# Patient Record
Sex: MALE | Race: WHITE | NOT HISPANIC OR LATINO
[De-identification: names, ages, dates, MRNs, and addresses within clinical notes are randomized per-mention and may not be internally consistent; named-entity substitution may affect disease eponyms.]

---

## 2017-06-25 PROBLEM — Z00.00 ENCOUNTER FOR PREVENTIVE HEALTH EXAMINATION: Status: ACTIVE | Noted: 2017-06-25

## 2017-07-27 ENCOUNTER — APPOINTMENT (OUTPATIENT)
Dept: UROLOGY | Facility: CLINIC | Age: 82
End: 2017-07-27
Payer: MEDICARE

## 2017-07-27 DIAGNOSIS — Z80.0 FAMILY HISTORY OF MALIGNANT NEOPLASM OF DIGESTIVE ORGANS: ICD-10-CM

## 2017-07-27 DIAGNOSIS — H40.9 UNSPECIFIED GLAUCOMA: ICD-10-CM

## 2017-07-27 DIAGNOSIS — Z80.42 FAMILY HISTORY OF MALIGNANT NEOPLASM OF PROSTATE: ICD-10-CM

## 2017-07-27 LAB
BILIRUB UR QL STRIP: NORMAL
CLARITY UR: CLEAR
COLLECTION METHOD: NORMAL
GLUCOSE UR-MCNC: NORMAL
HCG UR QL: NORMAL EU/DL
HGB UR QL STRIP.AUTO: NORMAL
KETONES UR-MCNC: NORMAL
LEUKOCYTE ESTERASE UR QL STRIP: 25
NITRITE UR QL STRIP: NORMAL
PH UR STRIP: 5
PROT UR STRIP-MCNC: NORMAL
SP GR UR STRIP: 1.01

## 2017-07-27 PROCEDURE — 81003 URINALYSIS AUTO W/O SCOPE: CPT | Mod: QW

## 2017-07-27 PROCEDURE — 99213 OFFICE O/P EST LOW 20 MIN: CPT

## 2017-07-27 RX ORDER — TIMOLOL MALEATE 5 MG/ML
0.5 SOLUTION OPHTHALMIC
Refills: 0 | Status: ACTIVE | COMMUNITY

## 2018-01-24 ENCOUNTER — APPOINTMENT (OUTPATIENT)
Dept: UROLOGY | Facility: CLINIC | Age: 83
End: 2018-01-24
Payer: MEDICARE

## 2018-01-24 VITALS
SYSTOLIC BLOOD PRESSURE: 159 MMHG | HEIGHT: 67 IN | DIASTOLIC BLOOD PRESSURE: 81 MMHG | HEART RATE: 64 BPM | WEIGHT: 180 LBS | BODY MASS INDEX: 28.25 KG/M2

## 2018-01-24 DIAGNOSIS — Z78.9 OTHER SPECIFIED HEALTH STATUS: ICD-10-CM

## 2018-01-24 LAB
BILIRUB UR QL STRIP: NORMAL
CLARITY UR: CLEAR
COLLECTION METHOD: NORMAL
GLUCOSE UR-MCNC: NORMAL
HCG UR QL: 2 EU/DL
HGB UR QL STRIP.AUTO: NORMAL
KETONES UR-MCNC: NORMAL
LEUKOCYTE ESTERASE UR QL STRIP: 25
NITRITE UR QL STRIP: NORMAL
PH UR STRIP: 6
PROT UR STRIP-MCNC: NORMAL
SP GR UR STRIP: 1.02

## 2018-01-24 PROCEDURE — 81003 URINALYSIS AUTO W/O SCOPE: CPT | Mod: QW

## 2018-01-24 PROCEDURE — 99213 OFFICE O/P EST LOW 20 MIN: CPT

## 2018-08-08 ENCOUNTER — APPOINTMENT (OUTPATIENT)
Dept: UROLOGY | Facility: CLINIC | Age: 83
End: 2018-08-08
Payer: MEDICARE

## 2018-08-08 VITALS
HEART RATE: 68 BPM | BODY MASS INDEX: 28.04 KG/M2 | SYSTOLIC BLOOD PRESSURE: 124 MMHG | HEIGHT: 68 IN | WEIGHT: 185 LBS | DIASTOLIC BLOOD PRESSURE: 84 MMHG

## 2018-08-08 DIAGNOSIS — N40.0 BENIGN PROSTATIC HYPERPLASIA WITHOUT LOWER URINARY TRACT SYMPMS: ICD-10-CM

## 2018-08-08 DIAGNOSIS — R97.20 ELEVATED PROSTATE, SPECIFIC ANTIGEN [PSA]: ICD-10-CM

## 2018-08-08 DIAGNOSIS — R97.20 BENIGN PROSTATIC HYPERPLASIA WITHOUT LOWER URINARY TRACT SYMPMS: ICD-10-CM

## 2018-08-08 LAB
BILIRUB UR QL STRIP: NORMAL
CLARITY UR: CLEAR
COLLECTION METHOD: NORMAL
GLUCOSE UR-MCNC: NORMAL
HCG UR QL: NORMAL EU/DL
HGB UR QL STRIP.AUTO: NORMAL
KETONES UR-MCNC: NORMAL
LEUKOCYTE ESTERASE UR QL STRIP: NORMAL
NITRITE UR QL STRIP: NORMAL
PH UR STRIP: 6
PROT UR STRIP-MCNC: NORMAL
SP GR UR STRIP: 1.01

## 2018-08-08 PROCEDURE — 99213 OFFICE O/P EST LOW 20 MIN: CPT

## 2018-08-08 PROCEDURE — 81003 URINALYSIS AUTO W/O SCOPE: CPT | Mod: QW

## 2018-12-19 ENCOUNTER — INPATIENT (INPATIENT)
Facility: HOSPITAL | Age: 83
LOS: 8 days | Discharge: HOME HEALTH | End: 2018-12-28
Attending: HOSPITALIST | Admitting: HOSPITALIST
Payer: MEDICARE

## 2018-12-19 VITALS
WEIGHT: 177.91 LBS | DIASTOLIC BLOOD PRESSURE: 87 MMHG | HEART RATE: 106 BPM | SYSTOLIC BLOOD PRESSURE: 129 MMHG | TEMPERATURE: 98 F | HEIGHT: 68 IN | OXYGEN SATURATION: 92 % | RESPIRATION RATE: 16 BRPM

## 2018-12-19 DIAGNOSIS — I42.8 OTHER CARDIOMYOPATHIES: ICD-10-CM

## 2018-12-19 DIAGNOSIS — I21.4 NON-ST ELEVATION (NSTEMI) MYOCARDIAL INFARCTION: ICD-10-CM

## 2018-12-19 DIAGNOSIS — Z87.891 PERSONAL HISTORY OF NICOTINE DEPENDENCE: ICD-10-CM

## 2018-12-19 DIAGNOSIS — E87.2 ACIDOSIS: ICD-10-CM

## 2018-12-19 DIAGNOSIS — I82.4Z9 ACUTE EMBOLISM AND THROMBOSIS OF UNSPECIFIED DEEP VEINS OF UNSPECIFIED DISTAL LOWER EXTREMITY: ICD-10-CM

## 2018-12-19 DIAGNOSIS — J81.0 ACUTE PULMONARY EDEMA: ICD-10-CM

## 2018-12-19 DIAGNOSIS — H40.9 UNSPECIFIED GLAUCOMA: ICD-10-CM

## 2018-12-19 DIAGNOSIS — N17.0 ACUTE KIDNEY FAILURE WITH TUBULAR NECROSIS: ICD-10-CM

## 2018-12-19 DIAGNOSIS — N18.3 CHRONIC KIDNEY DISEASE, STAGE 3 (MODERATE): ICD-10-CM

## 2018-12-19 DIAGNOSIS — Z90.49 ACQUIRED ABSENCE OF OTHER SPECIFIED PARTS OF DIGESTIVE TRACT: Chronic | ICD-10-CM

## 2018-12-19 DIAGNOSIS — I35.0 NONRHEUMATIC AORTIC (VALVE) STENOSIS: ICD-10-CM

## 2018-12-19 DIAGNOSIS — I27.20 PULMONARY HYPERTENSION, UNSPECIFIED: ICD-10-CM

## 2018-12-19 DIAGNOSIS — I50.33 ACUTE ON CHRONIC DIASTOLIC (CONGESTIVE) HEART FAILURE: ICD-10-CM

## 2018-12-19 DIAGNOSIS — I44.7 LEFT BUNDLE-BRANCH BLOCK, UNSPECIFIED: ICD-10-CM

## 2018-12-19 DIAGNOSIS — J96.01 ACUTE RESPIRATORY FAILURE WITH HYPOXIA: ICD-10-CM

## 2018-12-19 DIAGNOSIS — I48.0 PAROXYSMAL ATRIAL FIBRILLATION: ICD-10-CM

## 2018-12-19 LAB
ALBUMIN SERPL ELPH-MCNC: 4.2 G/DL — SIGNIFICANT CHANGE UP (ref 3.5–5.2)
ALP SERPL-CCNC: 110 U/L — SIGNIFICANT CHANGE UP (ref 30–115)
ALT FLD-CCNC: 57 U/L — HIGH (ref 0–41)
ANION GAP SERPL CALC-SCNC: 13 MMOL/L — SIGNIFICANT CHANGE UP (ref 7–14)
AST SERPL-CCNC: 59 U/L — HIGH (ref 0–41)
BILIRUB SERPL-MCNC: 1.7 MG/DL — HIGH (ref 0.2–1.2)
BUN SERPL-MCNC: 42 MG/DL — HIGH (ref 10–20)
CALCIUM SERPL-MCNC: 10.3 MG/DL — HIGH (ref 8.5–10.1)
CHLORIDE SERPL-SCNC: 99 MMOL/L — SIGNIFICANT CHANGE UP (ref 98–110)
CHOLEST SERPL-MCNC: 182 MG/DL — SIGNIFICANT CHANGE UP (ref 100–200)
CO2 SERPL-SCNC: 26 MMOL/L — SIGNIFICANT CHANGE UP (ref 17–32)
CREAT SERPL-MCNC: 1.2 MG/DL — SIGNIFICANT CHANGE UP (ref 0.7–1.5)
GLUCOSE BLDC GLUCOMTR-MCNC: 94 MG/DL — SIGNIFICANT CHANGE UP (ref 70–99)
GLUCOSE SERPL-MCNC: 120 MG/DL — HIGH (ref 70–99)
HCT VFR BLD CALC: 43.1 % — SIGNIFICANT CHANGE UP (ref 42–52)
HDLC SERPL-MCNC: 52 MG/DL — SIGNIFICANT CHANGE UP
HGB BLD-MCNC: 13.9 G/DL — LOW (ref 14–18)
LIPID PNL WITH DIRECT LDL SERPL: 119 MG/DL — SIGNIFICANT CHANGE UP (ref 4–129)
MCHC RBC-ENTMCNC: 28.1 PG — SIGNIFICANT CHANGE UP (ref 27–31)
MCHC RBC-ENTMCNC: 32.3 G/DL — SIGNIFICANT CHANGE UP (ref 32–37)
MCV RBC AUTO: 87.2 FL — SIGNIFICANT CHANGE UP (ref 80–94)
NRBC # BLD: 0 /100 WBCS — SIGNIFICANT CHANGE UP (ref 0–0)
NT-PROBNP SERPL-SCNC: 6468 PG/ML — HIGH (ref 0–300)
PLATELET # BLD AUTO: 239 K/UL — SIGNIFICANT CHANGE UP (ref 130–400)
POTASSIUM SERPL-MCNC: 5.1 MMOL/L — HIGH (ref 3.5–5)
POTASSIUM SERPL-SCNC: 5.1 MMOL/L — HIGH (ref 3.5–5)
PROT SERPL-MCNC: 7.8 G/DL — SIGNIFICANT CHANGE UP (ref 6–8)
RBC # BLD: 4.94 M/UL — SIGNIFICANT CHANGE UP (ref 4.7–6.1)
RBC # FLD: 13.3 % — SIGNIFICANT CHANGE UP (ref 11.5–14.5)
SODIUM SERPL-SCNC: 138 MMOL/L — SIGNIFICANT CHANGE UP (ref 135–146)
TOTAL CHOLESTEROL/HDL RATIO MEASUREMENT: 3.5 RATIO — LOW (ref 4–5.5)
TRIGL SERPL-MCNC: 96 MG/DL — SIGNIFICANT CHANGE UP (ref 10–149)
TROPONIN T SERPL-MCNC: 0.75 NG/ML — CRITICAL HIGH
WBC # BLD: 13.35 K/UL — HIGH (ref 4.8–10.8)
WBC # FLD AUTO: 13.35 K/UL — HIGH (ref 4.8–10.8)

## 2018-12-19 RX ORDER — METOPROLOL TARTRATE 50 MG
12.5 TABLET ORAL
Qty: 0 | Refills: 0 | Status: DISCONTINUED | OUTPATIENT
Start: 2018-12-19 | End: 2018-12-23

## 2018-12-19 RX ORDER — HEPARIN SODIUM 5000 [USP'U]/ML
4000 INJECTION INTRAVENOUS; SUBCUTANEOUS ONCE
Qty: 0 | Refills: 0 | Status: COMPLETED | OUTPATIENT
Start: 2018-12-19 | End: 2018-12-19

## 2018-12-19 RX ORDER — ASPIRIN/CALCIUM CARB/MAGNESIUM 324 MG
81 TABLET ORAL DAILY
Qty: 0 | Refills: 0 | Status: DISCONTINUED | OUTPATIENT
Start: 2018-12-20 | End: 2018-12-28

## 2018-12-19 RX ORDER — CLOPIDOGREL BISULFATE 75 MG/1
75 TABLET, FILM COATED ORAL DAILY
Qty: 0 | Refills: 0 | Status: DISCONTINUED | OUTPATIENT
Start: 2018-12-20 | End: 2018-12-28

## 2018-12-19 RX ORDER — FUROSEMIDE 40 MG
40 TABLET ORAL ONCE
Qty: 0 | Refills: 0 | Status: COMPLETED | OUTPATIENT
Start: 2018-12-19 | End: 2018-12-19

## 2018-12-19 RX ORDER — TIMOLOL 0.5 %
1 DROPS OPHTHALMIC (EYE)
Qty: 0 | Refills: 0 | Status: DISCONTINUED | OUTPATIENT
Start: 2018-12-19 | End: 2018-12-20

## 2018-12-19 RX ORDER — ATORVASTATIN CALCIUM 80 MG/1
40 TABLET, FILM COATED ORAL AT BEDTIME
Qty: 0 | Refills: 0 | Status: DISCONTINUED | OUTPATIENT
Start: 2018-12-19 | End: 2018-12-28

## 2018-12-19 RX ORDER — ASPIRIN/CALCIUM CARB/MAGNESIUM 324 MG
325 TABLET ORAL ONCE
Qty: 0 | Refills: 0 | Status: COMPLETED | OUTPATIENT
Start: 2018-12-19 | End: 2018-12-19

## 2018-12-19 RX ORDER — CLOPIDOGREL BISULFATE 75 MG/1
300 TABLET, FILM COATED ORAL ONCE
Qty: 0 | Refills: 0 | Status: COMPLETED | OUTPATIENT
Start: 2018-12-19 | End: 2018-12-19

## 2018-12-19 RX ORDER — TIMOLOL 0.5 %
1 DROPS OPHTHALMIC (EYE)
Qty: 0 | Refills: 0 | COMMUNITY

## 2018-12-19 RX ORDER — HEPARIN SODIUM 5000 [USP'U]/ML
INJECTION INTRAVENOUS; SUBCUTANEOUS
Qty: 25000 | Refills: 0 | Status: DISCONTINUED | OUTPATIENT
Start: 2018-12-19 | End: 2018-12-21

## 2018-12-19 RX ADMIN — HEPARIN SODIUM 4000 UNIT(S): 5000 INJECTION INTRAVENOUS; SUBCUTANEOUS at 20:57

## 2018-12-19 RX ADMIN — HEPARIN SODIUM 1000 UNIT(S)/HR: 5000 INJECTION INTRAVENOUS; SUBCUTANEOUS at 20:57

## 2018-12-19 RX ADMIN — Medication 40 MILLIGRAM(S): at 22:38

## 2018-12-19 RX ADMIN — Medication 325 MILLIGRAM(S): at 19:34

## 2018-12-19 RX ADMIN — CLOPIDOGREL BISULFATE 300 MILLIGRAM(S): 75 TABLET, FILM COATED ORAL at 22:38

## 2018-12-19 NOTE — ED PROVIDER NOTE - PHYSICAL EXAMINATION
VITAL SIGNS: I have reviewed nursing notes and confirm.  CONSTITUTIONAL: well-appearing, non-toxic, NAD  SKIN: Warm dry, normal skin turgor  HEAD: NCAT  EYES: EOMI, PERRLA, no scleral icterus  ENT:  normal pharynx with no erythema or exudates  NECK: Supple; non tender. Full ROM. No cervical LAD  CARD: RRR, no murmurs, rubs or gallops 2+ radiail   RESP: decreased b/l  ABD: soft, + BS, non-tender, non-distended, no rebound or guarding.  no pulsatile mass to abdomen   EXT: Full ROM, no bony tenderness, no pedal edema, no calf tenderness  NEURO: normal motor. normal sensory. CN II-XII intact. Cerebellar testing normal. Normal gait.  PSYCH: Cooperative, appropriate. VITAL SIGNS: I have reviewed nursing notes and confirm.  CONSTITUTIONAL: well-appearing, non-toxic, NAD  SKIN: Warm dry, normal skin turgor  HEAD: NCAT  EYES: EOMI, PERRLA, no scleral icterus  ENT:  normal pharynx with no erythema or exudates  NECK: Supple; non tender. Full ROM. No cervical LAD  CARD: RRR, +  Murmur, no rubs or gallops 2+ radiail   RESP: decreased b/l  ABD: soft, + BS, non-tender, non-distended, no rebound or guarding.  no pulsatile mass to abdomen   EXT: Full ROM, no bony tenderness, no pedal edema, no calf tenderness  NEURO: normal motor. normal sensory. CN II-XII intact. Cerebellar testing normal. Normal gait.  PSYCH: Cooperative, appropriate.

## 2018-12-19 NOTE — ED PROVIDER NOTE - NS ED ROS FT
Constitutional:  No fever, chills, lethargy, or abnormal weight loss  Eyes:  No eye pain or visual changes  ENMT: No nasal discharge, no toothache, no sore throat. No neck pain or stiffness  Cardiac:  hpi  Respiratory: hpi   GI:  No nausea, vomiting, diarrhea or abdominal pain.  :  No dysuria, frequency or burning.  MS:  No back or joint pain.  Neuro:  No headache. No numbness, weakness, or tingling.   Skin:  No skin rash  Except as documented in the HPI,  all other systems are negative

## 2018-12-19 NOTE — ED ADULT TRIAGE NOTE - CHIEF COMPLAINT QUOTE
As per patient's daughter: "Dr. Joy sent us here." Pt complain of shortness of breath, dry mouth and weakness."

## 2018-12-19 NOTE — ED PROVIDER NOTE - PROGRESS NOTE DETAILS
Dw cardiology fellow  given  new LBB - pt is asymptomatic -  no chest pain now  no chest pain at rest -  only with exertion -   pt is well appearing -   given  no rest pain  no active pain -  per cardiology fellow - nonemergent  cath -  no need for transfer  at this time- can keep in [facility] stemi code called after  repeat  EKG  showed  changes -  pt  however continues to be pain free.  well appearing d/w cards fellow  who  consulted with attending -  Dr conway -   No  cath at this  [facility]- medical management  -   will send  [facility]  tele - awaiting  troponin level. d/w Dr Ramey -   awar eof trop ,  chf, and murmur -   sent to [facility]  for cardiology  fellow  tammy  at [facility] Transfer uneventful. Patient awake, alert, answering questions, lungs CTABL, normal s1s2 Spoke to Dr. Tee, cardio fellow, give 300 mg of plavix, admit to the CCU. Dr. Tee, Dr. Craig at bedside. Would like patient NPO, possible cath tomorrow

## 2018-12-19 NOTE — CONSULT NOTE ADULT - ASSESSMENT
84 y/o M with PMHx of glaucoma, no cardiac hx, c/o sob on minimal exertion x2 days.     NSTEMI  new onset CHF    plan:  -CCU monitoring  -pt is currently asymptomatic   -ASA, plavix, statin, BB, heparin infusion  -IV lasix 40 mg x 1 (given in ED)  -monitor PTT  -check TTE/ lipids/ HBA1C  -NPO for possible cardiac cath in AM.

## 2018-12-19 NOTE — CONSULT NOTE ADULT - SUBJECTIVE AND OBJECTIVE BOX
Patient is a 83y old  Male who presents with a chief complaint of CODE STEMI (19 Dec 2018 22:17)    HPI:  84 y/o M with PMHx of glaucoma, no cardiac hx, c/o sob on minimal exertion x2 days. Yesterday he felt as if there was a sense of congestion in his upper airways, which he would only feel if he was exerting himself. Today he was going up and down some stairs and had the same feeling, but denies any chest pain/pressure/neck or jaw pain/nausea/diaphoresis. At baseline he has no exercise limitation, denies any family hx of heart dx, has had no cardiac interventions in the past. He went to Dr Joy's office, who did EKG showing new LBBB. He was sent to [facility] ER, EKG showed same finding, CODE STEMI was called to which cardiology team responded immediatley. Pt was asymptomatic in ED and code STEMI was canceled and pt was transferred to [facility] for further work up.     ROS:  All other systems reviewed and are negative    PAST MEDICAL & SURGICAL HISTORY  Glaucoma  History of appendectomy      FAMILY HISTORY:  FAMILY HISTORY:  No significant family history      SOCIAL HISTORY:  []smoker-former smoker   [-]Alcohol  [-]Drug    ALLERGIES:  No Known Allergies      MEDICATIONS:  MEDICATIONS  (STANDING):  heparin  Infusion.  Unit(s)/Hr (10 mL/Hr) IV Continuous <Continuous>  timolol 0.5% Solution 1 Drop(s) Both EYES two times a day    MEDICATIONS  (PRN):      HOME MEDICATIONS:  Home Medications:  timolol hemihydrate 0.5% ophthalmic solution: 1 drop(s) to each affected eye 2 times a day (19 Dec 2018 19:04)      VITALS:   T(F): 98 (12-19 @ 22:30), Max: 99.3 (12-19 @ 21:45)  HR: 102 (12-19 @ 22:30) (102 - 110)  BP: 138/72 (12-19 @ 22:30) (118/71 - 158/80)  BP(mean): --  RR: 20 (12-19 @ 22:30) (16 - 20)  SpO2: 99% (12-19 @ 22:30) (92% - 100%)    I&O's Summary      PHYSICAL EXAM:  GEN: Alert and oriented X 3, No acute distress  HEENT: no pallor  NECK: Supple, no JVD  LUNGS: bibasilar crackles   CARDIOVASCULAR: S1/S2 regular,   ABD: Soft, BS+  EXT: No Lower extremity edema/cyanosis  NEURO: Non focal  SKIN: Intact    LABS:                        13.9   13.35 )-----------( 239      ( 19 Dec 2018 19:42 )             43.1     12-19    138  |  99  |  42<H>  ----------------------------<  120<H>  5.1<H>   |  26  |  1.2    Ca    10.3<H>      19 Dec 2018 19:42    TPro  7.8  /  Alb  4.2  /  TBili  1.7<H>  /  DBili  x   /  AST  59<H>  /  ALT  57<H>  /  AlkPhos  110  12-19      Troponin T, Serum: 0.75 ng/mL <HH> (12-19-18 @ 19:42)    CARDIAC MARKERS ( 19 Dec 2018 19:42 )  x     / 0.75 ng/mL / x     / x     / x            Troponin trend:    Serum Pro-Brain Natriuretic Peptide: 6468 pg/mL (12-19-18 @ 19:42)    12-19 Chol 182  HDL 52 Trig 96  Hemoglobin A1C   Thyroid      RADIOLOGY:    ECG:  Sinus tachycardia, LBBB

## 2018-12-19 NOTE — H&P ADULT - HISTORY OF PRESENT ILLNESS
84 y/o M with PMHx glaucoma, no cardiac hx, c/o sob on minimal exertion x1 day. He went to Dr Joy office, who did EKG showing new LBBB. He was sent to [facility] ER, EKG showed same finding, CODE STEMI was called and he was transferred to [place]. Pt will likely go for cardiac cath josie. 82 y/o M with PMHx glaucoma, no cardiac hx, c/o sob on minimal exertion x2 days. Yesterday he fel as if there was a sense of congestion in his upper airways, which he would only feel if he was exerting himself. Today he was going up and down some stairs and had the same feeling, but denies any chest pain/pressure/neck or jaw pain/nausea/diaphoresis. At baseline he has no exercise limitation, denies any family hx of heart dx, has had no cardiac interventions in the past. He went to Dr Joy's office, who did EKG showing new LBBB. [facility] [place] [facility] [place] [facility], EKG showed same finding, CODE STEMI was called and he was transferred to [place]. Pt will likely go for cardiac cath josie.

## 2018-12-19 NOTE — H&P ADULT - ASSESSMENT
1. CODE STEMI-new LBBB, signs of fluid overload (on 3L nc sating 94%, +congestion on cxr, +crackles)  -loaded with asa, plavix, heparin gtt  -will go for cath tomorrow with Dr Craig  -npo after midnight   -monitor ptt -acs protocol  -trend cardiac enzymes, pt asymptomatic at this time  -will give one time dose of lasix  -check echo, +systolic murmur heard on physical exam    2. Glaucoma: c/w eye drops  3. DVT PPx: on therapeutic heparin gtt

## 2018-12-19 NOTE — ED ADULT NURSE NOTE - NSIMPLEMENTINTERV_GEN_ALL_ED
Implemented All Universal Safety Interventions:  [place] to call system. Call bell, personal items and telephone within reach. Instruct patient to call for assistance. Room bathroom lighting operational. Non-slip footwear when patient is off stretcher. Physically safe environment: no spills, clutter or unnecessary equipment. Stretcher in lowest position, wheels locked, appropriate side rails in place.

## 2018-12-19 NOTE — ED ADULT NURSE REASSESSMENT NOTE - NS ED NURSE REASSESS COMMENT FT1
Rec'd pt as transfer from [facility] with diagnosis of STEMI; pt has no physical complaints at this time; on heparin drip at 1000 units/hr

## 2018-12-19 NOTE — H&P ADULT - NSHPPHYSICALEXAM_GEN_ALL_CORE
General: well appearing gentleman reclining on his back in NAD  Cardiac: +systolic murmur appreciated in the R sternal border  Lungs: +crackles in bilateral lung bases, on 3L NC sating 94%  Abd: NTND, +BS  LE: no swelling  Neuro: AAOx3, nonfocal

## 2018-12-19 NOTE — ED PROVIDER NOTE - OBJECTIVE STATEMENT
83yM pmhx  glaucoma only  no pmhx  follows regularly with PMD Dr Joy  - p/w  CHARLES and CO on exertion  started today while walking up flight of stairs -   went to Dr Joy  who performed EKG - had New LBBB - did not have LBBB 1 month ago -  Pt  previous smoker -  stopped  30 years ago .  never seen by cardiology  never had stress test   .   pain is  described  as "dryness" to  chest with exertion   + sob. no diaphoresis no leg pain  no paresthesias numbness of extremities  no abdominal pain or back pain  .

## 2018-12-19 NOTE — H&P ADULT - ATTENDING COMMENTS
Patient was seen independently. Latest vital signs and labs were reviewed. Case was discussed with resident in morning rounds.  Agree with resident's assessment & plan which was reviewed by me and modified where necessary.   ASSESSMENT:  Non ST elevation Myocardial Infarction  New LBB, possibility of ischemia needs to be ruled out  New onset CHF, type yet unspecified    -Tele monitoring   - plan for cardiac cath  - keep NPO  - trend trops  - get TTE  - continue with lasix IV Patient was seen independently. Latest vital signs and labs were reviewed. Case was discussed with resident in morning rounds.  Agree with resident's assessment & plan which was reviewed by me and modified where necessary.   ASSESSMENT:  Non ST elevation Myocardial Infarction  New LBB, possibility of ischemia needs to be ruled out  New onset CHF, type yet unspecified    -Tele monitoring   - plan for cardiac cath  - keep NPO  - trend trops  - get TTE  - continue with lasix IV  - C/W aspirin , plavic BB, statin  - heparin infusion

## 2018-12-19 NOTE — ED PROVIDER NOTE - CARE PLAN
Principal Discharge DX:	STEMI (ST elevation myocardial infarction)  Secondary Diagnosis:	Chest pain  Secondary Diagnosis:	CHARLES (dyspnea on exertion)

## 2018-12-19 NOTE — ED PROVIDER NOTE - MEDICAL DECISION MAKING DETAILS
I personally evaluated the patient. I reviewed the Resident’s or Physician Assistant’s note (as assigned above), and agree with the findings and plan except as documented in my note. Patient transferred [place] by Dr. Hickman, [facility]. Patient transferr[facility] [place], jorge a nd evaluated by Dr. colvin of cardiology and dR. Bermeo cardiac fellow bedside, patient placed in CCU. NO cp/sob, no n/v/d, no loc in the ED at [place]

## 2018-12-20 LAB
ANION GAP SERPL CALC-SCNC: 18 MMOL/L — HIGH (ref 7–14)
ANION GAP SERPL CALC-SCNC: 19 MMOL/L — HIGH (ref 7–14)
APTT BLD: 50.5 SEC — HIGH (ref 27–39.2)
APTT BLD: 56.8 SEC — HIGH (ref 27–39.2)
APTT BLD: 61.7 SEC — HIGH (ref 27–39.2)
APTT BLD: 64 SEC — HIGH (ref 27–39.2)
BUN SERPL-MCNC: 40 MG/DL — HIGH (ref 10–20)
BUN SERPL-MCNC: 41 MG/DL — HIGH (ref 10–20)
CALCIUM SERPL-MCNC: 8.9 MG/DL — SIGNIFICANT CHANGE UP (ref 8.5–10.1)
CALCIUM SERPL-MCNC: 9.5 MG/DL — SIGNIFICANT CHANGE UP (ref 8.5–10.1)
CHLORIDE SERPL-SCNC: 98 MMOL/L — SIGNIFICANT CHANGE UP (ref 98–110)
CHLORIDE SERPL-SCNC: 99 MMOL/L — SIGNIFICANT CHANGE UP (ref 98–110)
CK MB CFR SERPL CALC: 2.2 NG/ML — SIGNIFICANT CHANGE UP (ref 0.6–6.3)
CK MB CFR SERPL CALC: 3.4 NG/ML — SIGNIFICANT CHANGE UP (ref 0.6–6.3)
CK MB CFR SERPL CALC: 4.4 NG/ML — SIGNIFICANT CHANGE UP (ref 0.6–6.3)
CK SERPL-CCNC: 61 U/L — SIGNIFICANT CHANGE UP (ref 0–225)
CK SERPL-CCNC: 78 U/L — SIGNIFICANT CHANGE UP (ref 0–225)
CK SERPL-CCNC: 89 U/L — SIGNIFICANT CHANGE UP (ref 0–225)
CO2 SERPL-SCNC: 21 MMOL/L — SIGNIFICANT CHANGE UP (ref 17–32)
CO2 SERPL-SCNC: 23 MMOL/L — SIGNIFICANT CHANGE UP (ref 17–32)
CREAT SERPL-MCNC: 1.1 MG/DL — SIGNIFICANT CHANGE UP (ref 0.7–1.5)
CREAT SERPL-MCNC: 1.1 MG/DL — SIGNIFICANT CHANGE UP (ref 0.7–1.5)
GLUCOSE SERPL-MCNC: 109 MG/DL — HIGH (ref 70–99)
GLUCOSE SERPL-MCNC: 114 MG/DL — HIGH (ref 70–99)
HCT VFR BLD CALC: 38.1 % — LOW (ref 42–52)
HGB BLD-MCNC: 12.8 G/DL — LOW (ref 14–18)
INR BLD: 1.69 RATIO — HIGH (ref 0.65–1.3)
MCHC RBC-ENTMCNC: 28.8 PG — SIGNIFICANT CHANGE UP (ref 27–31)
MCHC RBC-ENTMCNC: 33.6 G/DL — SIGNIFICANT CHANGE UP (ref 32–37)
MCV RBC AUTO: 85.6 FL — SIGNIFICANT CHANGE UP (ref 80–94)
NRBC # BLD: 0 /100 WBCS — SIGNIFICANT CHANGE UP (ref 0–0)
PLATELET # BLD AUTO: 204 K/UL — SIGNIFICANT CHANGE UP (ref 130–400)
POTASSIUM SERPL-MCNC: 4.4 MMOL/L — SIGNIFICANT CHANGE UP (ref 3.5–5)
POTASSIUM SERPL-MCNC: 4.6 MMOL/L — SIGNIFICANT CHANGE UP (ref 3.5–5)
POTASSIUM SERPL-SCNC: 4.4 MMOL/L — SIGNIFICANT CHANGE UP (ref 3.5–5)
POTASSIUM SERPL-SCNC: 4.6 MMOL/L — SIGNIFICANT CHANGE UP (ref 3.5–5)
PROTHROM AB SERPL-ACNC: 19.3 SEC — HIGH (ref 9.95–12.87)
RBC # BLD: 4.45 M/UL — LOW (ref 4.7–6.1)
RBC # FLD: 13.4 % — SIGNIFICANT CHANGE UP (ref 11.5–14.5)
SODIUM SERPL-SCNC: 139 MMOL/L — SIGNIFICANT CHANGE UP (ref 135–146)
SODIUM SERPL-SCNC: 139 MMOL/L — SIGNIFICANT CHANGE UP (ref 135–146)
TROPONIN T SERPL-MCNC: 0.65 NG/ML — CRITICAL HIGH
TROPONIN T SERPL-MCNC: 0.75 NG/ML — CRITICAL HIGH
TROPONIN T SERPL-MCNC: 0.76 NG/ML — CRITICAL HIGH
WBC # BLD: 13.87 K/UL — HIGH (ref 4.8–10.8)
WBC # FLD AUTO: 13.87 K/UL — HIGH (ref 4.8–10.8)

## 2018-12-20 RX ORDER — SODIUM CHLORIDE 9 MG/ML
1000 INJECTION INTRAMUSCULAR; INTRAVENOUS; SUBCUTANEOUS ONCE
Qty: 0 | Refills: 0 | Status: DISCONTINUED | OUTPATIENT
Start: 2018-12-20 | End: 2018-12-20

## 2018-12-20 RX ORDER — AMIODARONE HYDROCHLORIDE 400 MG/1
0.5 TABLET ORAL
Qty: 900 | Refills: 0 | Status: DISCONTINUED | OUTPATIENT
Start: 2018-12-21 | End: 2018-12-21

## 2018-12-20 RX ORDER — TIMOLOL 0.5 %
1 DROPS OPHTHALMIC (EYE) ONCE
Qty: 0 | Refills: 0 | Status: COMPLETED | OUTPATIENT
Start: 2018-12-20 | End: 2018-12-21

## 2018-12-20 RX ORDER — CHLORHEXIDINE GLUCONATE 213 G/1000ML
1 SOLUTION TOPICAL
Qty: 0 | Refills: 0 | Status: DISCONTINUED | OUTPATIENT
Start: 2018-12-20 | End: 2018-12-28

## 2018-12-20 RX ORDER — AMIODARONE HYDROCHLORIDE 400 MG/1
150 TABLET ORAL ONCE
Qty: 0 | Refills: 0 | Status: COMPLETED | OUTPATIENT
Start: 2018-12-20 | End: 2018-12-20

## 2018-12-20 RX ORDER — AMIODARONE HYDROCHLORIDE 400 MG/1
1 TABLET ORAL
Qty: 900 | Refills: 0 | Status: DISCONTINUED | OUTPATIENT
Start: 2018-12-20 | End: 2018-12-21

## 2018-12-20 RX ADMIN — Medication 12.5 MILLIGRAM(S): at 00:45

## 2018-12-20 RX ADMIN — Medication 1 DROP(S): at 06:09

## 2018-12-20 RX ADMIN — HEPARIN SODIUM 1150 UNIT(S)/HR: 5000 INJECTION INTRAVENOUS; SUBCUTANEOUS at 04:53

## 2018-12-20 RX ADMIN — AMIODARONE HYDROCHLORIDE 618 MILLIGRAM(S): 400 TABLET ORAL at 21:20

## 2018-12-20 RX ADMIN — Medication 12.5 MILLIGRAM(S): at 17:29

## 2018-12-20 RX ADMIN — ATORVASTATIN CALCIUM 40 MILLIGRAM(S): 80 TABLET, FILM COATED ORAL at 21:48

## 2018-12-20 RX ADMIN — CLOPIDOGREL BISULFATE 75 MILLIGRAM(S): 75 TABLET, FILM COATED ORAL at 11:18

## 2018-12-20 RX ADMIN — Medication 81 MILLIGRAM(S): at 11:48

## 2018-12-20 RX ADMIN — AMIODARONE HYDROCHLORIDE 33.33 MG/MIN: 400 TABLET ORAL at 21:30

## 2018-12-20 RX ADMIN — Medication 12.5 MILLIGRAM(S): at 07:09

## 2018-12-20 NOTE — PROGRESS NOTE ADULT - SUBJECTIVE AND OBJECTIVE BOX
SUBJECTIVE:    Patient is a 83y old Male who presents with a chief complaint of CODE STEMI (19 Dec 2018 23:26)    Currently admitted to medicine with the primary diagnosis of STEMI (ST elevation myocardial infarction)     Today is hospital day 1d. This morning he is resting comfortably in bed and reports no new issues or overnight events.   CCU/ICU day  Intubated:  Central Line:  Linares:  NG:  Drips:  Iv Lines:    PAST MEDICAL & SURGICAL HISTORY  Glaucoma  History of appendectomy    SOCIAL HISTORY:  Negative for smoking/alcohol/drug use.     ALLERGIES:  No Known Allergies    MEDICATIONS:  STANDING MEDICATIONS  aspirin  chewable 81 milliGRAM(s) Oral daily  atorvastatin 40 milliGRAM(s) Oral at bedtime  chlorhexidine 4% Liquid 1 Application(s) Topical <User Schedule>  clopidogrel Tablet 75 milliGRAM(s) Oral daily  heparin  Infusion.  Unit(s)/Hr IV Continuous <Continuous>  metoprolol tartrate 12.5 milliGRAM(s) Oral two times a day  timolol 0.5% Solution 1 Drop(s) Both EYES once    PRN MEDICATIONS    Home Medications:  timolol hemihydrate 0.5% ophthalmic solution: 1 drop(s) to each affected eye 2 times a day (19 Dec 2018 19:04)    VITALS:   T(F): 98.9  HR: 92  BP: 110/62  RR: 22  SpO2: 96%    LABS:                        12.8   13.87 )-----------( 204      ( 20 Dec 2018 02:30 )             38.1     12-20    139  |  99  |  40<H>  ----------------------------<  109<H>  4.6   |  21  |  1.1    Ca    8.9      20 Dec 2018 04:25    TPro  7.8  /  Alb  4.2  /  TBili  1.7<H>  /  DBili  x   /  AST  59<H>  /  ALT  57<H>  /  AlkPhos  110  12-19    PTT - ( 20 Dec 2018 04:25 )  PTT:56.8 sec      Creatine Kinase, Serum: 78 U/L (12-20-18 @ 04:25)  Troponin T, Serum: 0.65 ng/mL <HH> (12-20-18 @ 04:25)  Creatine Kinase, Serum: 89 U/L (12-20-18 @ 01:23)  Troponin T, Serum: 0.75 ng/mL <HH> (12-20-18 @ 01:23)  Troponin T, Serum: 0.75 ng/mL <HH> (12-19-18 @ 19:42)      CARDIAC MARKERS ( 20 Dec 2018 04:25 )  x     / 0.65 ng/mL / 78 U/L / x     / 3.4 ng/mL  CARDIAC MARKERS ( 20 Dec 2018 01:23 )  x     / 0.75 ng/mL / 89 U/L / x     / 4.4 ng/mL  CARDIAC MARKERS ( 19 Dec 2018 19:42 )  x     / 0.75 ng/mL / x     / x     / x          RADIOLOGY:    PHYSICAL EXAM:  GEN: No acute distress  LUNGS: Clear to auscultation bilaterally   HEART: S1/S2 present. RRR.   ABD: Soft, non-tender, non-distended. Bowel sounds present  EXT: NC/NC/NE/2+PP/THOMAS/Skin Intact.   NEURO: AAOX3 Patient is a 83y old Male who presented with a chief complaint of CODE STEMI (19 Dec 2018 23:26)  Currently admitted to medicine with the primary diagnosis of STEMI (ST elevation myocardial infarction)     Today is hospital day 1d. This morning he is resting comfortably in bed and reports no new issues or overnight events.   CCU/ICU day 01  Intubated: No  Central Line: No  Linares: No  NG: No  Drips: Heparin infusion  Iv Lines: 1 Peripheral    PAST MEDICAL & SURGICAL HISTORY  Glaucoma  History of appendectomy    SOCIAL HISTORY:  Negative for smoking/alcohol/drug use.     ALLERGIES:  No Known Allergies    MEDICATIONS:  STANDING MEDICATIONS  aspirin  chewable 81 milliGRAM(s) Oral daily  atorvastatin 40 milliGRAM(s) Oral at bedtime  chlorhexidine 4% Liquid 1 Application(s) Topical <User Schedule>  clopidogrel Tablet 75 milliGRAM(s) Oral daily  heparin  Infusion.  Unit(s)/Hr IV Continuous <Continuous>  metoprolol tartrate 12.5 milliGRAM(s) Oral two times a day  timolol 0.5% Solution 1 Drop(s) Both EYES once    PRN MEDICATIONS    Home Medications:  timolol hemihydrate 0.5% ophthalmic solution: 1 drop(s) to each affected eye 2 times a day (19 Dec 2018 19:04)    VITALS:   T(F): 98.9  HR: 92  BP: 110/62  RR: 22  SpO2: 96%    LABS:                        12.8   13.87 )-----------( 204      ( 20 Dec 2018 02:30 )             38.1     12-20    139  |  99  |  40<H>  ----------------------------<  109<H>  4.6   |  21  |  1.1    Ca    8.9      20 Dec 2018 04:25    TPro  7.8  /  Alb  4.2  /  TBili  1.7<H>  /  DBili  x   /  AST  59<H>  /  ALT  57<H>  /  AlkPhos  110  12-19    PTT - ( 20 Dec 2018 04:25 )  PTT:56.8 sec      Creatine Kinase, Serum: 78 U/L (12-20-18 @ 04:25)  Troponin T, Serum: 0.65 ng/mL <HH> (12-20-18 @ 04:25)  Creatine Kinase, Serum: 89 U/L (12-20-18 @ 01:23)  Troponin T, Serum: 0.75 ng/mL <HH> (12-20-18 @ 01:23)  Troponin T, Serum: 0.75 ng/mL <HH> (12-19-18 @ 19:42)      CARDIAC MARKERS ( 20 Dec 2018 04:25 )  x     / 0.65 ng/mL / 78 U/L / x     / 3.4 ng/mL  CARDIAC MARKERS ( 20 Dec 2018 01:23 )  x     / 0.75 ng/mL / 89 U/L / x     / 4.4 ng/mL  CARDIAC MARKERS ( 19 Dec 2018 19:42 )  x     / 0.75 ng/mL / x     / x     / x          RADIOLOGY:  < from: Xray Chest 1 View-PORTABLE IMMEDIATE (12.19.18 @ 20:01) >  Impression:    Mild vascular congestion.No parenchymal opacity pleural effusion or air   leak      < from: Xray Chest 1 View- PORTABLE-Routine (12.20.18 @ 05:16) >  Impression:    Improving opacifications.    PHYSICAL EXAM:  GEN: No acute distress  LUNGS: Clear to auscultation bilaterally   HEART: S1/S2 present. RRR.   ABD: Soft, non-tender, non-distended. Bowel sounds present  NEURO: AAOX3

## 2018-12-20 NOTE — PROGRESS NOTE ADULT - ASSESSMENT
84 y/o M with PMHx glaucoma, no cardiac hx, c/o sob on minimal exertion x2 days.    #CODE STEMI-new LBBB, signs of fluid overload (on 3L nc sating 94%, +congestion on cxr, +crackles)  -loaded with asa, plavix, heparin gtt  -will go for cath tomorrow with Dr Craig  -npo after midnight   -monitor ptt -acs protocol  -trend cardiac enzymes, pt asymptomatic at this time  -will give one time dose of lasix  -check echo, +systolic murmur heard on physical exam    #Glaucoma: c/w eye drops      #DVT PPx: on therapeutic heparin gtt  GI PPx: 84 y/o M with PMHx glaucoma, no cardiac hx, c/o sob on minimal exertion x2 days. EKG showed new onset LBBB.    #CODE STEMI-new LBBB, signs of fluid overload (on 3L nc sating 94%, +congestion on cxr, +crackles)  -loaded with asa, plavix, heparin gtt  -will go for cath tomorrow with Dr Craig 12/21  -npo after midnight   -monitor ptt -acs protocol  -CE x 3 0.75 > 0.75 > 0.65  -got one time dose of lasix  -Echo ordered    #Glaucoma:   - c/w eye drops      DVT PPx: on therapeutic heparin gtt  GI PPx: Not indicated  Activity as tolerated  Diet: regular  Dispo: Home

## 2018-12-20 NOTE — CHART NOTE - NSCHARTNOTEFT_GEN_A_CORE
Pt with rate dependent LBBB, had several runs of sustained afib RVR in 150s-160s. Pt asymptomatic, normotensive during episodes. Stat cardiac enzymes and EKG ordered. 12 lead EKG showed afib RVR with LBBB. Ordered amio bolus and drip. Pt with rate dependent LBBB, had several runs of sustained afib RVR in 150s-160s. Pt asymptomatic, normotensive during episodes. Stat cardiac enzymes and EKG ordered. 12 lead EKG showed afib RVR with LBBB. Cardiac fellow at bedside. Ordered amio bolus and drip.

## 2018-12-21 LAB
APTT BLD: 35 SEC — SIGNIFICANT CHANGE UP (ref 27–39.2)
APTT BLD: 54 SEC — HIGH (ref 27–39.2)
GAS PNL BLDA: SIGNIFICANT CHANGE UP
HCT VFR BLD CALC: 37.9 % — LOW (ref 42–52)
HGB BLD-MCNC: 12.4 G/DL — LOW (ref 14–18)
INR BLD: 1.7 RATIO — HIGH (ref 0.65–1.3)
INR BLD: 1.76 RATIO — HIGH (ref 0.65–1.3)
MCHC RBC-ENTMCNC: 28.1 PG — SIGNIFICANT CHANGE UP (ref 27–31)
MCHC RBC-ENTMCNC: 32.7 G/DL — SIGNIFICANT CHANGE UP (ref 32–37)
MCV RBC AUTO: 85.9 FL — SIGNIFICANT CHANGE UP (ref 80–94)
NRBC # BLD: 0 /100 WBCS — SIGNIFICANT CHANGE UP (ref 0–0)
PLATELET # BLD AUTO: 223 K/UL — SIGNIFICANT CHANGE UP (ref 130–400)
PROTHROM AB SERPL-ACNC: 19.5 SEC — HIGH (ref 9.95–12.87)
PROTHROM AB SERPL-ACNC: 20.1 SEC — HIGH (ref 9.95–12.87)
RBC # BLD: 4.41 M/UL — LOW (ref 4.7–6.1)
RBC # FLD: 13.7 % — SIGNIFICANT CHANGE UP (ref 11.5–14.5)
TROPONIN T SERPL-MCNC: 0.66 NG/ML — CRITICAL HIGH
WBC # BLD: 12.63 K/UL — HIGH (ref 4.8–10.8)
WBC # FLD AUTO: 12.63 K/UL — HIGH (ref 4.8–10.8)

## 2018-12-21 PROCEDURE — 93970 EXTREMITY STUDY: CPT | Mod: 26

## 2018-12-21 RX ORDER — FUROSEMIDE 40 MG
40 TABLET ORAL ONCE
Qty: 0 | Refills: 0 | Status: COMPLETED | OUTPATIENT
Start: 2018-12-21 | End: 2018-12-21

## 2018-12-21 RX ORDER — HEPARIN SODIUM 5000 [USP'U]/ML
3000 INJECTION INTRAVENOUS; SUBCUTANEOUS EVERY 6 HOURS
Qty: 0 | Refills: 0 | Status: DISCONTINUED | OUTPATIENT
Start: 2018-12-21 | End: 2018-12-22

## 2018-12-21 RX ORDER — HEPARIN SODIUM 5000 [USP'U]/ML
1500 INJECTION INTRAVENOUS; SUBCUTANEOUS
Qty: 25000 | Refills: 0 | Status: DISCONTINUED | OUTPATIENT
Start: 2018-12-21 | End: 2018-12-22

## 2018-12-21 RX ORDER — AMIODARONE HYDROCHLORIDE 400 MG/1
200 TABLET ORAL
Qty: 0 | Refills: 0 | Status: DISCONTINUED | OUTPATIENT
Start: 2018-12-22 | End: 2018-12-22

## 2018-12-21 RX ORDER — ALPRAZOLAM 0.25 MG
0.25 TABLET ORAL ONCE
Qty: 0 | Refills: 0 | Status: DISCONTINUED | OUTPATIENT
Start: 2018-12-21 | End: 2018-12-21

## 2018-12-21 RX ORDER — SODIUM CHLORIDE 9 MG/ML
1000 INJECTION INTRAMUSCULAR; INTRAVENOUS; SUBCUTANEOUS
Qty: 0 | Refills: 0 | Status: DISCONTINUED | OUTPATIENT
Start: 2018-12-21 | End: 2018-12-21

## 2018-12-21 RX ORDER — HEPARIN SODIUM 5000 [USP'U]/ML
6500 INJECTION INTRAVENOUS; SUBCUTANEOUS EVERY 6 HOURS
Qty: 0 | Refills: 0 | Status: DISCONTINUED | OUTPATIENT
Start: 2018-12-21 | End: 2018-12-22

## 2018-12-21 RX ORDER — HEPARIN SODIUM 5000 [USP'U]/ML
INJECTION INTRAVENOUS; SUBCUTANEOUS
Qty: 25000 | Refills: 0 | Status: DISCONTINUED | OUTPATIENT
Start: 2018-12-21 | End: 2018-12-21

## 2018-12-21 RX ORDER — AMIODARONE HYDROCHLORIDE 400 MG/1
200 TABLET ORAL ONCE
Qty: 0 | Refills: 0 | Status: COMPLETED | OUTPATIENT
Start: 2018-12-21 | End: 2018-12-21

## 2018-12-21 RX ADMIN — Medication 40 MILLIGRAM(S): at 15:38

## 2018-12-21 RX ADMIN — Medication 1 DROP(S): at 08:13

## 2018-12-21 RX ADMIN — HEPARIN SODIUM 15 UNIT(S)/HR: 5000 INJECTION INTRAVENOUS; SUBCUTANEOUS at 21:00

## 2018-12-21 RX ADMIN — Medication 12.5 MILLIGRAM(S): at 17:12

## 2018-12-21 RX ADMIN — Medication 81 MILLIGRAM(S): at 10:38

## 2018-12-21 RX ADMIN — CHLORHEXIDINE GLUCONATE 1 APPLICATION(S): 213 SOLUTION TOPICAL at 06:27

## 2018-12-21 RX ADMIN — Medication 0.25 MILLIGRAM(S): at 14:13

## 2018-12-21 RX ADMIN — AMIODARONE HYDROCHLORIDE 200 MILLIGRAM(S): 400 TABLET ORAL at 19:54

## 2018-12-21 RX ADMIN — CLOPIDOGREL BISULFATE 75 MILLIGRAM(S): 75 TABLET, FILM COATED ORAL at 10:39

## 2018-12-21 RX ADMIN — HEPARIN SODIUM 1500 UNIT(S)/HR: 5000 INJECTION INTRAVENOUS; SUBCUTANEOUS at 17:24

## 2018-12-21 RX ADMIN — Medication 12.5 MILLIGRAM(S): at 06:26

## 2018-12-21 RX ADMIN — ATORVASTATIN CALCIUM 40 MILLIGRAM(S): 80 TABLET, FILM COATED ORAL at 21:13

## 2018-12-21 NOTE — CHART NOTE - NSCHARTNOTEFT_GEN_A_CORE
Pt's Duplex came back positive for Left post tibial DVT. Pt is still on high flow 50% and saturating in 90s. Ekg showed sinus tachycardia. I started the pt on Heparin. As pt got his Cardiac catheterization today, would ask Pulmo fellow about getting CTA. His RFTs as of now are wnl. Called by vascular prelim Duplex positive for Left post tibial DVT. Pt is still on high flow 80% and saturating in 94s. Ekg showed sinus tachycardia. I started the pt on Heparin.     Blood Gas Profile - Arterial (12.21.18 @ 15:10)    pH, Arterial: 7.40    pCO2, Arterial: 35 mmHg    pO2, Arterial: 47 mmHg    HCO3, Arterial: 21 mmoL/L    Base Excess, Arterial: -3.0 mmoL/L    Oxygen Saturation, Arterial: 81 %    FIO2, Arterial: 100    #)Acute hypoxic resp failure Likely PE (Sinus tach, hypoxic, high A-a gradient new DVT)  -Hemodynamically stable, no need for CTA, spoke with pulm fellow on call c/w   -not able to tolerate face mask started on high flow  -c/w high flow, heparin drip  -Follow up PTT  -Informed cardiac fellow about the status. Called by vascular prelim Duplex positive for Left post tibial DVT. Pt is still on high flow 80% and saturating in 94s. Ekg showed sinus tachycardia. I started the pt on Heparin.     ICU Vital Signs Last 24 Hrs  T(C): 36.4 (21 Dec 2018 16:00), Max: 36.8 (21 Dec 2018 04:00)  T(F): 97.6 (21 Dec 2018 16:00), Max: 98.3 (21 Dec 2018 04:00)  HR: 90 (21 Dec 2018 17:00) (80 - 152)  BP: 132/74 (21 Dec 2018 17:00) (90/54 - 168/90)  BP(mean): 94 (21 Dec 2018 17:00) (59 - 124)  ABP: --  ABP(mean): --  RR: 28 (21 Dec 2018 17:00) (22 - 35)  SpO2: 94% (21 Dec 2018 17:00) (75% - 97%)    Blood Gas Profile - Arterial (12.21.18 @ 15:10)    pH, Arterial: 7.40    pCO2, Arterial: 35 mmHg    pO2, Arterial: 47 mmHg    HCO3, Arterial: 21 mmoL/L    Base Excess, Arterial: -3.0 mmoL/L    Oxygen Saturation, Arterial: 81 %    FIO2, Arterial: 100      #)Acute hypoxic resp failure Likely PE (Sinus tach, hypoxic, high A-a gradient new DVT)  -Hemodynamically stable, no need for CTA, spoke with pulm fellow on call c/w high flow.  -not able to tolerate face mask started on high flow  -c/w high flow, heparin drip  -Follow up PTT  -Informed cardiac fellow about the status. Called by vascular prelim Duplex positive for Left post tibial DVT. Pt is still on high flow 80% and saturating in 94s. Ekg showed sinus tachycardia. I started the pt on Heparin.     ICU Vital Signs Last 24 Hrs  T(C): 36.4 (21 Dec 2018 16:00), Max: 36.8 (21 Dec 2018 04:00)  T(F): 97.6 (21 Dec 2018 16:00), Max: 98.3 (21 Dec 2018 04:00)  HR: 90 (21 Dec 2018 17:00) (80 - 152)  BP: 132/74 (21 Dec 2018 17:00) (90/54 - 168/90)  BP(mean): 94 (21 Dec 2018 17:00) (59 - 124)  ABP: --  ABP(mean): --  RR: 28 (21 Dec 2018 17:00) (22 - 35)  SpO2: 94% (21 Dec 2018 17:00) (75% - 97%)    Blood Gas Profile - Arterial (12.21.18 @ 15:10)    pH, Arterial: 7.40    pCO2, Arterial: 35 mmHg    pO2, Arterial: 47 mmHg    HCO3, Arterial: 21 mmoL/L    Base Excess, Arterial: -3.0 mmoL/L    Oxygen Saturation, Arterial: 81 %    FIO2, Arterial: 100      #)Acute hypoxic resp failure Likely PE (Sinus tach, hypoxic, high A-a gradient new DVT)  -Hemodynamically stable, no need for CTA, spoke with pulm fellow on call c/w high flow.  -not able to tolerate face mask started on high flow  -c/w high flow, heparin drip  -Follow up PTT  -Informed cardiac fellow about the status.  -Follow up with official echo results

## 2018-12-21 NOTE — PROGRESS NOTE ADULT - SUBJECTIVE AND OBJECTIVE BOX
PREOPERATIVE DAY OF PROCEDURE EVALUATION:  I have personally seen and examined the patient.  I agree with the history and physical which I have reviewed and noted any changes below.  (Signed electronically by __Dr Gonzalez________)  12-21-18 @ 10:42

## 2018-12-21 NOTE — CHART NOTE - NSCHARTNOTEFT_GEN_A_CORE
Patient was desaturating in 80s. He could not tolerate high flow non rebreather. We tried different masks but he could not tolerate so he was put on High flow 50%. He is now saturating in 90s%. stat CXR was ordered which showed pulmonary congestion. Iv Lasix 40mg stat was given. ABGs showed Normal Ph, Normal pco2. Po2 in 40s. EKG unchanged from prior. Bedside USG showed diffuse B lines. Echo result is pending. Stopped the IVF. Pt also exhibits some component of anxiety.

## 2018-12-21 NOTE — CHART NOTE - NSCHARTNOTEFT_GEN_A_CORE
I have personally seen and examined the patient.  I agree with the history and physical which I have reviewed and noted any changes below.  12-21-18 @ 12:44    PRE-OP DIAGNOSIS: NSTEMI     PROCEDURE: [facility]     Physician: DR Gonzalez  Assistant: Dr Teague    ANESTHESIA TYPE:  [  ]General Anesthesia  [  ] Sedation  [x ] Local/Regional    CONDITION  [  ] Critical  [  ] Serious  [  ]Fair  [ x ]Good      IV CONTRAST:     350        mL      FINDINGS    Left Heart Catheterization:    LEFT HEART CATHERIZATION                                    Left main patent     LAD:   Prox LAD 80-90%                         Left Circumflex: MId LCx 100% , VANDANA 0    Right Cornary Artery: Patent     INTERVENTION  PCI COBRA stent to mid LCX             PLAN OF CARE  [ x] Return to In-patient bed CCU  [ x]  Continue DAPT, B-blocker & Statin therapy

## 2018-12-21 NOTE — PROGRESS NOTE ADULT - ASSESSMENT
84 y/o M with PMHx glaucoma, no cardiac hx, c/o sob on minimal exertion x2 days. EKG showed new onset LBBB.    #CODE STEMI-new LBBB, signs of fluid overload (on 3L nc sating 94%, +congestion on cxr, +crackles)  -loaded with asa, plavix, heparin gtt  -went for cath with Dr Craig 12/21  -npo after midnight   -monitor ptt -acs protocol  -CE x 3 0.75 > 0.75 > 0.65  -got one time dose of lasix  -Echo ordered  -12/20 Pt with rate dependent LBBB, had several runs of sustained afib RVR in 150s-160s. Pt asymptomatic, normotensive during episodes. 12 lead EKG showed afib RVR with LBBB. received amio bolus and drip.    #Glaucoma:   - c/w eye drops      DVT PPx: on therapeutic heparin gtt  GI PPx: Not indicated  Activity as tolerated  Diet: regular  Dispo: Home

## 2018-12-21 NOTE — PROGRESS NOTE ADULT - SUBJECTIVE AND OBJECTIVE BOX
Patient is a 83y old Male who presented with a chief complaint of CODE STEMI (20 Dec 2018 14:31)  Currently admitted to medicine with the primary diagnosis of STEMI (ST elevation myocardial infarction)     Today is hospital day 3d. This morning he is resting comfortably in bed and reports no new issues or overnight events.   CCU/ICU day: 03  Intubated: No  Central Line: No  [place]: No  NG: No  Drips: Heparin  Iv Lines: 1    PAST MEDICAL & SURGICAL HISTORY  Glaucoma  History of appendectomy    SOCIAL HISTORY:  Negative for smoking/alcohol/drug use.     ALLERGIES:  No Known Allergies    MEDICATIONS:  STANDING MEDICATIONS  amiodarone Infusion 1 mG/Min IV Continuous <Continuous>  amiodarone Infusion 0.5 mG/Min IV Continuous <Continuous>  aspirin  chewable 81 milliGRAM(s) Oral daily  atorvastatin 40 milliGRAM(s) Oral at bedtime  chlorhexidine 4% Liquid 1 Application(s) Topical <User Schedule>  clopidogrel Tablet 75 milliGRAM(s) Oral daily  heparin  Infusion.  Unit(s)/Hr IV Continuous <Continuous>  metoprolol tartrate 12.5 milliGRAM(s) Oral two times a day    PRN MEDICATIONS    Home Medications:  timolol hemihydrate 0.5% ophthalmic solution: 1 drop(s) to each affected eye 2 times a day (19 Dec 2018 19:04)    VITALS:   T(F): 97.3  HR: 82  BP: 121/74  RR: 24  SpO2: 95%    LABS:                        12.4   12.63 )-----------( 223      ( 21 Dec 2018 04:22 )             37.9     12-20    139  |  99  |  40<H>  ----------------------------<  109<H>  4.6   |  21  |  1.1    Ca    8.9      20 Dec 2018 04:25    TPro  7.8  /  Alb  4.2  /  TBili  1.7<H>  /  DBili  x   /  AST  59<H>  /  ALT  57<H>  /  AlkPhos  110  12-19    PT/INR - ( 21 Dec 2018 04:22 )   PT: 20.10 sec;   INR: 1.76 ratio         PTT - ( 21 Dec 2018 04:22 )  PTT:54.0 sec      Troponin T, Serum: 0.66 ng/mL <HH> (12-21-18 @ 04:22)  Creatine Kinase, Serum: 61 U/L (12-20-18 @ 19:30)  Troponin T, Serum: 0.76 ng/mL <HH> (12-20-18 @ 19:30)      CARDIAC MARKERS ( 21 Dec 2018 04:22 )  x     / 0.66 ng/mL / x     / x     / x      CARDIAC MARKERS ( 20 Dec 2018 19:30 )  x     / 0.76 ng/mL / 61 U/L / x     / 2.2 ng/mL  CARDIAC MARKERS ( 20 Dec 2018 04:25 )  x     / 0.65 ng/mL / 78 U/L / x     / 3.4 ng/mL  CARDIAC MARKERS ( 20 Dec 2018 01:23 )  x     / 0.75 ng/mL / 89 U/L / x     / 4.4 ng/mL  CARDIAC MARKERS ( 19 Dec 2018 19:42 )  x     / 0.75 ng/mL / x     / x     / x          RADIOLOGY:  < from: Xray Chest 1 View-PORTABLE IMMEDIATE (12.19.18 @ 20:01) >  Impression:    Mild vascular congestion. No parenchymal opacity pleural effusion or air   leak    < from: Xray Chest 1 View- PORTABLE-Routine (12.20.18 @ 05:16) >  Impression:    Improving opacifications.    PHYSICAL EXAM:  GEN: No acute distress  LUNGS: Clear to auscultation bilaterally   HEART: S1/S2 present. RRR.   ABD: Soft, non-tender, non-distended. Bowel sounds present  NEURO: AAOX3

## 2018-12-22 LAB
ALBUMIN SERPL ELPH-MCNC: 3 G/DL — LOW (ref 3.5–5.2)
ALBUMIN SERPL ELPH-MCNC: 3.2 G/DL — LOW (ref 3.5–5.2)
ALBUMIN SERPL ELPH-MCNC: 3.2 G/DL — LOW (ref 3.5–5.2)
ALP SERPL-CCNC: 86 U/L — SIGNIFICANT CHANGE UP (ref 30–115)
ALP SERPL-CCNC: 91 U/L — SIGNIFICANT CHANGE UP (ref 30–115)
ALP SERPL-CCNC: 91 U/L — SIGNIFICANT CHANGE UP (ref 30–115)
ALT FLD-CCNC: 379 U/L — HIGH (ref 0–41)
ALT FLD-CCNC: 437 U/L — HIGH (ref 0–41)
ALT FLD-CCNC: 510 U/L — HIGH (ref 0–41)
ANION GAP SERPL CALC-SCNC: 19 MMOL/L — HIGH (ref 7–14)
ANION GAP SERPL CALC-SCNC: 22 MMOL/L — HIGH (ref 7–14)
ANION GAP SERPL CALC-SCNC: 23 MMOL/L — HIGH (ref 7–14)
ANION GAP SERPL CALC-SCNC: 25 MMOL/L — HIGH (ref 7–14)
APTT BLD: 127.1 SEC — CRITICAL HIGH (ref 27–39.2)
APTT BLD: 133.7 SEC — CRITICAL HIGH (ref 27–39.2)
APTT BLD: 36.8 SEC — SIGNIFICANT CHANGE UP (ref 27–39.2)
APTT BLD: 54.6 SEC — HIGH (ref 27–39.2)
AST SERPL-CCNC: 187 U/L — HIGH (ref 0–41)
AST SERPL-CCNC: 265 U/L — HIGH (ref 0–41)
AST SERPL-CCNC: 611 U/L — HIGH (ref 0–41)
BASE EXCESS BLDA CALC-SCNC: -2 MMOL/L — SIGNIFICANT CHANGE UP (ref -2–2)
BASOPHILS # BLD AUTO: 0.03 K/UL — SIGNIFICANT CHANGE UP (ref 0–0.2)
BASOPHILS NFR BLD AUTO: 0.2 % — SIGNIFICANT CHANGE UP (ref 0–1)
BILIRUB DIRECT SERPL-MCNC: 0.4 MG/DL — HIGH (ref 0–0.2)
BILIRUB INDIRECT FLD-MCNC: 1 MG/DL — SIGNIFICANT CHANGE UP (ref 0.2–1.2)
BILIRUB SERPL-MCNC: 1.1 MG/DL — SIGNIFICANT CHANGE UP (ref 0.2–1.2)
BILIRUB SERPL-MCNC: 1.2 MG/DL — SIGNIFICANT CHANGE UP (ref 0.2–1.2)
BILIRUB SERPL-MCNC: 1.4 MG/DL — HIGH (ref 0.2–1.2)
BUN SERPL-MCNC: 59 MG/DL — HIGH (ref 10–20)
BUN SERPL-MCNC: 60 MG/DL — HIGH (ref 10–20)
BUN SERPL-MCNC: 73 MG/DL — CRITICAL HIGH (ref 10–20)
BUN SERPL-MCNC: 76 MG/DL — CRITICAL HIGH (ref 10–20)
CALCIUM SERPL-MCNC: 8.8 MG/DL — SIGNIFICANT CHANGE UP (ref 8.5–10.1)
CALCIUM SERPL-MCNC: 9 MG/DL — SIGNIFICANT CHANGE UP (ref 8.5–10.1)
CALCIUM SERPL-MCNC: 9.1 MG/DL — SIGNIFICANT CHANGE UP (ref 8.5–10.1)
CALCIUM SERPL-MCNC: 9.2 MG/DL — SIGNIFICANT CHANGE UP (ref 8.5–10.1)
CHLORIDE SERPL-SCNC: 92 MMOL/L — LOW (ref 98–110)
CHLORIDE SERPL-SCNC: 93 MMOL/L — LOW (ref 98–110)
CHLORIDE SERPL-SCNC: 96 MMOL/L — LOW (ref 98–110)
CHLORIDE SERPL-SCNC: 98 MMOL/L — SIGNIFICANT CHANGE UP (ref 98–110)
CK MB CFR SERPL CALC: 7.3 NG/ML — HIGH (ref 0.6–6.3)
CK SERPL-CCNC: 88 U/L — SIGNIFICANT CHANGE UP (ref 0–225)
CO2 SERPL-SCNC: 17 MMOL/L — SIGNIFICANT CHANGE UP (ref 17–32)
CO2 SERPL-SCNC: 19 MMOL/L — SIGNIFICANT CHANGE UP (ref 17–32)
CO2 SERPL-SCNC: 19 MMOL/L — SIGNIFICANT CHANGE UP (ref 17–32)
CO2 SERPL-SCNC: 21 MMOL/L — SIGNIFICANT CHANGE UP (ref 17–32)
CREAT SERPL-MCNC: 1.7 MG/DL — HIGH (ref 0.7–1.5)
CREAT SERPL-MCNC: 1.8 MG/DL — HIGH (ref 0.7–1.5)
CREAT SERPL-MCNC: 2.4 MG/DL — HIGH (ref 0.7–1.5)
CREAT SERPL-MCNC: 2.7 MG/DL — HIGH (ref 0.7–1.5)
EOSINOPHIL # BLD AUTO: 0 K/UL — SIGNIFICANT CHANGE UP (ref 0–0.7)
EOSINOPHIL NFR BLD AUTO: 0 % — SIGNIFICANT CHANGE UP (ref 0–8)
GLUCOSE SERPL-MCNC: 112 MG/DL — HIGH (ref 70–99)
GLUCOSE SERPL-MCNC: 118 MG/DL — HIGH (ref 70–99)
GLUCOSE SERPL-MCNC: 96 MG/DL — SIGNIFICANT CHANGE UP (ref 70–99)
GLUCOSE SERPL-MCNC: 99 MG/DL — SIGNIFICANT CHANGE UP (ref 70–99)
HCO3 BLDA-SCNC: 21 MMOL/L — LOW (ref 23–27)
HCT VFR BLD CALC: 39.6 % — LOW (ref 42–52)
HGB BLD-MCNC: 13.1 G/DL — LOW (ref 14–18)
HOROWITZ INDEX BLDA+IHG-RTO: 60 — SIGNIFICANT CHANGE UP
IMM GRANULOCYTES NFR BLD AUTO: 0.9 % — HIGH (ref 0.1–0.3)
LYMPHOCYTES # BLD AUTO: 13.5 % — LOW (ref 20.5–51.1)
LYMPHOCYTES # BLD AUTO: 2.08 K/UL — SIGNIFICANT CHANGE UP (ref 1.2–3.4)
MAGNESIUM SERPL-MCNC: 2.3 MG/DL — SIGNIFICANT CHANGE UP (ref 1.8–2.4)
MCHC RBC-ENTMCNC: 28.5 PG — SIGNIFICANT CHANGE UP (ref 27–31)
MCHC RBC-ENTMCNC: 33.1 G/DL — SIGNIFICANT CHANGE UP (ref 32–37)
MCV RBC AUTO: 86.3 FL — SIGNIFICANT CHANGE UP (ref 80–94)
MONOCYTES # BLD AUTO: 1.24 K/UL — HIGH (ref 0.1–0.6)
MONOCYTES NFR BLD AUTO: 8.1 % — SIGNIFICANT CHANGE UP (ref 1.7–9.3)
NEUTROPHILS # BLD AUTO: 11.87 K/UL — HIGH (ref 1.4–6.5)
NEUTROPHILS NFR BLD AUTO: 77.3 % — HIGH (ref 42.2–75.2)
NRBC # BLD: 0 /100 WBCS — SIGNIFICANT CHANGE UP (ref 0–0)
OSMOLALITY UR: 453 MOS/KG — SIGNIFICANT CHANGE UP (ref 50–1400)
PCO2 BLDA: 31 MMHG — LOW (ref 38–42)
PH BLDA: 7.44 — HIGH (ref 7.38–7.42)
PLATELET # BLD AUTO: 246 K/UL — SIGNIFICANT CHANGE UP (ref 130–400)
PO2 BLDA: 106 MMHG — HIGH (ref 78–95)
POTASSIUM SERPL-MCNC: 5.1 MMOL/L — HIGH (ref 3.5–5)
POTASSIUM SERPL-MCNC: 5.2 MMOL/L — HIGH (ref 3.5–5)
POTASSIUM SERPL-MCNC: 5.2 MMOL/L — HIGH (ref 3.5–5)
POTASSIUM SERPL-MCNC: 5.3 MMOL/L — HIGH (ref 3.5–5)
POTASSIUM SERPL-SCNC: 5.1 MMOL/L — HIGH (ref 3.5–5)
POTASSIUM SERPL-SCNC: 5.2 MMOL/L — HIGH (ref 3.5–5)
POTASSIUM SERPL-SCNC: 5.2 MMOL/L — HIGH (ref 3.5–5)
POTASSIUM SERPL-SCNC: 5.3 MMOL/L — HIGH (ref 3.5–5)
PROT SERPL-MCNC: 5.8 G/DL — LOW (ref 6–8)
PROT SERPL-MCNC: 6.1 G/DL — SIGNIFICANT CHANGE UP (ref 6–8)
PROT SERPL-MCNC: 6.1 G/DL — SIGNIFICANT CHANGE UP (ref 6–8)
RBC # BLD: 4.59 M/UL — LOW (ref 4.7–6.1)
RBC # FLD: 13.8 % — SIGNIFICANT CHANGE UP (ref 11.5–14.5)
SAO2 % BLDA: 98 % — SIGNIFICANT CHANGE UP (ref 94–98)
SODIUM SERPL-SCNC: 134 MMOL/L — LOW (ref 135–146)
SODIUM SERPL-SCNC: 134 MMOL/L — LOW (ref 135–146)
SODIUM SERPL-SCNC: 138 MMOL/L — SIGNIFICANT CHANGE UP (ref 135–146)
SODIUM SERPL-SCNC: 138 MMOL/L — SIGNIFICANT CHANGE UP (ref 135–146)
SODIUM UR-SCNC: 23 MMOL/L — SIGNIFICANT CHANGE UP
TROPONIN T SERPL-MCNC: 1.86 NG/ML — CRITICAL HIGH
UUN UR-MCNC: 435 MG/DL — SIGNIFICANT CHANGE UP
WBC # BLD: 15.36 K/UL — HIGH (ref 4.8–10.8)
WBC # FLD AUTO: 15.36 K/UL — HIGH (ref 4.8–10.8)

## 2018-12-22 RX ORDER — FUROSEMIDE 40 MG
40 TABLET ORAL DAILY
Qty: 0 | Refills: 0 | Status: DISCONTINUED | OUTPATIENT
Start: 2018-12-22 | End: 2018-12-24

## 2018-12-22 RX ORDER — HEPARIN SODIUM 5000 [USP'U]/ML
1200 INJECTION INTRAVENOUS; SUBCUTANEOUS
Qty: 25000 | Refills: 0 | Status: DISCONTINUED | OUTPATIENT
Start: 2018-12-22 | End: 2018-12-22

## 2018-12-22 RX ORDER — HEPARIN SODIUM 5000 [USP'U]/ML
1000 INJECTION INTRAVENOUS; SUBCUTANEOUS
Qty: 25000 | Refills: 0 | Status: DISCONTINUED | OUTPATIENT
Start: 2018-12-22 | End: 2018-12-26

## 2018-12-22 RX ORDER — HEPARIN SODIUM 5000 [USP'U]/ML
900 INJECTION INTRAVENOUS; SUBCUTANEOUS
Qty: 25000 | Refills: 0 | Status: DISCONTINUED | OUTPATIENT
Start: 2018-12-22 | End: 2018-12-22

## 2018-12-22 RX ORDER — FUROSEMIDE 40 MG
40 TABLET ORAL ONCE
Qty: 0 | Refills: 0 | Status: COMPLETED | OUTPATIENT
Start: 2018-12-22 | End: 2018-12-22

## 2018-12-22 RX ORDER — TIMOLOL 0.5 %
1 DROPS OPHTHALMIC (EYE) DAILY
Qty: 0 | Refills: 0 | Status: DISCONTINUED | OUTPATIENT
Start: 2018-12-22 | End: 2018-12-28

## 2018-12-22 RX ADMIN — ATORVASTATIN CALCIUM 40 MILLIGRAM(S): 80 TABLET, FILM COATED ORAL at 21:23

## 2018-12-22 RX ADMIN — Medication 12.5 MILLIGRAM(S): at 06:19

## 2018-12-22 RX ADMIN — HEPARIN SODIUM 9 UNIT(S)/HR: 5000 INJECTION INTRAVENOUS; SUBCUTANEOUS at 10:26

## 2018-12-22 RX ADMIN — Medication 81 MILLIGRAM(S): at 11:42

## 2018-12-22 RX ADMIN — Medication 12.5 MILLIGRAM(S): at 17:28

## 2018-12-22 RX ADMIN — HEPARIN SODIUM 10 UNIT(S)/HR: 5000 INJECTION INTRAVENOUS; SUBCUTANEOUS at 20:00

## 2018-12-22 RX ADMIN — Medication 40 MILLIGRAM(S): at 10:00

## 2018-12-22 RX ADMIN — CHLORHEXIDINE GLUCONATE 1 APPLICATION(S): 213 SOLUTION TOPICAL at 06:19

## 2018-12-22 RX ADMIN — HEPARIN SODIUM 12 UNIT(S)/HR: 5000 INJECTION INTRAVENOUS; SUBCUTANEOUS at 01:23

## 2018-12-22 RX ADMIN — Medication 40 MILLIGRAM(S): at 22:12

## 2018-12-22 RX ADMIN — AMIODARONE HYDROCHLORIDE 200 MILLIGRAM(S): 400 TABLET ORAL at 06:19

## 2018-12-22 RX ADMIN — Medication 1 DROP(S): at 11:42

## 2018-12-22 RX ADMIN — CLOPIDOGREL BISULFATE 75 MILLIGRAM(S): 75 TABLET, FILM COATED ORAL at 11:42

## 2018-12-22 RX ADMIN — HEPARIN SODIUM 12 UNIT(S)/HR: 5000 INJECTION INTRAVENOUS; SUBCUTANEOUS at 07:00

## 2018-12-22 RX ADMIN — HEPARIN SODIUM 12 UNIT(S)/HR: 5000 INJECTION INTRAVENOUS; SUBCUTANEOUS at 01:30

## 2018-12-22 RX ADMIN — HEPARIN SODIUM 9 UNIT(S)/HR: 5000 INJECTION INTRAVENOUS; SUBCUTANEOUS at 07:10

## 2018-12-22 NOTE — PROVIDER CONTACT NOTE (OTHER) - SITUATION
patient with episode of bleeding from meatus upon trying to have a bowel movement, patient with urine output of 10 cc for 9pm and 20 cc for 10 pm

## 2018-12-22 NOTE — PROGRESS NOTE ADULT - SUBJECTIVE AND OBJECTIVE BOX
SUBJ:  NSTEMI, s/p cath PCI to LCx (100% lesion) , overnight, patient get desaturated, CXR showed pulmonary congestion , lab test revealed KIAN, patient received IV lasix. Hemodynamically stable.   Seen at bedside, sitting in chair, complains of mild dyspnea, denies chest pain or palpitation, vital signs stable, on Venturi mask sating ~ 95%,   EKG reviewed no new acute ischemic changes, echo showed normal EF , mild to moderate MR, mild AS, and severe pulmonary HTN.   venous doppler showed calf DVT, started on heparin drip.     MEDICATIONS  (STANDING):  amiodarone    Tablet 200 milliGRAM(s) Oral two times a day  aspirin  chewable 81 milliGRAM(s) Oral daily  atorvastatin 40 milliGRAM(s) Oral at bedtime  chlorhexidine 4% Liquid 1 Application(s) Topical <User Schedule>  clopidogrel Tablet 75 milliGRAM(s) Oral daily  heparin  Infusion 900 Unit(s)/Hr (9 mL/Hr) IV Continuous <Continuous>  metoprolol tartrate 12.5 milliGRAM(s) Oral two times a day  timolol 0.5% Solution 1 Drop(s) Both EYES daily        Vital Signs Last 24 Hrs  T(C): 35.1 (22 Dec 2018 07:58), Max: 36.6 (22 Dec 2018 00:32)  T(F): 95.1 (22 Dec 2018 07:58), Max: 97.9 (22 Dec 2018 00:32)  HR: 78 (22 Dec 2018 09:32) (72 - 108)  BP: 111/59 (22 Dec 2018 09:32) (94/59 - 168/90)  BP(mean): 79 (22 Dec 2018 09:32) (68 - 124)  RR: 21 (22 Dec 2018 09:32) (21 - 31)  SpO2: 97% (22 Dec 2018 09:32) (75% - 99%)        PHYSICAL EXAM:  · CONSTITUTIONAL: normal   ·RESPIRATORY:  decrease basal airways entries, basal rales  · CARDIOVASCULAR	regular rate and rhythm  no rub  no murmur  normal PMI  · EXTREMITIES: No cyanosis, clubbing or edema  · VASCULAR: 	Equal and normal pulses (carotid, femoral, dorsalis pedis)  	  TELEMETRY: sinus rhythm intermittent LBBB    ECG: < from: 12 Lead ECG (12.22.18 @ 08:11) >  Normal sinus rhythm  Cannot rule out Inferior infarct , age undetermined  Abnormal ECG    < end of copied text >      TTE:  < from: Transthoracic Echocardiogram (12.21.18 @ 09:53) >  Summary:   1. Normal global left ventricular systolic function.   2. Mild mitral annular calcification.   3. Moderate tricuspid regurgitation.   4. PSAP at least 93 which is markedly elevaated.   5. Peak gradient of 17 and a mean of 7 c/w mild AS.    < end of copied text >      LABS:                        13.1   15.36 )-----------( 246      ( 22 Dec 2018 04:30 )             39.6     12-22    138  |  98  |  60<H>  ----------------------------<  118<H>  5.2<H>   |  21  |  1.8<H>    Ca    8.8      22 Dec 2018 04:30  Mg     2.3     12-22      CARDIAC MARKERS ( 21 Dec 2018 04:22 )  x     / 0.66 ng/mL / x     / x     / x      CARDIAC MARKERS ( 20 Dec 2018 19:30 )  x     / 0.76 ng/mL / 61 U/L / x     / 2.2 ng/mL      PT/INR - ( 21 Dec 2018 04:22 )   PT: 20.10 sec;   INR: 1.76 ratio         PTT - ( 22 Dec 2018 04:30 )  PTT:127.1 sec    I&O's Summary    21 Dec 2018 07:01  -  22 Dec 2018 07:00  --------------------------------------------------------  IN: 1032 mL / OUT: 1226 mL / NET: -194 mL    22 Dec 2018 07:01  -  22 Dec 2018 10:50  --------------------------------------------------------  IN: 267 mL / OUT: 0 mL / NET: 267 mL      pro BNP 6452  RADIOLOGY & ADDITIONAL STUDIES:  < from: Xray Chest 1 View- PORTABLE-Urgent (12.21.18 @ 15:33) >  Impression:      Interval development bilateral airspace opacities, consider CHF in the   appropriate clinical context.     No pneumothorax.    < end of copied text > SUBJ:  NSTEMI, s/p cath PCI to LCx (100% lesion) , overnight, patient get desaturated, CXR showed pulmonary congestion , lab test revealed KIAN, patient received IV lasix. Hemodynamically stable.   Seen at bedside, sitting in chair, complains of mild dyspnea, denies chest pain or palpitation, vital signs stable, on Venturi mask sating ~ 95%,   EKG reviewed no new acute ischemic changes, echo showed normal EF , mild to moderate MR, mild AS, and severe pulmonary HTN.   venous doppler showed calf DVT, started on heparin drip.     MEDICATIONS  (STANDING):  amiodarone    Tablet 200 milliGRAM(s) Oral two times a day  aspirin  chewable 81 milliGRAM(s) Oral daily  atorvastatin 40 milliGRAM(s) Oral at bedtime  chlorhexidine 4% Liquid 1 Application(s) Topical <User Schedule>  clopidogrel Tablet 75 milliGRAM(s) Oral daily  heparin  Infusion 900 Unit(s)/Hr (9 mL/Hr) IV Continuous <Continuous>  metoprolol tartrate 12.5 milliGRAM(s) Oral two times a day  timolol 0.5% Solution 1 Drop(s) Both EYES daily        Vital Signs Last 24 Hrs  T(C): 35.1 (22 Dec 2018 07:58), Max: 36.6 (22 Dec 2018 00:32)  T(F): 95.1 (22 Dec 2018 07:58), Max: 97.9 (22 Dec 2018 00:32)  HR: 78 (22 Dec 2018 09:32) (72 - 108)  BP: 111/59 (22 Dec 2018 09:32) (94/59 - 168/90)  BP(mean): 79 (22 Dec 2018 09:32) (68 - 124)  RR: 21 (22 Dec 2018 09:32) (21 - 31)  SpO2: 97% (22 Dec 2018 09:32) (75% - 99%)        PHYSICAL EXAM:  · CONSTITUTIONAL: normal   ·RESPIRATORY:  Crackles bilaterally  · CARDIOVASCULAR	regular rate, LAITH   · EXTREMITIES: No cyanosis, clubbing or edema  · VASCULAR: 	Equal and normal pulses (carotid, femoral, dorsalis pedis)  	  TELEMETRY: sinus rhythm intermittent LBBB    ECG: < from: 12 Lead ECG (12.22.18 @ 08:11) >  Normal sinus rhythm  Cannot rule out Inferior infarct , age undetermined  Abnormal ECG    < end of copied text >      TTE:  < from: Transthoracic Echocardiogram (12.21.18 @ 09:53) >  Summary:   1. Normal global left ventricular systolic function.   2. Mild mitral annular calcification.   3. Moderate tricuspid regurgitation.   4. PSAP at least 93 which is markedly elevaated.   5. Peak gradient of 17 and a mean of 7 c/w mild AS.    < end of copied text >      LABS:                        13.1   15.36 )-----------( 246      ( 22 Dec 2018 04:30 )             39.6     12-22    138  |  98  |  60<H>  ----------------------------<  118<H>  5.2<H>   |  21  |  1.8<H>    Ca    8.8      22 Dec 2018 04:30  Mg     2.3     12-22      CARDIAC MARKERS ( 21 Dec 2018 04:22 )  x     / 0.66 ng/mL / x     / x     / x      CARDIAC MARKERS ( 20 Dec 2018 19:30 )  x     / 0.76 ng/mL / 61 U/L / x     / 2.2 ng/mL      PT/INR - ( 21 Dec 2018 04:22 )   PT: 20.10 sec;   INR: 1.76 ratio         PTT - ( 22 Dec 2018 04:30 )  PTT:127.1 sec    I&O's Summary    21 Dec 2018 07:01  -  22 Dec 2018 07:00  --------------------------------------------------------  IN: 1032 mL / OUT: 1226 mL / NET: -194 mL    22 Dec 2018 07:01  -  22 Dec 2018 10:50  --------------------------------------------------------  IN: 267 mL / OUT: 0 mL / NET: 267 mL      pro BNP 6452  RADIOLOGY & ADDITIONAL STUDIES:  < from: Xray Chest 1 View- PORTABLE-Urgent (12.21.18 @ 15:33) >  Impression:      Interval development bilateral airspace opacities, consider CHF in the   appropriate clinical context.     No pneumothorax.    < end of copied text >

## 2018-12-22 NOTE — PROGRESS NOTE ADULT - ASSESSMENT
-NSTEMI, s/p PCI to Lcx  -Desaturation in setting of pulmonary congestio  -KIAN, decrease urine output  -Calf DVT    Plan:  CCu monitoring  continue ASA Plavix  heparin drip monitor cbc PTT  Lasix 40 mg IV and monitor urine output   monitor kidney function and maintain electrolytes within normal ranges  Rpeat BMP afternoon  nephrology evaluation  Bladder ultrasound if retain urine, insert monaco catheter  continue statin  continue BB  Will continue to follow -NSTEMI, s/p PCI to Lcx  -Desaturation in setting of pulmonary congestio  -KIAN, decrease urine output  -Calf DVT    Plan:    CCU monitoring and continue BIPAP  Repeat 2Decho to R/O possible severe MR  Renal evaluation  continue ASA Plavix  heparin drip monitor cbc PTT  Lasix 40 mg IV and monitor urine output   monitor kidney function and maintain electrolytes within normal ranges  Repeat BMP afternoon  nephrology evaluation  Bladder ultrasound if retain urine, insert monaco catheter  continue statin  continue BB  D/C Amiodarone  Trend creatinine  Give IV lasix 40 mg at nite  Will continue to follow

## 2018-12-22 NOTE — CONSULT NOTE ADULT - SUBJECTIVE AND OBJECTIVE BOX
Patient is a 83y old  Male who presents with a chief complaint of CODE STEMI (22 Dec 2018 08:24)      HPI:  84 y/o M with PMHx glaucoma, no cardiac hx, c/o sob on minimal exertion x2 days. Yesterday he fel as if there was a sense of congestion in his upper airways, which he would only feel if he was exerting himself. Today he was going up and down some stairs and had the same feeling, but denies any chest pain/pressure/neck or jaw pain/nausea/diaphoresis. At baseline he has no exercise limitation, denies any family hx of heart dx, has had no cardiac interventions in the past. He went to Dr Joy's office, who did EKG showing new LBBB. He was sent to [facility] ER, EKG showed same finding, CODE STEMI was called and he was transferred to [place]. s/p cardiac cath stent left circum LAd 80-90%no stent   after procedure hypoxia significant gradiant  doppler left popliteal DVT s/p lasix today feel better   cxr yesterday pulmonary edema         PAST MEDICAL & SURGICAL HISTORY:  Glaucoma  History of appendectomy    Allergies    No Known Allergies    Intolerances      Family history : no cardiovscular family history   Home Medications:  timolol hemihydrate 0.5% ophthalmic solution: 1 drop(s) to each affected eye 2 times a day (19 Dec 2018 19:04)    Occupation:  Alochol: Denied  Smoking: ex  Drug Use: Denied  Marital Status:         ROS: as in HPI; All other systems reviewed are negative    ICU Vital Signs Last 24 Hrs  T(C): 36.3 (22 Dec 2018 04:00), Max: 36.6 (22 Dec 2018 00:32)  T(F): 97.3 (22 Dec 2018 04:00), Max: 97.9 (22 Dec 2018 00:32)  HR: 76 (22 Dec 2018 07:58) (72 - 108)  BP: 111/58 (22 Dec 2018 07:58) (94/59 - 168/90)  BP(mean): 68 (22 Dec 2018 07:58) (68 - 124)  ABP: --  ABP(mean): --  RR: 25 (22 Dec 2018 07:58) (22 - 31)  SpO2: 97% (22 Dec 2018 07:58) (75% - 99%)        Physical Examination:    General: No acute distress.  Alert, oriented, interactive, nonfocal    HEENT: Pupils equal, reactive to light.  Symmetric.    PULM: b/l crackles     CVS: Regular rate and rhythm, no murmurs, rubs, or gallops    ABD: Soft, nondistended, nontender, normoactive bowel sounds, no masses    EXT: No edema, nontender, no clubbing     SKIN: Warm and well perfused, no rashes noted.    Neurology : no motor or sensory deficit     Musculoskeletal : move all extremety     Lymphatic system: no Palpable node           ABG - ( 21 Dec 2018 15:10 )  pH, Arterial: 7.40  pH, Blood: x     /  pCO2: 35    /  pO2: 47    / HCO3: 21    / Base Excess: -3.0  /  SaO2: 81                  I&O's Detail    21 Dec 2018 07:01  -  22 Dec 2018 07:00  --------------------------------------------------------  IN:    amiodarone Infusion: 250.5 mL    heparin  Infusion.: 60 mL    heparin  Infusion.: 34.5 mL    heparin Infusion: 72 mL    heparin Infusion: 30 mL    Oral Fluid: 360 mL    sodium chloride 0.9%: 225 mL  Total IN: 1032 mL    OUT:    Stool: 1 mL    Voided: 1225 mL  Total OUT: 1226 mL    Total NET: -194 mL            LABS:                        13.1   15.36 )-----------( 246      ( 22 Dec 2018 04:30 )             39.6     22 Dec 2018 04:30    138    |  98     |  60     ----------------------------<  118    5.2     |  21     |  1.8      Ca    8.8        22 Dec 2018 04:30  Mg     2.3       22 Dec 2018 04:30        CARDIAC MARKERS ( 21 Dec 2018 04:22 )  x     / 0.66 ng/mL / x     / x     / x      CARDIAC MARKERS ( 20 Dec 2018 19:30 )  x     / 0.76 ng/mL / 61 U/L / x     / 2.2 ng/mL      CAPILLARY BLOOD GLUCOSE        PT/INR - ( 21 Dec 2018 04:22 )   PT: 20.10 sec;   INR: 1.76 ratio         PTT - ( 22 Dec 2018 04:30 )  PTT:127.1 sec    Culture        MEDICATIONS  (STANDING):  amiodarone    Tablet 200 milliGRAM(s) Oral two times a day  aspirin  chewable 81 milliGRAM(s) Oral daily  atorvastatin 40 milliGRAM(s) Oral at bedtime  chlorhexidine 4% Liquid 1 Application(s) Topical <User Schedule>  clopidogrel Tablet 75 milliGRAM(s) Oral daily  metoprolol tartrate 12.5 milliGRAM(s) Oral two times a day    MEDICATIONS  (PRN):        RADIOLOGY: ***     CXR:  TLC:  OG:  ET tube:          ECHO:

## 2018-12-22 NOTE — PROGRESS NOTE ADULT - SUBJECTIVE AND OBJECTIVE BOX
DANILO LINDSAY 83y Male  MRN#: [id number]   SUBJECTIVE  Patient is a 83y old Male who presents with a chief complaint of CODE STEMI (21 Dec 2018 10:44)  Currently admitted to medicine with the primary diagnosis of STEMI (ST elevation myocardial infarction)    OBJECTIVE  PAST MEDICAL & SURGICAL HISTORY  Glaucoma  History of appendectomy    ALLERGIES:  No Known Allergies    MEDICATIONS:  STANDING MEDICATIONS  amiodarone    Tablet 200 milliGRAM(s) Oral two times a day  aspirin  chewable 81 milliGRAM(s) Oral daily  atorvastatin 40 milliGRAM(s) Oral at bedtime  chlorhexidine 4% Liquid 1 Application(s) Topical <User Schedule>  clopidogrel Tablet 75 milliGRAM(s) Oral daily  metoprolol tartrate 12.5 milliGRAM(s) Oral two times a day    PRN MEDICATIONS      VITAL SIGNS: Last 24 Hours  T(C): 36.3 (22 Dec 2018 04:00), Max: 36.6 (22 Dec 2018 00:32)  T(F): 97.3 (22 Dec 2018 04:00), Max: 97.9 (22 Dec 2018 00:32)  HR: 76 (22 Dec 2018 07:58) (72 - 108)  BP: 111/58 (22 Dec 2018 07:58) (94/59 - 168/90)  BP(mean): 68 (22 Dec 2018 07:58) (68 - 124)  RR: 25 (22 Dec 2018 07:58) (22 - 31)  SpO2: 97% (22 Dec 2018 07:58) (75% - 99%)    LABS:                        13.1   15.36 )-----------( 246      ( 22 Dec 2018 04:30 )             39.6     12-22    138  |  98  |  60<H>  ----------------------------<  118<H>  5.2<H>   |  21  |  1.8<H>    Ca    8.8      22 Dec 2018 04:30  Mg     2.3     12-22      PT/INR - ( 21 Dec 2018 04:22 )   PT: 20.10 sec;   INR: 1.76 ratio         PTT - ( 22 Dec 2018 04:30 )  PTT:127.1 sec    ABG - ( 21 Dec 2018 15:10 )  pH, Arterial: 7.40  pH, Blood: x     /  pCO2: 35    /  pO2: 47    / HCO3: 21    / Base Excess: -3.0  /  SaO2: 81        CARDIAC MARKERS ( 21 Dec 2018 04:22 )  x     / 0.66 ng/mL / x     / x     / x      CARDIAC MARKERS ( 20 Dec 2018 19:30 )  x     / 0.76 ng/mL / 61 U/L / x     / 2.2 ng/mL      PHYSICAL EXAM:      ASSESSMENT & PLAN DANILO LINDSAY 83y Male  MRN#: [id number]   SUBJECTIVE  Patient is a 83y old Male who presents with a chief complaint of CODE STEMI (21 Dec 2018 10:44)  Currently admitted to medicine with the primary diagnosis of STEMI (ST elevation myocardial infarction)    OBJECTIVE  PAST MEDICAL & SURGICAL HISTORY  Glaucoma  History of appendectomy    ALLERGIES:  No Known Allergies    MEDICATIONS:  STANDING MEDICATIONS  amiodarone    Tablet 200 milliGRAM(s) Oral two times a day  aspirin  chewable 81 milliGRAM(s) Oral daily  atorvastatin 40 milliGRAM(s) Oral at bedtime  chlorhexidine 4% Liquid 1 Application(s) Topical <User Schedule>  clopidogrel Tablet 75 milliGRAM(s) Oral daily  metoprolol tartrate 12.5 milliGRAM(s) Oral two times a day    PRN MEDICATIONS      VITAL SIGNS: Last 24 Hours  T(C): 36.3 (22 Dec 2018 04:00), Max: 36.6 (22 Dec 2018 00:32)  T(F): 97.3 (22 Dec 2018 04:00), Max: 97.9 (22 Dec 2018 00:32)  HR: 76 (22 Dec 2018 07:58) (72 - 108)  BP: 111/58 (22 Dec 2018 07:58) (94/59 - 168/90)  BP(mean): 68 (22 Dec 2018 07:58) (68 - 124)  RR: 25 (22 Dec 2018 07:58) (22 - 31)  SpO2: 97% (22 Dec 2018 07:58) (75% - 99%)    LABS:                        13.1   15.36 )-----------( 246      ( 22 Dec 2018 04:30 )             39.6     12-22    138  |  98  |  60<H>  ----------------------------<  118<H>  5.2<H>   |  21  |  1.8<H>    Ca    8.8      22 Dec 2018 04:30  Mg     2.3     12-22      PT/INR - ( 21 Dec 2018 04:22 )   PT: 20.10 sec;   INR: 1.76 ratio         PTT - ( 22 Dec 2018 04:30 )  PTT:127.1 sec    ABG - ( 21 Dec 2018 15:10 )  pH, Arterial: 7.40  pH, Blood: x     /  pCO2: 35    /  pO2: 47    / HCO3: 21    / Base Excess: -3.0  /  SaO2: 81        CARDIAC MARKERS ( 21 Dec 2018 04:22 )  x     / 0.66 ng/mL / x     / x     / x      CARDIAC MARKERS ( 20 Dec 2018 19:30 )  x     / 0.76 ng/mL / 61 U/L / x     / 2.2 ng/mL      PHYSICAL EXAM:  GEN: No acute distress  LUNGS: Clear to auscultation bilaterally   HEART: S1/S2 present. RRR.   ABD: Soft, non-tender, non-distended. Bowel sounds present  NEURO: AAOX3    ASSESSMENT & PLAN  82 y/o M with PMHx glaucoma, no cardiac hx, c/o sob on minimal exertion x2 days. EKG showed new onset LBBB.    #CODE STEMI-new LBBB, signs of fluid overload (on 3L nc sating 94%, +congestion on cxr, +crackles)  -loaded with asa, plavix, heparin gtt  -went for cath with Dr Craig 12/21  -monitor ptt -acs protocol  -CE x 3 0.75 > 0.75 > 0.65  -got one time dose of lasix  -Echo ordered  -12/20 Pt with rate dependent LBBB, had several runs of sustained afib RVR in 150s-160s. Pt asymptomatic, normotensive during episodes. 12 lead EKG showed afib RVR with LBBB. received amio bolus and drip.    -After the cath yesterday,    Patient was desaturating in 80s. He could not tolerate high flow non rebreather. We tried different masks but he could not tolerate so he was put on High flow 50%. stat CXR was ordered which showed pulmonary congestion. Iv Lasix 40mg stat was given. ABGs showed Normal Ph, Normal pco2. Po2 in 40s. EKG unchanged from prior. Bedside USG showed diffuse B lines. Echo result is pending. Stopped the IVF. Pt also exhibits some component of anxiety.  -Duplex was ordered, shows clot in the left popliteal vein.  -Patient started on Heparin, Please monitor aptt.  -Patient says he is feeling better than yesterday.    #Glaucoma:   - c/w eye drops      DVT PPx: on therapeutic heparin gtt  GI PPx: Not indicated  Activity as tolerated  Diet: regular  Dispo: Home DANILO LINDSAY 83y Male  MRN#: [id number]   SUBJECTIVE  Patient is a 83y old Male who presents with a chief complaint of CODE STEMI (21 Dec 2018 10:44)  Currently admitted to medicine with the primary diagnosis of STEMI (ST elevation myocardial infarction)    OBJECTIVE  PAST MEDICAL & SURGICAL HISTORY  Glaucoma  History of appendectomy    ALLERGIES:  No Known Allergies    MEDICATIONS:  STANDING MEDICATIONS  amiodarone    Tablet 200 milliGRAM(s) Oral two times a day  aspirin  chewable 81 milliGRAM(s) Oral daily  atorvastatin 40 milliGRAM(s) Oral at bedtime  chlorhexidine 4% Liquid 1 Application(s) Topical <User Schedule>  clopidogrel Tablet 75 milliGRAM(s) Oral daily  metoprolol tartrate 12.5 milliGRAM(s) Oral two times a day    PRN MEDICATIONS      VITAL SIGNS: Last 24 Hours  T(C): 36.3 (22 Dec 2018 04:00), Max: 36.6 (22 Dec 2018 00:32)  T(F): 97.3 (22 Dec 2018 04:00), Max: 97.9 (22 Dec 2018 00:32)  HR: 76 (22 Dec 2018 07:58) (72 - 108)  BP: 111/58 (22 Dec 2018 07:58) (94/59 - 168/90)  BP(mean): 68 (22 Dec 2018 07:58) (68 - 124)  RR: 25 (22 Dec 2018 07:58) (22 - 31)  SpO2: 97% (22 Dec 2018 07:58) (75% - 99%)    LABS:                        13.1   15.36 )-----------( 246      ( 22 Dec 2018 04:30 )             39.6     12-22    138  |  98  |  60<H>  ----------------------------<  118<H>  5.2<H>   |  21  |  1.8<H>    Ca    8.8      22 Dec 2018 04:30  Mg     2.3     12-22      PT/INR - ( 21 Dec 2018 04:22 )   PT: 20.10 sec;   INR: 1.76 ratio         PTT - ( 22 Dec 2018 04:30 )  PTT:127.1 sec    ABG - ( 21 Dec 2018 15:10 )  pH, Arterial: 7.40  pH, Blood: x     /  pCO2: 35    /  pO2: 47    / HCO3: 21    / Base Excess: -3.0  /  SaO2: 81        CARDIAC MARKERS ( 21 Dec 2018 04:22 )  x     / 0.66 ng/mL / x     / x     / x      CARDIAC MARKERS ( 20 Dec 2018 19:30 )  x     / 0.76 ng/mL / 61 U/L / x     / 2.2 ng/mL      PHYSICAL EXAM:  GEN: No acute distress  LUNGS: Clear to auscultation bilaterally   HEART: S1/S2 present. RRR.   ABD: Soft, non-tender, non-distended. Bowel sounds present  NEURO: AAOX3    ASSESSMENT & PLAN  82 y/o M with PMHx glaucoma, no cardiac hx, c/o sob on minimal exertion x2 days. EKG showed new onset LBBB.    #CODE STEMI-new LBBB, signs of fluid overload (on 3L nc sating 94%, +congestion on cxr, +crackles)  -loaded with asa, plavix, heparin gtt  -went for cath with Dr Craig 12/21  -monitor ptt -acs protocol  -CE x 3 0.75 > 0.75 > 0.65  -got one time dose of lasix  -Echo ordered  -12/20 Pt with rate dependent LBBB, had several runs of sustained afib RVR in 150s-160s. Pt asymptomatic, normotensive during episodes. 12 lead EKG showed afib RVR with LBBB. received amio bolus and drip.    -After the cath yesterday,    Patient was desaturating in 80s. He could not tolerate high flow non rebreather. We tried different masks but he could not tolerate so he was put on High flow 50%. stat CXR was ordered which showed pulmonary congestion. Iv Lasix 40mg stat was given. ABGs showed Normal Ph, Normal pco2. Po2 in 40s. EKG unchanged from prior. Bedside USG showed diffuse B lines. Echo result is pending. Stopped the IVF. Pt also exhibits some component of anxiety.  -Duplex was ordered, shows clot in the left popliteal vein.  -Patient started on Heparin, Please monitor aptt.  -Patient says he is feeling better than yesterday.  -CXR shows worsening congestion today.    -Question it is due to PE or cardiac cause as patient got a cath yesterday.  -stat echo ordered.  -call cardiology to rule out cardiac cause of lung congestion.    #Glaucoma:   - c/w eye drops      DVT PPx: on therapeutic heparin gtt  GI PPx: Not indicated  Activity as tolerated  Diet: regular  Dispo: Home

## 2018-12-22 NOTE — CONSULT NOTE ADULT - ASSESSMENT
IMPRESSION:  Acute resp failure   most likely secondary to acute pulmonary edema   dvt ?? PE   STEMI  KIAN     PLAN:    CNS: no sedation     HEENT: oral care     PULMONARY: taper fio2 keep pox . 92% cxr stat s/p lasix if still persist b/l opacity will send to     CARDIOVASCULAR: echo now evaluate RV might need lasix will reassess after cxr worsening     GI: GI prophylaxis.  Feeding     RENAL: follow lytes , renal Us renal consult   send urine lytes cr     INFECTIOUS DISEASE: no abx now will follow     HEMATOLOGICAL:  DVT prophylaxis. on heparin drip follow PTT     ENDOCRINE:  Follow up FS.  Insulin protocol if needed.    MUSCULOSKELETAL:        CRITICAL CARE TIME SPENT: *** IMPRESSION:  Acute resp failure   most likely secondary to acute pulmonary edema   dvt ?? PE   STEMI  KIAN     PLAN:    CNS: no sedation     HEENT: oral care     PULMONARY: taper fio2 keep pox . 92% cxr stat s/p lasix if still persist b/l opacity will send to     CARDIOVASCULAR: echo now evaluate RV might need lasix will reassess after cxr worsening     GI: GI prophylaxis.  Feeding     RENAL: follow lytes , renal Us renal consult   send urine lytes cr     INFECTIOUS DISEASE: no abx now will follow     HEMATOLOGICAL:  DVT prophylaxis. on heparin drip follow PTT     ENDOCRINE:  Follow up FS.  Insulin protocol if needed.    addendum note : cxr b/l effusion and opacity CHF so will give lasix   recall cardiology to repeat echo and revaluate   renal consult         CRITICAL CARE TIME SPENT: ***

## 2018-12-23 LAB
ANION GAP SERPL CALC-SCNC: 21 MMOL/L — HIGH (ref 7–14)
ANION GAP SERPL CALC-SCNC: 22 MMOL/L — HIGH (ref 7–14)
ANION GAP SERPL CALC-SCNC: 24 MMOL/L — HIGH (ref 7–14)
APPEARANCE UR: ABNORMAL
APTT BLD: 57.9 SEC — HIGH (ref 27–39.2)
APTT BLD: 59 SEC — HIGH (ref 27–39.2)
APTT BLD: 60 SEC — HIGH (ref 27–39.2)
APTT BLD: 60.7 SEC — HIGH (ref 27–39.2)
APTT BLD: 66.4 SEC — HIGH (ref 27–39.2)
BACTERIA # UR AUTO: ABNORMAL /HPF
BASOPHILS # BLD AUTO: 0.02 K/UL — SIGNIFICANT CHANGE UP (ref 0–0.2)
BASOPHILS # BLD AUTO: 0.03 K/UL — SIGNIFICANT CHANGE UP (ref 0–0.2)
BASOPHILS # BLD AUTO: 0.04 K/UL — SIGNIFICANT CHANGE UP (ref 0–0.2)
BASOPHILS NFR BLD AUTO: 0.2 % — SIGNIFICANT CHANGE UP (ref 0–1)
BASOPHILS NFR BLD AUTO: 0.2 % — SIGNIFICANT CHANGE UP (ref 0–1)
BASOPHILS NFR BLD AUTO: 0.3 % — SIGNIFICANT CHANGE UP (ref 0–1)
BILIRUB UR-MCNC: ABNORMAL
BLD GP AB SCN SERPL QL: SIGNIFICANT CHANGE UP
BUN SERPL-MCNC: 82 MG/DL — CRITICAL HIGH (ref 10–20)
BUN SERPL-MCNC: 90 MG/DL — CRITICAL HIGH (ref 10–20)
BUN SERPL-MCNC: 92 MG/DL — CRITICAL HIGH (ref 10–20)
CALCIUM SERPL-MCNC: 8.8 MG/DL — SIGNIFICANT CHANGE UP (ref 8.5–10.1)
CALCIUM SERPL-MCNC: 8.9 MG/DL — SIGNIFICANT CHANGE UP (ref 8.5–10.1)
CALCIUM SERPL-MCNC: 9 MG/DL — SIGNIFICANT CHANGE UP (ref 8.5–10.1)
CHLORIDE SERPL-SCNC: 88 MMOL/L — LOW (ref 98–110)
CHLORIDE SERPL-SCNC: 90 MMOL/L — LOW (ref 98–110)
CHLORIDE SERPL-SCNC: 93 MMOL/L — LOW (ref 98–110)
CO2 SERPL-SCNC: 17 MMOL/L — SIGNIFICANT CHANGE UP (ref 17–32)
CO2 SERPL-SCNC: 18 MMOL/L — SIGNIFICANT CHANGE UP (ref 17–32)
CO2 SERPL-SCNC: 20 MMOL/L — SIGNIFICANT CHANGE UP (ref 17–32)
COLOR SPEC: ABNORMAL
CREAT ?TM UR-MCNC: 114 MG/DL — SIGNIFICANT CHANGE UP
CREAT SERPL-MCNC: 3 MG/DL — HIGH (ref 0.7–1.5)
CREAT SERPL-MCNC: 3.5 MG/DL — HIGH (ref 0.7–1.5)
CREAT SERPL-MCNC: 3.6 MG/DL — HIGH (ref 0.7–1.5)
DIFF PNL FLD: ABNORMAL
EOSINOPHIL # BLD AUTO: 0.03 K/UL — SIGNIFICANT CHANGE UP (ref 0–0.7)
EOSINOPHIL # BLD AUTO: 0.04 K/UL — SIGNIFICANT CHANGE UP (ref 0–0.7)
EOSINOPHIL # BLD AUTO: 0.05 K/UL — SIGNIFICANT CHANGE UP (ref 0–0.7)
EOSINOPHIL NFR BLD AUTO: 0.2 % — SIGNIFICANT CHANGE UP (ref 0–8)
EOSINOPHIL NFR BLD AUTO: 0.2 % — SIGNIFICANT CHANGE UP (ref 0–8)
EOSINOPHIL NFR BLD AUTO: 0.4 % — SIGNIFICANT CHANGE UP (ref 0–8)
EPI CELLS # UR: ABNORMAL /HPF
GLUCOSE SERPL-MCNC: 107 MG/DL — HIGH (ref 70–99)
GLUCOSE SERPL-MCNC: 115 MG/DL — HIGH (ref 70–99)
GLUCOSE SERPL-MCNC: 85 MG/DL — SIGNIFICANT CHANGE UP (ref 70–99)
GLUCOSE UR QL: NEGATIVE MG/DL — SIGNIFICANT CHANGE UP
GRAN CASTS # UR COMP ASSIST: ABNORMAL /LPF
HCT VFR BLD CALC: 36.8 % — LOW (ref 42–52)
HCT VFR BLD CALC: 38.2 % — LOW (ref 42–52)
HCT VFR BLD CALC: 39.4 % — LOW (ref 42–52)
HCT VFR BLD CALC: 40.3 % — LOW (ref 42–52)
HGB BLD-MCNC: 11.9 G/DL — LOW (ref 14–18)
HGB BLD-MCNC: 12.5 G/DL — LOW (ref 14–18)
HGB BLD-MCNC: 13 G/DL — LOW (ref 14–18)
HGB BLD-MCNC: 13.4 G/DL — LOW (ref 14–18)
IMM GRANULOCYTES NFR BLD AUTO: 0.8 % — HIGH (ref 0.1–0.3)
IMM GRANULOCYTES NFR BLD AUTO: 0.9 % — HIGH (ref 0.1–0.3)
IMM GRANULOCYTES NFR BLD AUTO: 1.1 % — HIGH (ref 0.1–0.3)
INR BLD: 1.53 RATIO — HIGH (ref 0.65–1.3)
KETONES UR-MCNC: NEGATIVE — SIGNIFICANT CHANGE UP
LEUKOCYTE ESTERASE UR-ACNC: ABNORMAL
LYMPHOCYTES # BLD AUTO: 1.63 K/UL — SIGNIFICANT CHANGE UP (ref 1.2–3.4)
LYMPHOCYTES # BLD AUTO: 1.67 K/UL — SIGNIFICANT CHANGE UP (ref 1.2–3.4)
LYMPHOCYTES # BLD AUTO: 1.69 K/UL — SIGNIFICANT CHANGE UP (ref 1.2–3.4)
LYMPHOCYTES # BLD AUTO: 10.3 % — LOW (ref 20.5–51.1)
LYMPHOCYTES # BLD AUTO: 11.5 % — LOW (ref 20.5–51.1)
LYMPHOCYTES # BLD AUTO: 12.7 % — LOW (ref 20.5–51.1)
MAGNESIUM SERPL-MCNC: 2.2 MG/DL — SIGNIFICANT CHANGE UP (ref 1.8–2.4)
MCHC RBC-ENTMCNC: 27.4 PG — SIGNIFICANT CHANGE UP (ref 27–31)
MCHC RBC-ENTMCNC: 27.5 PG — SIGNIFICANT CHANGE UP (ref 27–31)
MCHC RBC-ENTMCNC: 28 PG — SIGNIFICANT CHANGE UP (ref 27–31)
MCHC RBC-ENTMCNC: 28.9 PG — SIGNIFICANT CHANGE UP (ref 27–31)
MCHC RBC-ENTMCNC: 32.3 G/DL — SIGNIFICANT CHANGE UP (ref 32–37)
MCHC RBC-ENTMCNC: 32.3 G/DL — SIGNIFICANT CHANGE UP (ref 32–37)
MCHC RBC-ENTMCNC: 32.7 G/DL — SIGNIFICANT CHANGE UP (ref 32–37)
MCHC RBC-ENTMCNC: 34 G/DL — SIGNIFICANT CHANGE UP (ref 32–37)
MCV RBC AUTO: 84.8 FL — SIGNIFICANT CHANGE UP (ref 80–94)
MCV RBC AUTO: 84.9 FL — SIGNIFICANT CHANGE UP (ref 80–94)
MCV RBC AUTO: 85.4 FL — SIGNIFICANT CHANGE UP (ref 80–94)
MCV RBC AUTO: 85.7 FL — SIGNIFICANT CHANGE UP (ref 80–94)
MONOCYTES # BLD AUTO: 1.32 K/UL — HIGH (ref 0.1–0.6)
MONOCYTES # BLD AUTO: 1.34 K/UL — HIGH (ref 0.1–0.6)
MONOCYTES # BLD AUTO: 1.59 K/UL — HIGH (ref 0.1–0.6)
MONOCYTES NFR BLD AUTO: 10 % — HIGH (ref 1.7–9.3)
MONOCYTES NFR BLD AUTO: 9.5 % — HIGH (ref 1.7–9.3)
MONOCYTES NFR BLD AUTO: 9.7 % — HIGH (ref 1.7–9.3)
NEUTROPHILS # BLD AUTO: 11 K/UL — HIGH (ref 1.4–6.5)
NEUTROPHILS # BLD AUTO: 12.87 K/UL — HIGH (ref 1.4–6.5)
NEUTROPHILS # BLD AUTO: 9.99 K/UL — HIGH (ref 1.4–6.5)
NEUTROPHILS NFR BLD AUTO: 75.6 % — HIGH (ref 42.2–75.2)
NEUTROPHILS NFR BLD AUTO: 77.7 % — HIGH (ref 42.2–75.2)
NEUTROPHILS NFR BLD AUTO: 78.7 % — HIGH (ref 42.2–75.2)
NITRITE UR-MCNC: NEGATIVE — SIGNIFICANT CHANGE UP
NRBC # BLD: 0 /100 WBCS — SIGNIFICANT CHANGE UP (ref 0–0)
PH UR: 5.5 — SIGNIFICANT CHANGE UP (ref 5–8)
PHOSPHATE SERPL-MCNC: 5.5 MG/DL — HIGH (ref 2.1–4.9)
PLATELET # BLD AUTO: 218 K/UL — SIGNIFICANT CHANGE UP (ref 130–400)
PLATELET # BLD AUTO: 224 K/UL — SIGNIFICANT CHANGE UP (ref 130–400)
PLATELET # BLD AUTO: 256 K/UL — SIGNIFICANT CHANGE UP (ref 130–400)
PLATELET # BLD AUTO: 271 K/UL — SIGNIFICANT CHANGE UP (ref 130–400)
POTASSIUM SERPL-MCNC: 4.6 MMOL/L — SIGNIFICANT CHANGE UP (ref 3.5–5)
POTASSIUM SERPL-MCNC: 5 MMOL/L — SIGNIFICANT CHANGE UP (ref 3.5–5)
POTASSIUM SERPL-MCNC: 5.4 MMOL/L — HIGH (ref 3.5–5)
POTASSIUM SERPL-SCNC: 4.6 MMOL/L — SIGNIFICANT CHANGE UP (ref 3.5–5)
POTASSIUM SERPL-SCNC: 5 MMOL/L — SIGNIFICANT CHANGE UP (ref 3.5–5)
POTASSIUM SERPL-SCNC: 5.4 MMOL/L — HIGH (ref 3.5–5)
PROT ?TM UR-MCNC: 21 MG/DLG/24H — SIGNIFICANT CHANGE UP
PROT UR-MCNC: 30 MG/DL
PROT/CREAT UR-RTO: 0.2 RATIO — SIGNIFICANT CHANGE UP (ref 0–0.2)
PROTHROM AB SERPL-ACNC: 17.5 SEC — HIGH (ref 9.95–12.87)
RBC # BLD: 4.34 M/UL — LOW (ref 4.7–6.1)
RBC # BLD: 4.46 M/UL — LOW (ref 4.7–6.1)
RBC # BLD: 4.64 M/UL — LOW (ref 4.7–6.1)
RBC # BLD: 4.72 M/UL — SIGNIFICANT CHANGE UP (ref 4.7–6.1)
RBC # FLD: 13.8 % — SIGNIFICANT CHANGE UP (ref 11.5–14.5)
RBC # FLD: 13.8 % — SIGNIFICANT CHANGE UP (ref 11.5–14.5)
RBC # FLD: 13.9 % — SIGNIFICANT CHANGE UP (ref 11.5–14.5)
RBC # FLD: 13.9 % — SIGNIFICANT CHANGE UP (ref 11.5–14.5)
RBC CASTS # UR COMP ASSIST: >50 /HPF
SODIUM SERPL-SCNC: 129 MMOL/L — LOW (ref 135–146)
SODIUM SERPL-SCNC: 130 MMOL/L — LOW (ref 135–146)
SODIUM SERPL-SCNC: 134 MMOL/L — LOW (ref 135–146)
SP GR SPEC: 1.02 — SIGNIFICANT CHANGE UP (ref 1.01–1.03)
TYPE + AB SCN PNL BLD: SIGNIFICANT CHANGE UP
UROBILINOGEN FLD QL: 1 MG/DL (ref 0.2–0.2)
WBC # BLD: 13.19 K/UL — HIGH (ref 4.8–10.8)
WBC # BLD: 14.16 K/UL — HIGH (ref 4.8–10.8)
WBC # BLD: 14.66 K/UL — HIGH (ref 4.8–10.8)
WBC # BLD: 16.37 K/UL — HIGH (ref 4.8–10.8)
WBC # FLD AUTO: 13.19 K/UL — HIGH (ref 4.8–10.8)
WBC # FLD AUTO: 14.16 K/UL — HIGH (ref 4.8–10.8)
WBC # FLD AUTO: 14.66 K/UL — HIGH (ref 4.8–10.8)
WBC # FLD AUTO: 16.37 K/UL — HIGH (ref 4.8–10.8)
WBC UR QL: ABNORMAL /HPF

## 2018-12-23 RX ORDER — METOPROLOL TARTRATE 50 MG
12.5 TABLET ORAL ONCE
Qty: 0 | Refills: 0 | Status: COMPLETED | OUTPATIENT
Start: 2018-12-23 | End: 2018-12-23

## 2018-12-23 RX ORDER — SODIUM BICARBONATE 1 MEQ/ML
325 SYRINGE (ML) INTRAVENOUS THREE TIMES A DAY
Qty: 0 | Refills: 0 | Status: DISCONTINUED | OUTPATIENT
Start: 2018-12-23 | End: 2018-12-28

## 2018-12-23 RX ORDER — METOPROLOL TARTRATE 50 MG
50 TABLET ORAL
Qty: 0 | Refills: 0 | Status: DISCONTINUED | OUTPATIENT
Start: 2018-12-23 | End: 2018-12-23

## 2018-12-23 RX ORDER — METOPROLOL TARTRATE 50 MG
25 TABLET ORAL
Qty: 0 | Refills: 0 | Status: DISCONTINUED | OUTPATIENT
Start: 2018-12-23 | End: 2018-12-23

## 2018-12-23 RX ORDER — METOPROLOL TARTRATE 50 MG
50 TABLET ORAL
Qty: 0 | Refills: 0 | Status: DISCONTINUED | OUTPATIENT
Start: 2018-12-23 | End: 2018-12-26

## 2018-12-23 RX ADMIN — CLOPIDOGREL BISULFATE 75 MILLIGRAM(S): 75 TABLET, FILM COATED ORAL at 11:58

## 2018-12-23 RX ADMIN — HEPARIN SODIUM 10 UNIT(S)/HR: 5000 INJECTION INTRAVENOUS; SUBCUTANEOUS at 21:26

## 2018-12-23 RX ADMIN — HEPARIN SODIUM 10 UNIT(S)/HR: 5000 INJECTION INTRAVENOUS; SUBCUTANEOUS at 21:31

## 2018-12-23 RX ADMIN — Medication 50 MILLIGRAM(S): at 18:38

## 2018-12-23 RX ADMIN — Medication 325 MILLIGRAM(S): at 21:27

## 2018-12-23 RX ADMIN — Medication 12.5 MILLIGRAM(S): at 07:12

## 2018-12-23 RX ADMIN — Medication 81 MILLIGRAM(S): at 11:58

## 2018-12-23 RX ADMIN — Medication 1 DROP(S): at 11:58

## 2018-12-23 RX ADMIN — Medication 12.5 MILLIGRAM(S): at 06:03

## 2018-12-23 RX ADMIN — ATORVASTATIN CALCIUM 40 MILLIGRAM(S): 80 TABLET, FILM COATED ORAL at 21:27

## 2018-12-23 RX ADMIN — Medication 40 MILLIGRAM(S): at 06:04

## 2018-12-23 NOTE — CONSULT NOTE ADULT - ASSESSMENT
83/M with KIAN following STEMi, angioplasty, new Afib with RVR, CHF.    KIAN due to AKN or ANN  - FeNa appears prerenal (as seen in ANN or heart failure)  - segundo castillo neg for hydro  - cont Linares to monitor Is and Os  - creat 3.0 <-1.1  three days ago  - check phos  - decrease LAsix to 40 IV qd fro q 12 hrs  - no need for RRT now    Afib with RVR - on betablocker and IV heparin  Elevated WBC - monitor for infection   send UA    Avoid hypotension  Will follow 83/M with KIAN following STEMi, angioplasty, new Afib with RVR, CHF.    KIAN due to AKN or ANN  - FeNa appears prerenal (as seen in ANN or heart failure)  - segundo castillo neg for hydro  - cont Linares to monitor Is and Os  - creat 3.0 <-1.1  three days ago  - check phos  - decrease LAsix to 40 IV qd fro q 12 hrs  - no need for RRT now    AG Met acidosis - due to KIAN  - may start NA bicarb 325 po tid    Afib with RVR - on betablocker and IV heparin  Elevated WBC - monitor for infection   send UA    Avoid hypotension  Will follow

## 2018-12-23 NOTE — PROGRESS NOTE ADULT - SUBJECTIVE AND OBJECTIVE BOX
Feeling better today but worsening serum creatinine.   Improved CXR.     MEDICATIONS  (STANDING):  aspirin  chewable 81 milliGRAM(s) Oral daily  atorvastatin 40 milliGRAM(s) Oral at bedtime  chlorhexidine 4% Liquid 1 Application(s) Topical <User Schedule>  clopidogrel Tablet 75 milliGRAM(s) Oral daily  furosemide   Injectable 40 milliGRAM(s) IV Push daily  heparin  Infusion 1000 Unit(s)/Hr (10 mL/Hr) IV Continuous <Continuous>  metoprolol succinate ER 50 milliGRAM(s) Oral two times a day  timolol 0.5% Solution 1 Drop(s) Both EYES daily    MEDICATIONS  (PRN):            Vital Signs Last 24 Hrs  T(C): 36.4 (23 Dec 2018 04:00), Max: 36.7 (22 Dec 2018 20:05)  T(F): 97.5 (23 Dec 2018 04:00), Max: 98.1 (22 Dec 2018 20:05)  HR: 88 (23 Dec 2018 10:14) (82 - 122)  BP: 118/62 (23 Dec 2018 12:00) (91/60 - 128/65)  BP(mean): 65 (23 Dec 2018 10:14) (65 - 89)  RR: 34 (23 Dec 2018 12:00) (22 - 34)  SpO2: 97% (23 Dec 2018 12:00) (94% - 98%)     REVIEW OF SYSTEMS:  CONSTITUTIONAL: No fever, weight loss, or fatigue  CARDIOLOGY: PAtient denies chest pain, shortness of breath or syncopal episodes.   RESPIRATORY: denies shortness of breath, wheezeing.   NEUROLOGICAL: NO weakness, no focal deficits to report.  GI: no BRBPR, no N,V,diarrhea.    PSYCHIATRY: normal mood and affect  HEENT: no nasal discharge, no ecchymosis  SKIN: no ecchymosis, no breakdown  MUSCULOSKELETAL: Full range of motion x4.        PHYSICAL EXAM:  · CONSTITUTIONAL:	Well-developed, well nourished    BMI-  ·RESPIRATORY:   airway patent; breath sounds equal; good air movement; respirations non-labored; clear to auscultation bilaterally; no chest wall tenderness; no intercostal retractions; no rales,rhonchi or wheeze  · CARDIOVASCULAR	regular rate and rhythm  no rub  no murmur  normal PMI  · EXTREMITIES: No cyanosis, clubbing or edema  · VASCULAR: 	Equal and normal pulses (carotid, femoral, dorsalis pedis)  	  TELEMETRY: Sinus with frequent arrhythmias      LABS:                        12.5   14.16 )-----------( 218      ( 23 Dec 2018 04:47 )             38.2     12-23    134<L>  |  93<L>  |  82<HH>  ----------------------------<  85  5.0   |  20  |  3.0<H>    Ca    8.8      23 Dec 2018 04:47  Mg     2.2     12-23    TPro  5.8<L>  /  Alb  3.0<L>  /  TBili  1.1  /  DBili  x   /  AST  187<H>  /  ALT  379<H>  /  AlkPhos  86  12-22    CARDIAC MARKERS ( 22 Dec 2018 11:46 )  x     / 1.86 ng/mL / 88 U/L / x     / 7.3 ng/mL      PTT - ( 23 Dec 2018 11:00 )  PTT:66.4 sec    I&O's Summary    22 Dec 2018 07:01  -  23 Dec 2018 07:00  --------------------------------------------------------  IN: 1238 mL / OUT: 585 mL / NET: 653 mL    23 Dec 2018 07:01  -  23 Dec 2018 12:07  --------------------------------------------------------  IN: 770 mL / OUT: 175 mL / NET: 595 mL      BNP  RADIOLOGY & ADDITIONAL STUDIES:    IMPRESSION AND PLAN:      Continue current care  ASA + Plavix  Lasix if needed  Pulmonary eval  Renal eval  Will review echo images  Will F/U

## 2018-12-23 NOTE — PROGRESS NOTE ADULT - ASSESSMENT
84 y/o M with PMHx glaucoma, no cardiac hx, c/o sob on minimal exertion x2 days. EKG showed new onset LBBB, status post cath, PCI to to mid LCX on     12/21, course complicated by development of DVT , suspicion for Pulmonary embolism and KIAN      Principal Diagnoses:  coronary artery disease status post PCI, stent to mid LCX on 12/21  Non ST elevation Myocardial Infarction   atrial fibrillation with RVR  deep venous thrombosis   KIAN( possibly secondary to ATN) on chronic renal disease stage 3  AG metabolic acidosis  new onset CHF, valvular with mildly reduced EF  Acute resp failure secondary to pulmonary edema  Pulmonary HTN   Severe AS        - started on heparin infusion for DVT, possibility of PE( CTA not performed because of KIAN)  -post PCI care in CCU as per cardiology  -continue with BB, statins and DAPT  -Amiodarone infusion switched to Po  -Electrolyte supplementation and follow up with BMP (Monitor Electrolytes Qdaily, Keep K+>4, Mg>2)  - diuresis with Lasix reduce to 40 milligrams IV once daily because KIAN  - follow up results of TTE .  -nephrology consult appreciated . 82 y/o M with PMHx glaucoma, no cardiac hx, c/o sob on minimal exertion x2 days. EKG showed new onset LBBB, status post cath, PCI to to mid LCX on     12/21, course complicated by development of DVT , suspicion for Pulmonary embolism and KIAN      Principal Diagnoses:  coronary artery disease status post PCI, stent to mid LCX on 12/21  Non ST elevation Myocardial Infarction   atrial fibrillation with RVR  deep venous thrombosis   KIAN( possibly secondary to ATN) on chronic renal disease stage 3  AG metabolic acidosis  new onset CHF, valvular with mildly reduced EF  Acute resp failure secondary to pulmonary edema  Pulmonary HTN   Severe AS        - started on heparin infusion for DVT, possibility of PE( CTA not performed because of KIAN)  -post PCI care in CCU as per cardiology  -continue with BB, statins and DAPT  -Amiodarone discontinued by cardio  -Electrolyte supplementation and follow up with BMP (Monitor Electrolytes Qdaily, Keep K+>4, Mg>2)  - diuresis with Lasix reduce to 40 milligrams IV once daily because KIAN  - follow up results of TTE .  -nephrology consult appreciated .

## 2018-12-23 NOTE — PROGRESS NOTE ADULT - ASSESSMENT
IMPRESSION:  Acute resp failure   most likely secondary to acute pulmonary edema   dvt ?? PE   STEMI  KIAN     PLAN:    CNS: no sedation     HEENT: oral care     PULMONARY: taper fio2 keep pox . 92%     CARDIOVASCULAR: heparin drip follow PTT   follow cardiology regarding Aortic stenosis   consider lasix 40 or 60 mg Q 12 hrs if ok by renal   optimize cardiac work up     GI: GI prophylaxis.  Feeding     RENAL: follow lytes , renal Us follow result renal consult   send urine lytes cr     INFECTIOUS DISEASE: no abx now will follow     HEMATOLOGICAL:  DVT prophylaxis. on heparin drip follow PTT     ENDOCRINE:  Follow up FS.  Insulin protocol if needed.    addendum note :       CRITICAL CARE TIME SPENT: *** IMPRESSION:  Acute resp failure   most likely secondary to acute pulmonary edema   pulmonary HTN   dvt ?? PE   STEMI  KIAN     PLAN:    CNS: no sedation     HEENT: oral care     PULMONARY: taper fio2 keep pox . 92%     CARDIOVASCULAR: heparin drip follow PTT   follow cardiology regarding Aortic stenosis   consider lasix 40 or 60 mg Q 12 hrs if ok by renal   optimize cardiac work up     GI: GI prophylaxis.  Feeding     RENAL: follow lytes , renal Us follow result renal consult   send urine lytes cr     INFECTIOUS DISEASE: no abx now will follow     HEMATOLOGICAL:  DVT prophylaxis. on heparin drip follow PTT     ENDOCRINE:  Follow up FS.  Insulin protocol if needed.    addendum note :       CRITICAL CARE TIME SPENT: *** IMPRESSION:  Acute resp failure   most likely secondary to acute pulmonary edema   pulmonary HTN   dvt ?? PE   STEMI  KIAN   sevre AS    PLAN:    CNS: no sedation     HEENT: oral care     PULMONARY: taper fio2 keep pox . 92%     CARDIOVASCULAR: heparin drip follow PTT   follow cardiology regarding Aortic stenosis   consider lasix 40 or 60 mg Q 12 hrs if ok by renal   optimize cardiac work up     GI: GI prophylaxis.  Feeding     RENAL: follow lytes , renal Us follow result renal consult   send urine lytes cr     INFECTIOUS DISEASE: no abx now will follow     HEMATOLOGICAL:  DVT prophylaxis. on heparin drip follow PTT     ENDOCRINE:  Follow up FS.  Insulin protocol if needed.    addendum note :       CRITICAL CARE TIME SPENT: ***

## 2018-12-23 NOTE — CONSULT NOTE ADULT - SUBJECTIVE AND OBJECTIVE BOX
NEPHROLOGY CONSULTATION NOTE    Patient is a 83y Male whom presented to the hospital with STEMI, developed KIAN.  Pt had a cath 2 days ago, stent in left circ placed. Also found to have mod-severe AS, CHD, started on lasix 40 IV bid. [place] placed, urine 440 cc for 24 hrs.   Creat was 1.1 on 12/20-today is 3.0.  Denies SOb, dizziness.    PAST MEDICAL & SURGICAL HISTORY:  Glaucoma  History of appendectomy    Allergies:  No Known Allergies    Home Medications Reviewed  Hospital Medications:   MEDICATIONS  (STANDING):  aspirin  chewable 81 milliGRAM(s) Oral daily  atorvastatin 40 milliGRAM(s) Oral at bedtime  chlorhexidine 4% Liquid 1 Application(s) Topical <User Schedule>  clopidogrel Tablet 75 milliGRAM(s) Oral daily  furosemide   Injectable 40 milliGRAM(s) IV Push daily  heparin  Infusion 1000 Unit(s)/Hr (10 mL/Hr) IV Continuous <Continuous>  metoprolol succinate ER 50 milliGRAM(s) Oral two times a day  timolol 0.5% Solution 1 Drop(s) Both EYES daily      SOCIAL HISTORY:  Denies ETOH,Smoking,   FAMILY HISTORY:  No significant family history        REVIEW OF SYSTEMS:  CONSTITUTIONAL: No weakness, fevers or chills  EYES/ENT: No visual changes;  No vertigo or throat pain   NECK: No pain or stiffness  RESPIRATORY: No cough, wheezing, hemoptysis; No shortness of breath  CARDIOVASCULAR: No chest pain or palpitations.  GASTROINTESTINAL: No abdominal or epigastric pain. No nausea, vomiting.  GENITOURINARY: No dysuria, frequency  SKIN: No itching, burning, rashes, or lesions   VASCULAR: No bilateral lower extremity edema.   All other review of systems is negative unless indicated above.    VITALS:  T(F): 97.5 (12-23-18 @ 04:00), Max: 98.1 (12-22-18 @ 20:05)  HR: 88 (12-23-18 @ 10:14)  BP: 118/62 (12-23-18 @ 12:00)  RR: 34 (12-23-18 @ 12:00)  SpO2: 97% (12-23-18 @ 12:00)    12-22 @ 07:01  -  12-23 @ 07:00  --------------------------------------------------------  IN: 1238 mL / OUT: 585 mL / NET: 653 mL    12-23 @ 07:01 - 12-23 @ 12:36  --------------------------------------------------------  IN: 770 mL / OUT: 175 mL / NET: 595 mL          12-22-18 @ 07:01  -  12-23-18 @ 07:00  --------------------------------------------------------  IN: 0 mL / OUT: 410 mL / NET: -410 mL    12-23-18 @ 07:01  -  12-23-18 @ 12:36  --------------------------------------------------------  IN: 0 mL / OUT: 175 mL / NET: -175 mL      I&O's Detail    22 Dec 2018 07:01  -  23 Dec 2018 07:00  --------------------------------------------------------  IN:    heparin Infusion: 119 mL    heparin Infusion: 18 mL    heparin Infusion: 81 mL    Oral Fluid: 1020 mL  Total IN: 1238 mL    OUT:    Indwelling Catheter - Urethral: 410 mL    Voided: 175 mL  Total OUT: 585 mL    Total NET: 653 mL      23 Dec 2018 07:01  -  23 Dec 2018 12:36  --------------------------------------------------------  IN:    heparin Infusion: 50 mL    Oral Fluid: 720 mL  Total IN: 770 mL    OUT:    Indwelling Catheter - Urethral: 175 mL  Total OUT: 175 mL    Total NET: 595 mL            PHYSICAL EXAM:  Constitutional: NAD  HEENT: anicteric sclera, oropharynx clear, MMM  Neck: No JVD  Respiratory: CTAB b/l  Cardiovascular: S1, S2, RRR  Gastrointestinal: BS+, soft, NT/ND  Extremities:  No peripheral edema  Neurological: A/O x 3, no focal deficits  Psychiatric: Normal mood, normal affect  : No CVA tenderness.  monaco+.   Skin: No rashes  Vascular Access:    LABS:  12-23    134<L>  |  93<L>  |  82<HH>  ----------------------------<  85  5.0   |  20  |  3.0<H>    Ca    8.8      23 Dec 2018 04:47  Mg     2.2     12-23    TPro  5.8<L>  /  Alb  3.0<L>  /  TBili  1.1  /  DBili      /  AST  187<H>  /  ALT  379<H>  /  AlkPhos  86  12-22    Creatinine Trend: 3.0 <--, 2.7 <--, 2.4 <--, 1.7 <--, 1.8 <--, 1.1 <--, 1.1 <--, 1.2 <--                        12.5   14.16 )-----------( 218      ( 23 Dec 2018 04:47 )             38.2     Urine Studies:    Sodium, Random Urine: 23.0 mmoL/L (12-22 @ 16:39)  Osmolality, Random Urine: 453 mos/kg (12-22 @ 16:39)  Protein/Creatinine Ratio Calculation: 0.2 Ratio (12-22 @ 16:39)  Creatinine, Random Urine: 114 mg/dL (12-22 @ 16:39)            RADIOLOGY & ADDITIONAL STUDIES:    < from: US Retroperitoneal Complete (12.23.18 @ 03:18) >  Normal renal ultrasound.  Collapsed urinary bladder.    < end of copied text >    < from: Xray Chest 1 View- PORTABLE-Urgent (12.22.18 @ 21:23) >    Slightly worse bilateral opacities.    < end of copied text >

## 2018-12-23 NOTE — PROGRESS NOTE ADULT - ASSESSMENT
82 y/o M with PMHx glaucoma, no cardiac hx, c/o sob on minimal exertion x2 days. EKG showed new onset LBBB.    #CODE STEMI-new LBBB, signs of fluid overload (on 3L nc sating 94%, +congestion on cxr, +crackles)  -loaded with asa, plavix, heparin gtt  -Cath with Dr Craig 12/21 coronary artery disease status post PCI, stent to mid LCX on 12/21  -monitor ptt -acs protocol  -CE x 3 0.75 > 0.75 > 0.65  -got one time dose of lasix  -Echo 12/22 showed EF 45%, global cardiomyopathy, severe AS, Estimated pulmonary artery systolic pressure is 101.4 mmHg assuming a right atrial pressure of 15 mmHg, which is consistent with severe pulmonary hypertension.  -12/20 Pt with rate dependent LBBB, had several runs of sustained afib RVR in 150s-160s. Pt asymptomatic, normotensive during episodes. 12 lead EKG showed afib RVR with LBBB. received amio bolus and drip.  -Increased His metoprolol to 50 BID today 12/23 > if HR still >100 consider increasing to 50-25-50 starting 12/24 as per cardio  -Repeat TTE ordered as per cardio      #KIAN due to AKN or ANN  - creat 3.5, was 1.2 on admission  -K 5.4  -Repeat bmp ordered, Nephro on board  -- FeNa appears prerenal (as seen in ANN or heart failure)  - segundo castillo neg for hydro  - cont Linares to monitor Is and Os  - phos ordered  - decreased LAsix to 40 IV qd fro q 12 hrs  - no need for RRT now    #Glaucoma:   - c/w eye drops        DVT PPx: on therapeutic heparin gtt  GI PPx: Not indicated  Activity as tolerated  Diet: regular  Dispo: Home

## 2018-12-23 NOTE — PROGRESS NOTE ADULT - SUBJECTIVE AND OBJECTIVE BOX
Patient is a 83y old Male who presented with a chief complaint of CODE STEMI (23 Dec 2018 14:39)  Currently admitted to medicine with the primary diagnosis of STEMI (ST elevation myocardial infarction)     Today is hospital day 4d. This morning he is resting comfortably in bed and reports no new issues or overnight events.   CCU/ICU day: 04  Intubated: No  Central Line: No  Linares: yes  NG: No  Drips: No  Iv Lines: 1    PAST MEDICAL & SURGICAL HISTORY  Glaucoma  History of appendectomy    SOCIAL HISTORY:  Negative for smoking/alcohol/drug use.     ALLERGIES:  No Known Allergies    MEDICATIONS:  STANDING MEDICATIONS  aspirin  chewable 81 milliGRAM(s) Oral daily  atorvastatin 40 milliGRAM(s) Oral at bedtime  chlorhexidine 4% Liquid 1 Application(s) Topical <User Schedule>  clopidogrel Tablet 75 milliGRAM(s) Oral daily  furosemide   Injectable 40 milliGRAM(s) IV Push daily  heparin  Infusion 1000 Unit(s)/Hr IV Continuous <Continuous>  metoprolol tartrate 50 milliGRAM(s) Oral two times a day  sodium bicarbonate 325 milliGRAM(s) Oral three times a day  timolol 0.5% Solution 1 Drop(s) Both EYES daily    PRN MEDICATIONS    Home Medications:  timolol hemihydrate 0.5% ophthalmic solution: 1 drop(s) to each affected eye 2 times a day (19 Dec 2018 19:04)    VITALS:   T(F): 97.8  HR: 74  BP: 102/67  RR: 26  SpO2: 95%    LABS:                        13.4   16.37 )-----------( 271      ( 23 Dec 2018 17:40 )             39.4     12-23    130<L>  |  90<L>  |  92<HH>  ----------------------------<  115<H>  5.4<H>   |  18  |  3.5<H>    Ca    9.0      23 Dec 2018 17:40  Mg     2.2     12-23    TPro  5.8<L>  /  Alb  3.0<L>  /  TBili  1.1  /  DBili  x   /  AST  187<H>  /  ALT  379<H>  /  AlkPhos  86  12-22    PT/INR - ( 23 Dec 2018 17:30 )   PT: 17.50 sec;   INR: 1.53 ratio         PTT - ( 23 Dec 2018 17:30 )  PTT:59.0 sec    ABG - ( 22 Dec 2018 08:09 )  pH, Arterial: 7.44  pH, Blood: x     /  pCO2: 31    /  pO2: 106   / HCO3: 21    / Base Excess: -2.0  /  SaO2: 98          CARDIAC MARKERS ( 22 Dec 2018 11:46 )  x     / 1.86 ng/mL / 88 U/L / x     / 7.3 ng/mL      RADIOLOGY:  < from: Xray Chest 1 View- PORTABLE-Routine (12.23.18 @ 05:52) >  Impression:    Stable diffuse bilateral interstitial opacities and small left pleural   effusion.      < from: Xray Chest 1 View-PORTABLE IMMEDIATE (12.19.18 @ 20:01) >  Impression:    Mild vascular congestion. No parenchymal opacity pleural effusion or air   leak    < from: Xray Chest 1 View- PORTABLE-Routine (12.20.18 @ 05:16) >  Impression:    Improving opacifications.      PHYSICAL EXAM:  GEN: On 2L NC  LUNGS: Decreased breath sounds BL, BL crackles  HEART: S1/S2 present. new onset LBBB  ABD: Soft, non-tender, non-distended. Bowel sounds present  NEURO: AAOX3

## 2018-12-23 NOTE — PROGRESS NOTE ADULT - SUBJECTIVE AND OBJECTIVE BOX
Patient is a 83y old  Male who presents with a chief complaint of CODE STEMI (22 Dec 2018 10:49)      Over Night Events:  Patient seen and examined. feel better today on NC 4 liters       ROS:  See HPI    PHYSICAL EXAM    ICU Vital Signs Last 24 Hrs  T(C): 36.4 (23 Dec 2018 04:00), Max: 36.7 (22 Dec 2018 20:05)  T(F): 97.5 (23 Dec 2018 04:00), Max: 98.1 (22 Dec 2018 20:05)  HR: 122 (23 Dec 2018 08:09) (78 - 122)  BP: 91/60 (23 Dec 2018 08:09) (91/60 - 128/65)  BP(mean): 73 (23 Dec 2018 08:09) (70 - 89)  ABP: --  ABP(mean): --  RR: 23 (23 Dec 2018 08:09) (21 - 30)  SpO2: 98% (23 Dec 2018 08:09) (94% - 98%)      General: Aox3  HEENT:    [place]            Lymph Nodes: NO cervical LN   Lungs: Bilateral crackles   Cardiovascular: Regular   Abdomen: Soft, Positive BS  Extremities: No clubbing   Skin: warm   Neurological: no focal deficit   Musculoskeletal: move all ext     I&O's Detail    22 Dec 2018 07:01  -  23 Dec 2018 07:00  --------------------------------------------------------  IN:    heparin Infusion: 18 mL    heparin Infusion: 81 mL    heparin Infusion: 109 mL    Oral Fluid: 1020 mL  Total IN: 1228 mL    OUT:    Indwelling Catheter - Urethral: 410 mL    Voided: 175 mL  Total OUT: 585 mL    Total NET: 643 mL          LABS:                          12.5   14.16 )-----------( 218      ( 23 Dec 2018 04:47 )             38.2         23 Dec 2018 04:47    134    |  93     |  82     ----------------------------<  85     5.0     |  20     |  3.0      Ca    8.8        23 Dec 2018 04:47  Mg     2.2       23 Dec 2018 04:47    TPro  5.8    /  Alb  3.0    /  TBili  1.1    /  DBili  x      /  AST  187    /  ALT  379    /  AlkPhos  86     22 Dec 2018 21:08  Amylase x     lipase x                                                 PTT - ( 23 Dec 2018 04:47 )  PTT:60.0 sec                                             CARDIAC MARKERS ( 22 Dec 2018 11:46 )  x     / 1.86 ng/mL / 88 U/L / x     / 7.3 ng/mL                                                                                                                                         ABG - ( 22 Dec 2018 08:09 )  pH, Arterial: 7.44  pH, Blood: x     /  pCO2: 31    /  pO2: 106   / HCO3: 21    / Base Excess: -2.0  /  SaO2: 98                  MEDICATIONS  (STANDING):  aspirin  chewable 81 milliGRAM(s) Oral daily  atorvastatin 40 milliGRAM(s) Oral at bedtime  chlorhexidine 4% Liquid 1 Application(s) Topical <User Schedule>  clopidogrel Tablet 75 milliGRAM(s) Oral daily  furosemide   Injectable 40 milliGRAM(s) IV Push daily  heparin  Infusion 1000 Unit(s)/Hr (10 mL/Hr) IV Continuous <Continuous>  metoprolol tartrate 25 milliGRAM(s) Oral two times a day  timolol 0.5% Solution 1 Drop(s) Both EYES daily    MEDICATIONS  (PRN):          Xrays:  TLC:  OG:  ET tube:                                                                                    decrease b/l effusion    ECHO:

## 2018-12-23 NOTE — PROGRESS NOTE ADULT - SUBJECTIVE AND OBJECTIVE BOX
DANILO LINDSAY MRN-[id number]    Subjective:  Patient was seen and examined at bedside. Reports improvement in  presenting complaint.   No significant overnight events reported.          Vital Signs Last 24 Hrs  T(C): 36.4 (23 Dec 2018 04:00), Max: 36.7 (22 Dec 2018 20:05)  T(F): 97.5 (23 Dec 2018 04:00), Max: 98.1 (22 Dec 2018 20:05)  HR: 88 (23 Dec 2018 10:14) (82 - 122)  BP: 118/62 (23 Dec 2018 12:00) (91/60 - 128/65)  BP(mean): 65 (23 Dec 2018 10:14) (65 - 89)  RR: 34 (23 Dec 2018 12:00) (22 - 34)  SpO2: 97% (23 Dec 2018 12:00) (94% - 98%)    Physical Examination:  Not in acute distress, Oriented X 3  General: NAD, no pallor, or icterus, afebrile  HEENT:  VIANEY, EOMI, no JVD, no Bruit.  Heart: S1+S2 audible, +SM  Lungs: bilateral  fair air entry, no wheezing, no crepitations.  Abdomen: Soft, non-tender, non-distended , no  rigidity or guarding.  CNS: AAOx3, CN  grossly intact, sensation intact.  Extremities:  No edema          ROS:   Patient denies any headache, any vision complaints, runny nose, fever, chills, sore throat. Denies chest pain, shortness of breath, palpitation. Denies nausea, vomiting, abdominal pain, diarrhoea, Denies urinary burning, urgency, frequency, dysuria. Denies weakness in any part of the body or numbness.   At least 10 systems were reviewed in ROS. All systems reviewed  are within normal limits except for the complaints as described in Subjective.    MEDICATIONS  (STANDING):  aspirin  chewable 81 milliGRAM(s) Oral daily  atorvastatin 40 milliGRAM(s) Oral at bedtime  chlorhexidine 4% Liquid 1 Application(s) Topical <User Schedule>  clopidogrel Tablet 75 milliGRAM(s) Oral daily  furosemide   Injectable 40 milliGRAM(s) IV Push daily  heparin  Infusion 1000 Unit(s)/Hr (10 mL/Hr) IV Continuous <Continuous>  metoprolol succinate ER 50 milliGRAM(s) Oral two times a day  timolol 0.5% Solution 1 Drop(s) Both EYES daily    MEDICATIONS  (PRN):                            12.5   14.16 )-----------( 218      ( 23 Dec 2018 04:47 )             38.2     12-23    134<L>  |  93<L>  |  82<HH>  ----------------------------<  85  5.0   |  20  |  3.0<H>    Ca    8.8      23 Dec 2018 04:47  Mg     2.2     12-23    TPro  5.8<L>  /  Alb  3.0<L>  /  TBili  1.1  /  DBili  x   /  AST  187<H>  /  ALT  379<H>  /  AlkPhos  86  12-22

## 2018-12-24 LAB
ANION GAP SERPL CALC-SCNC: 20 MMOL/L — HIGH (ref 7–14)
ANION GAP SERPL CALC-SCNC: 22 MMOL/L — HIGH (ref 7–14)
APTT BLD: 55.6 SEC — HIGH (ref 27–39.2)
APTT BLD: 60.3 SEC — HIGH (ref 27–39.2)
APTT BLD: 62.6 SEC — HIGH (ref 27–39.2)
BASOPHILS # BLD AUTO: 0.02 K/UL — SIGNIFICANT CHANGE UP (ref 0–0.2)
BASOPHILS # BLD AUTO: 0.02 K/UL — SIGNIFICANT CHANGE UP (ref 0–0.2)
BASOPHILS NFR BLD AUTO: 0.2 % — SIGNIFICANT CHANGE UP (ref 0–1)
BASOPHILS NFR BLD AUTO: 0.2 % — SIGNIFICANT CHANGE UP (ref 0–1)
BUN SERPL-MCNC: 93 MG/DL — CRITICAL HIGH (ref 10–20)
BUN SERPL-MCNC: 93 MG/DL — CRITICAL HIGH (ref 10–20)
CALCIUM SERPL-MCNC: 8.5 MG/DL — SIGNIFICANT CHANGE UP (ref 8.5–10.1)
CALCIUM SERPL-MCNC: 8.7 MG/DL — SIGNIFICANT CHANGE UP (ref 8.5–10.1)
CHLORIDE SERPL-SCNC: 90 MMOL/L — LOW (ref 98–110)
CHLORIDE SERPL-SCNC: 91 MMOL/L — LOW (ref 98–110)
CO2 SERPL-SCNC: 18 MMOL/L — SIGNIFICANT CHANGE UP (ref 17–32)
CO2 SERPL-SCNC: 20 MMOL/L — SIGNIFICANT CHANGE UP (ref 17–32)
CREAT SERPL-MCNC: 3.4 MG/DL — HIGH (ref 0.7–1.5)
CREAT SERPL-MCNC: 3.6 MG/DL — HIGH (ref 0.7–1.5)
EOSINOPHIL # BLD AUTO: 0.03 K/UL — SIGNIFICANT CHANGE UP (ref 0–0.7)
EOSINOPHIL # BLD AUTO: 0.06 K/UL — SIGNIFICANT CHANGE UP (ref 0–0.7)
EOSINOPHIL NFR BLD AUTO: 0.3 % — SIGNIFICANT CHANGE UP (ref 0–8)
EOSINOPHIL NFR BLD AUTO: 0.5 % — SIGNIFICANT CHANGE UP (ref 0–8)
GLUCOSE SERPL-MCNC: 152 MG/DL — HIGH (ref 70–99)
GLUCOSE SERPL-MCNC: 88 MG/DL — SIGNIFICANT CHANGE UP (ref 70–99)
HCT VFR BLD CALC: 35.1 % — LOW (ref 42–52)
HCT VFR BLD CALC: 35.1 % — LOW (ref 42–52)
HGB BLD-MCNC: 11.7 G/DL — LOW (ref 14–18)
HGB BLD-MCNC: 11.8 G/DL — LOW (ref 14–18)
IMM GRANULOCYTES NFR BLD AUTO: 1 % — HIGH (ref 0.1–0.3)
IMM GRANULOCYTES NFR BLD AUTO: 1 % — HIGH (ref 0.1–0.3)
INR BLD: 1.43 RATIO — HIGH (ref 0.65–1.3)
INR BLD: 1.46 RATIO — HIGH (ref 0.65–1.3)
LYMPHOCYTES # BLD AUTO: 1.05 K/UL — LOW (ref 1.2–3.4)
LYMPHOCYTES # BLD AUTO: 1.77 K/UL — SIGNIFICANT CHANGE UP (ref 1.2–3.4)
LYMPHOCYTES # BLD AUTO: 14.2 % — LOW (ref 20.5–51.1)
LYMPHOCYTES # BLD AUTO: 9.7 % — LOW (ref 20.5–51.1)
MAGNESIUM SERPL-MCNC: 2.3 MG/DL — SIGNIFICANT CHANGE UP (ref 1.8–2.4)
MCHC RBC-ENTMCNC: 27.9 PG — SIGNIFICANT CHANGE UP (ref 27–31)
MCHC RBC-ENTMCNC: 28.1 PG — SIGNIFICANT CHANGE UP (ref 27–31)
MCHC RBC-ENTMCNC: 33.3 G/DL — SIGNIFICANT CHANGE UP (ref 32–37)
MCHC RBC-ENTMCNC: 33.6 G/DL — SIGNIFICANT CHANGE UP (ref 32–37)
MCV RBC AUTO: 83.6 FL — SIGNIFICANT CHANGE UP (ref 80–94)
MCV RBC AUTO: 83.8 FL — SIGNIFICANT CHANGE UP (ref 80–94)
MONOCYTES # BLD AUTO: 0.95 K/UL — HIGH (ref 0.1–0.6)
MONOCYTES # BLD AUTO: 1.41 K/UL — HIGH (ref 0.1–0.6)
MONOCYTES NFR BLD AUTO: 11.3 % — HIGH (ref 1.7–9.3)
MONOCYTES NFR BLD AUTO: 8.8 % — SIGNIFICANT CHANGE UP (ref 1.7–9.3)
NEUTROPHILS # BLD AUTO: 8.62 K/UL — HIGH (ref 1.4–6.5)
NEUTROPHILS # BLD AUTO: 9.06 K/UL — HIGH (ref 1.4–6.5)
NEUTROPHILS NFR BLD AUTO: 72.8 % — SIGNIFICANT CHANGE UP (ref 42.2–75.2)
NEUTROPHILS NFR BLD AUTO: 80 % — HIGH (ref 42.2–75.2)
NRBC # BLD: 0 /100 WBCS — SIGNIFICANT CHANGE UP (ref 0–0)
NRBC # BLD: 0 /100 WBCS — SIGNIFICANT CHANGE UP (ref 0–0)
PLATELET # BLD AUTO: 235 K/UL — SIGNIFICANT CHANGE UP (ref 130–400)
PLATELET # BLD AUTO: 246 K/UL — SIGNIFICANT CHANGE UP (ref 130–400)
POTASSIUM SERPL-MCNC: 4.4 MMOL/L — SIGNIFICANT CHANGE UP (ref 3.5–5)
POTASSIUM SERPL-MCNC: 4.6 MMOL/L — SIGNIFICANT CHANGE UP (ref 3.5–5)
POTASSIUM SERPL-SCNC: 4.4 MMOL/L — SIGNIFICANT CHANGE UP (ref 3.5–5)
POTASSIUM SERPL-SCNC: 4.6 MMOL/L — SIGNIFICANT CHANGE UP (ref 3.5–5)
PROTHROM AB SERPL-ACNC: 16.4 SEC — HIGH (ref 9.95–12.87)
PROTHROM AB SERPL-ACNC: 16.7 SEC — HIGH (ref 9.95–12.87)
RBC # BLD: 4.19 M/UL — LOW (ref 4.7–6.1)
RBC # BLD: 4.2 M/UL — LOW (ref 4.7–6.1)
RBC # FLD: 13.9 % — SIGNIFICANT CHANGE UP (ref 11.5–14.5)
RBC # FLD: 13.9 % — SIGNIFICANT CHANGE UP (ref 11.5–14.5)
SODIUM SERPL-SCNC: 130 MMOL/L — LOW (ref 135–146)
SODIUM SERPL-SCNC: 131 MMOL/L — LOW (ref 135–146)
WBC # BLD: 10.78 K/UL — SIGNIFICANT CHANGE UP (ref 4.8–10.8)
WBC # BLD: 12.44 K/UL — HIGH (ref 4.8–10.8)
WBC # FLD AUTO: 10.78 K/UL — SIGNIFICANT CHANGE UP (ref 4.8–10.8)
WBC # FLD AUTO: 12.44 K/UL — HIGH (ref 4.8–10.8)

## 2018-12-24 RX ORDER — AMIODARONE HYDROCHLORIDE 400 MG/1
0.5 TABLET ORAL
Qty: 900 | Refills: 0 | Status: DISCONTINUED | OUTPATIENT
Start: 2018-12-24 | End: 2018-12-24

## 2018-12-24 RX ORDER — METOPROLOL TARTRATE 50 MG
5 TABLET ORAL ONCE
Qty: 0 | Refills: 0 | Status: COMPLETED | OUTPATIENT
Start: 2018-12-24 | End: 2018-12-24

## 2018-12-24 RX ORDER — AMIODARONE HYDROCHLORIDE 400 MG/1
1 TABLET ORAL
Qty: 900 | Refills: 0 | Status: DISCONTINUED | OUTPATIENT
Start: 2018-12-24 | End: 2018-12-24

## 2018-12-24 RX ADMIN — Medication 325 MILLIGRAM(S): at 13:46

## 2018-12-24 RX ADMIN — CHLORHEXIDINE GLUCONATE 1 APPLICATION(S): 213 SOLUTION TOPICAL at 05:39

## 2018-12-24 RX ADMIN — Medication 40 MILLIGRAM(S): at 05:34

## 2018-12-24 RX ADMIN — Medication 1 DROP(S): at 11:20

## 2018-12-24 RX ADMIN — Medication 325 MILLIGRAM(S): at 05:35

## 2018-12-24 RX ADMIN — Medication 50 MILLIGRAM(S): at 18:13

## 2018-12-24 RX ADMIN — Medication 50 MILLIGRAM(S): at 05:33

## 2018-12-24 RX ADMIN — Medication 81 MILLIGRAM(S): at 11:19

## 2018-12-24 RX ADMIN — Medication 325 MILLIGRAM(S): at 21:43

## 2018-12-24 RX ADMIN — ATORVASTATIN CALCIUM 40 MILLIGRAM(S): 80 TABLET, FILM COATED ORAL at 21:43

## 2018-12-24 RX ADMIN — Medication 5 MILLIGRAM(S): at 19:38

## 2018-12-24 RX ADMIN — CLOPIDOGREL BISULFATE 75 MILLIGRAM(S): 75 TABLET, FILM COATED ORAL at 11:18

## 2018-12-24 NOTE — PROGRESS NOTE ADULT - ASSESSMENT
82 y/o M with PMHx glaucoma, no cardiac hx, c/o sob on minimal exertion x2 days. EKG showed new onset LBBB.    #CODE STEMI-new LBBB, signs of fluid overload (on 3L nc sating 94%, +congestion on cxr, +crackles)  -Cath with Dr Craig 12/21 coronary artery disease status post PCI, stent to mid LCX on 12/21  -cw asa, plavix, heparin gtt  -monitor ptt -acs protocol  -CE x 3 0.75 > 0.75 > 0.65  -on Lasix 40 Q24  -Echo 12/22 showed EF 45%, global cardiomyopathy, severe AS, Estimated pulmonary artery systolic pressure is 101.4 mmHg assuming a right atrial pressure of 15 mmHg, which is consistent with severe pulmonary hypertension.  -12/20 Pt with rate dependent LBBB, had several runs of sustained afib RVR in 150s-160s. Pt was asymptomatic, normotensive during episodes. 12 lead EKG showed afib RVR with LBBB. received amio bolus and drip.  -Increased His metoprolol to 50 BID today 12/23 > if HR still >100 consider increasing to 50-25-50 starting 12/24 as per cardio  -Repeat TTE ordered as per cardio      #KIAN due to AKN or ANN  -creat 3.5, was 1.2 on admission  -K 5.4  -Repeat bmp ordered, Nephro on board  - FeNa appears prerenal (as seen in ANN or heart failure)  - kidney sono neg for hydro 12/23  - cont Linares to monitor Is and Os  - phos 5.5 12/23  - decreased LAsix to 40 IV qd fro q 12 hrs  - no need for RRT now    #Glaucoma:   - c/w eye drops        DVT PPx: on therapeutic heparin gtt  GI PPx: Not indicated  Activity as tolerated  Diet: regular  Dispo: Home

## 2018-12-24 NOTE — PROGRESS NOTE ADULT - ASSESSMENT
83/M with KIAN following STEMi, angioplasty, new Afib with RVR, CHF.    KIAN due to AKN or ANN  - FeNa appears prerenal   - kideny sono neg for hydro  - cont Linares to monitor Is and Os, 1.3 liters overnight  - creat plateau 3.6<-3.6 <-1.1  four days ago  -  phos noted  - cont LAsix 40 IV qd - CXR still with congestion  - no need for RRT now    AG Met acidosis - due to KIAN  - cont  NA bicarb 325 po tid    Afib with RVR - on betablocker and IV heparin  Avoid hypotension  Will follow

## 2018-12-24 NOTE — PROGRESS NOTE ADULT - SUBJECTIVE AND OBJECTIVE BOX
Patient is a 83y old  Male who presents with a chief complaint of CODE STEMI (24 Dec 2018 06:51)      Over Night Events:  Patient seen and examined  feel better cxr improved       ROS:  See HPI    PHYSICAL EXAM    ICU Vital Signs Last 24 Hrs  T(C): 36 (24 Dec 2018 04:00), Max: 36.6 (23 Dec 2018 20:00)  T(F): 96.8 (24 Dec 2018 04:00), Max: 97.8 (23 Dec 2018 20:00)  HR: 74 (24 Dec 2018 06:00) (72 - 122)  BP: 109/68 (24 Dec 2018 06:00) (91/60 - 118/62)  BP(mean): 79 (24 Dec 2018 06:00) (65 - 79)  ABP: --  ABP(mean): --  RR: 25 (24 Dec 2018 06:00) (21 - 39)  SpO2: 92% (24 Dec 2018 06:00) (92% - 99%)      General: Aox3  HEENT:       [place]         Lymph Nodes: NO cervical LN   Lungs: Bilateral BS  Cardiovascular: Regular   Abdomen: Soft, Positive BS  Extremities: No clubbing   Skin: warm   Neurological: no focal deficit   Musculoskeletal: move all ext     I&O's Detail    23 Dec 2018 07:01  -  24 Dec 2018 07:00  --------------------------------------------------------  IN:    heparin Infusion: 240 mL    Oral Fluid: 1810 mL  Total IN: 2050 mL    OUT:    Indwelling Catheter - Urethral: 1340 mL  Total OUT: 1340 mL    Total NET: 710 mL          LABS:                          11.7   12.44 )-----------( 235      ( 24 Dec 2018 04:21 )             35.1         24 Dec 2018 04:21    130    |  90     |  93     ----------------------------<  88     4.6     |  18     |  3.6      Ca    8.7        24 Dec 2018 04:21  Phos  5.5       23 Dec 2018 20:28  Mg     2.3       24 Dec 2018 04:21                                               PT/INR - ( 23 Dec 2018 17:30 )   PT: 17.50 sec;   INR: 1.53 ratio         PTT - ( 24 Dec 2018 00:18 )  PTT:62.6 sec                                       Urinalysis Basic - ( 23 Dec 2018 22:20 )    Color: Red / Appearance: Turbid / S.025 / pH: x  Gluc: x / Ketone: Negative  / Bili: Small / Urobili: 1.0 mg/dL   Blood: x / Protein: 30 mg/dL / Nitrite: Negative   Leuk Esterase: Moderate / RBC: >50 /HPF / WBC 26-50 /HPF   Sq Epi: x / Non Sq Epi: Few /HPF / Bacteria: Many /HPF          CARDIAC MARKERS ( 22 Dec 2018 11:46 )  x     / 1.86 ng/mL / 88 U/L / x     / 7.3 ng/mL                                                                                                                                         ABG - ( 22 Dec 2018 08:09 )  pH, Arterial: 7.44  pH, Blood: x     /  pCO2: 31    /  pO2: 106   / HCO3: 21    / Base Excess: -2.0  /  SaO2: 98                  MEDICATIONS  (STANDING):  aspirin  chewable 81 milliGRAM(s) Oral daily  atorvastatin 40 milliGRAM(s) Oral at bedtime  chlorhexidine 4% Liquid 1 Application(s) Topical <User Schedule>  clopidogrel Tablet 75 milliGRAM(s) Oral daily  furosemide   Injectable 40 milliGRAM(s) IV Push daily  heparin  Infusion 1000 Unit(s)/Hr (10 mL/Hr) IV Continuous <Continuous>  metoprolol tartrate 50 milliGRAM(s) Oral two times a day  sodium bicarbonate 325 milliGRAM(s) Oral three times a day  timolol 0.5% Solution 1 Drop(s) Both EYES daily    MEDICATIONS  (PRN):          Xrays:  TLC:  OG:  ET tube:                                                                                    decrease b/l opacity and effusion    ECHO:

## 2018-12-24 NOTE — PROGRESS NOTE ADULT - SUBJECTIVE AND OBJECTIVE BOX
SUBJECTIVE:    Patient is a 83y old Male who presents with a chief complaint of CODE STEMI (23 Dec 2018 20:23)    Currently admitted to medicine with the primary diagnosis of STEMI (ST elevation myocardial infarction)     Today is hospital day 5d. This morning he is resting comfortably in bed and reports no new issues or overnight events.   CCU/ICU day  Intubated:  Central Line:  Linares:  NG:  Drips:  Iv Lines:    PAST MEDICAL & SURGICAL HISTORY  Glaucoma  History of appendectomy    SOCIAL HISTORY:  Negative for smoking/alcohol/drug use.     ALLERGIES:  No Known Allergies    MEDICATIONS:  STANDING MEDICATIONS  aspirin  chewable 81 milliGRAM(s) Oral daily  atorvastatin 40 milliGRAM(s) Oral at bedtime  chlorhexidine 4% Liquid 1 Application(s) Topical <User Schedule>  clopidogrel Tablet 75 milliGRAM(s) Oral daily  furosemide   Injectable 40 milliGRAM(s) IV Push daily  heparin  Infusion 1000 Unit(s)/Hr IV Continuous <Continuous>  metoprolol tartrate 50 milliGRAM(s) Oral two times a day  sodium bicarbonate 325 milliGRAM(s) Oral three times a day  timolol 0.5% Solution 1 Drop(s) Both EYES daily    PRN MEDICATIONS    Home Medications:  timolol hemihydrate 0.5% ophthalmic solution: 1 drop(s) to each affected eye 2 times a day (19 Dec 2018 19:04)    VITALS:   T(F): 96.8  HR: 74  BP: 109/68  RR: 25  SpO2: 92%    LABS:                        11.7   12.44 )-----------( 235      ( 24 Dec 2018 04:21 )             35.1     12-24    130<L>  |  90<L>  |  93<HH>  ----------------------------<  88  4.6   |  18  |  3.6<H>    Ca    8.7      24 Dec 2018 04:21  Phos  5.5     12-23  Mg     2.3     12-24    TPro  5.8<L>  /  Alb  3.0<L>  /  TBili  1.1  /  DBili  x   /  AST  187<H>  /  ALT  379<H>  /  AlkPhos  86  12-22    PT/INR - ( 23 Dec 2018 17:30 )   PT: 17.50 sec;   INR: 1.53 ratio         PTT - ( 24 Dec 2018 00:18 )  PTT:62.6 sec  Urinalysis Basic - ( 23 Dec 2018 22:20 )    Color: Red / Appearance: Turbid / S.025 / pH: x  Gluc: x / Ketone: Negative  / Bili: Small / Urobili: 1.0 mg/dL   Blood: x / Protein: 30 mg/dL / Nitrite: Negative   Leuk Esterase: Moderate / RBC: >50 /HPF / WBC 26-50 /HPF   Sq Epi: x / Non Sq Epi: Few /HPF / Bacteria: Many /HPF      ABG - ( 22 Dec 2018 08:09 )  pH, Arterial: 7.44  pH, Blood: x     /  pCO2: 31    /  pO2: 106   / HCO3: 21    / Base Excess: -2.0  /  SaO2: 98                    CARDIAC MARKERS ( 22 Dec 2018 11:46 )  x     / 1.86 ng/mL / 88 U/L / x     / 7.3 ng/mL      RADIOLOGY:    PHYSICAL EXAM:  GEN: No acute distress  LUNGS: Clear to auscultation bilaterally   HEART: S1/S2 present. RRR.   ABD: Soft, non-tender, non-distended. Bowel sounds present  EXT: NC/NC/NE/2+PP/THOMAS/Skin Intact.   NEURO: AAOX3 Patient is a 83y old Male who presents with a chief complaint of CODE STEMI (23 Dec 2018 20:23)  Currently admitted to medicine with the primary diagnosis of STEMI (ST elevation myocardial infarction)     Today is hospital day 5d. This morning he is resting comfortably in bed and reports no new issues or overnight events.   CCU/ICU day: 05  Intubated: No  Central Line: No  Linares: No  NG: No  Drips: Heparin  Iv Lines: 1    PAST MEDICAL & SURGICAL HISTORY  Glaucoma  History of appendectomy    SOCIAL HISTORY:  Negative for smoking/alcohol/drug use.     ALLERGIES:  No Known Allergies    MEDICATIONS:  STANDING MEDICATIONS  aspirin  chewable 81 milliGRAM(s) Oral daily  atorvastatin 40 milliGRAM(s) Oral at bedtime  chlorhexidine 4% Liquid 1 Application(s) Topical <User Schedule>  clopidogrel Tablet 75 milliGRAM(s) Oral daily  furosemide   Injectable 40 milliGRAM(s) IV Push daily  heparin  Infusion 1000 Unit(s)/Hr IV Continuous <Continuous>  metoprolol tartrate 50 milliGRAM(s) Oral two times a day  sodium bicarbonate 325 milliGRAM(s) Oral three times a day  timolol 0.5% Solution 1 Drop(s) Both EYES daily    PRN MEDICATIONS    Home Medications:  timolol hemihydrate 0.5% ophthalmic solution: 1 drop(s) to each affected eye 2 times a day (19 Dec 2018 19:04)    VITALS:   T(F): 96.8  HR: 74  BP: 109/68  RR: 25  SpO2: 92%    LABS:                        11.7   12.44 )-----------( 235      ( 24 Dec 2018 04:21 )             35.1     12-24    130<L>  |  90<L>  |  93<HH>  ----------------------------<  88  4.6   |  18  |  3.6<H>    Ca    8.7      24 Dec 2018 04:21  Phos  5.5     12-23  Mg     2.3     12-24    TPro  5.8<L>  /  Alb  3.0<L>  /  TBili  1.1  /  DBili  x   /  AST  187<H>  /  ALT  379<H>  /  AlkPhos  86  12-22    PT/INR - ( 23 Dec 2018 17:30 )   PT: 17.50 sec;   INR: 1.53 ratio         PTT - ( 24 Dec 2018 00:18 )  PTT:62.6 sec  Urinalysis Basic - ( 23 Dec 2018 22:20 )    Color: Red / Appearance: Turbid / S.025 / pH: x  Gluc: x / Ketone: Negative  / Bili: Small / Urobili: 1.0 mg/dL   Blood: x / Protein: 30 mg/dL / Nitrite: Negative   Leuk Esterase: Moderate / RBC: >50 /HPF / WBC 26-50 /HPF   Sq Epi: x / Non Sq Epi: Few /HPF / Bacteria: Many /HPF      ABG - ( 22 Dec 2018 08:09 )  pH, Arterial: 7.44  pH, Blood: x     /  pCO2: 31    /  pO2: 106   / HCO3: 21    / Base Excess: -2.0  /  SaO2: 98          CARDIAC MARKERS ( 22 Dec 2018 11:46 )  x     / 1.86 ng/mL / 88 U/L / x     / 7.3 ng/mL      RADIOLOGY:  < from: Xray Chest 1 View- PORTABLE-Routine (18 @ 05:52) >  Impression:    Stable diffuse bilateral interstitial opacities and small left pleural   effusion.      < from: Xray Chest 1 View-PORTABLE IMMEDIATE (18 @ 20:01) >  Impression:    Mild vascular congestion. No parenchymal opacity pleural effusion or air   leak    < from: Xray Chest 1 View- PORTABLE-Routine (18 @ 05:16) >  Impression:    Improving opacifications.      PHYSICAL EXAM:  GEN: No acute distress  LUNGS: BL crackles, decreased breath sounds  HEART: S1/S2 present. RRR.   ABD: Soft, non-tender, non-distended. Bowel sounds present  NEURO: AAOX3 Patient is a 83y old Male who presents with a chief complaint of CODE STEMI (23 Dec 2018 20:23)  Currently admitted to medicine with the primary diagnosis of STEMI (ST elevation myocardial infarction)     Today is hospital day 5d. This morning he is resting comfortably in bed and reports no new issues or overnight events.   CCU/ICU day: 05  Intubated: No  Central Line: No  Linares: yes  NG: No  Drips: Heparin  Iv Lines: 1    PAST MEDICAL & SURGICAL HISTORY  Glaucoma  History of appendectomy    SOCIAL HISTORY:  Negative for smoking/alcohol/drug use.     ALLERGIES:  No Known Allergies    MEDICATIONS:  STANDING MEDICATIONS  aspirin  chewable 81 milliGRAM(s) Oral daily  atorvastatin 40 milliGRAM(s) Oral at bedtime  chlorhexidine 4% Liquid 1 Application(s) Topical <User Schedule>  clopidogrel Tablet 75 milliGRAM(s) Oral daily  furosemide   Injectable 40 milliGRAM(s) IV Push daily  heparin  Infusion 1000 Unit(s)/Hr IV Continuous <Continuous>  metoprolol tartrate 50 milliGRAM(s) Oral two times a day  sodium bicarbonate 325 milliGRAM(s) Oral three times a day  timolol 0.5% Solution 1 Drop(s) Both EYES daily    PRN MEDICATIONS    Home Medications:  timolol hemihydrate 0.5% ophthalmic solution: 1 drop(s) to each affected eye 2 times a day (19 Dec 2018 19:04)    VITALS:   T(F): 96.8  HR: 74  BP: 109/68  RR: 25  SpO2: 92%    LABS:                        11.7   12.44 )-----------( 235      ( 24 Dec 2018 04:21 )             35.1     12-24    130<L>  |  90<L>  |  93<HH>  ----------------------------<  88  4.6   |  18  |  3.6<H>    Ca    8.7      24 Dec 2018 04:21  Phos  5.5     12-23  Mg     2.3     12-24    TPro  5.8<L>  /  Alb  3.0<L>  /  TBili  1.1  /  DBili  x   /  AST  187<H>  /  ALT  379<H>  /  AlkPhos  86  12-22    PT/INR - ( 23 Dec 2018 17:30 )   PT: 17.50 sec;   INR: 1.53 ratio         PTT - ( 24 Dec 2018 00:18 )  PTT:62.6 sec  Urinalysis Basic - ( 23 Dec 2018 22:20 )    Color: Red / Appearance: Turbid / S.025 / pH: x  Gluc: x / Ketone: Negative  / Bili: Small / Urobili: 1.0 mg/dL   Blood: x / Protein: 30 mg/dL / Nitrite: Negative   Leuk Esterase: Moderate / RBC: >50 /HPF / WBC 26-50 /HPF   Sq Epi: x / Non Sq Epi: Few /HPF / Bacteria: Many /HPF      ABG - ( 22 Dec 2018 08:09 )  pH, Arterial: 7.44  pH, Blood: x     /  pCO2: 31    /  pO2: 106   / HCO3: 21    / Base Excess: -2.0  /  SaO2: 98          CARDIAC MARKERS ( 22 Dec 2018 11:46 )  x     / 1.86 ng/mL / 88 U/L / x     / 7.3 ng/mL      RADIOLOGY:  < from: Xray Chest 1 View- PORTABLE-Routine (18 @ 05:52) >  Impression:    Stable diffuse bilateral interstitial opacities and small left pleural   effusion.      < from: Xray Chest 1 View-PORTABLE IMMEDIATE (18 @ 20:01) >  Impression:    Mild vascular congestion. No parenchymal opacity pleural effusion or air   leak    < from: Xray Chest 1 View- PORTABLE-Routine (18 @ 05:16) >  Impression:    Improving opacifications.      PHYSICAL EXAM:  GEN: No acute distress  LUNGS: BL crackles, decreased breath sounds  HEART: S1/S2 present. RRR.   ABD: Soft, non-tender, non-distended. Bowel sounds present  NEURO: AAOX3

## 2018-12-24 NOTE — PROGRESS NOTE ADULT - SUBJECTIVE AND OBJECTIVE BOX
Nephrology progress note    Patient is seen and examined, events over the last 24 h noted .  Pt seen for KIAN, STEMI, s/p PCI  Allergies:  No Known Allergies    Hospital Medications:   MEDICATIONS  (STANDING):  aspirin  chewable 81 milliGRAM(s) Oral daily  atorvastatin 40 milliGRAM(s) Oral at bedtime  chlorhexidine 4% Liquid 1 Application(s) Topical <User Schedule>  clopidogrel Tablet 75 milliGRAM(s) Oral daily  furosemide   Injectable 40 milliGRAM(s) IV Push daily  heparin  Infusion 1000 Unit(s)/Hr (10 mL/Hr) IV Continuous <Continuous>  metoprolol tartrate 50 milliGRAM(s) Oral two times a day  sodium bicarbonate 325 milliGRAM(s) Oral three times a day  timolol 0.5% Solution 1 Drop(s) Both EYES daily        VITALS:  T(F): 96.8 (18 @ 04:00), Max: 97.8 (18 @ 20:00)  HR: 74 (18 @ 06:00)  BP: 109/68 (18 @ 06:00)  RR: 25 (18 @ 06:00)  SpO2: 92% (18 @ 06:00)  Wt(kg): --     @ 07:01  -   @ 07:00  --------------------------------------------------------  IN: 1238 mL / OUT: 585 mL / NET: 653 mL     @ 07:01  -   @ 07:00  --------------------------------------------------------  IN: 2050 mL / OUT: 1340 mL / NET: 710 mL          PHYSICAL EXAM:  Constitutional: NAD  HEENT: anicteric sclera, oropharynx clear, MMM  Neck: No JVD  Respiratory: CTAB, no wheezes, rales or rhonchi  Cardiovascular: S1, S2, RRR  Gastrointestinal: BS+, soft, NT/ND  Extremities: No cyanosis or clubbing. No peripheral edema  Neurological: A/O x 3, no focal deficits  : No CVA tenderness monaco+.   Skin: No rashes  Vascular Access:    LABS:      130<L>  |  90<L>  |  93<HH>  ----------------------------<  88  4.6   |  18  |  3.6<H>  Creatinine Trend: 3.6<--, 3.6<--, 3.5<--, 3.0<--, 2.7<--, 2.4<--    Ca    8.7      24 Dec 2018 04:21  Phos  5.5       Mg     2.3         TPro  5.8<L>  /  Alb  3.0<L>  /  TBili  1.1  /  DBili      /  AST  187<H>  /  ALT  379<H>  /  AlkPhos  86                            11.7   12.44 )-----------( 235      ( 24 Dec 2018 04:21 )             35.1       Urine Studies:  Urinalysis Basic - ( 23 Dec 2018 22:20 )    Color: Red / Appearance: Turbid / S.025 / pH:   Gluc:  / Ketone: Negative  / Bili: Small / Urobili: 1.0 mg/dL   Blood:  / Protein: 30 mg/dL / Nitrite: Negative   Leuk Esterase: Moderate / RBC: >50 /HPF / WBC 26-50 /HPF   Sq Epi:  / Non Sq Epi: Few /HPF / Bacteria: Many /HPF      Sodium, Random Urine: 23.0 mmoL/L ( @ 16:39)  Osmolality, Random Urine: 453 mos/kg ( @ 16:39)  Protein/Creatinine Ratio Calculation: 0.2 Ratio ( @ 16:39)  Creatinine, Random Urine: 114 mg/dL ( @ 16:39)    RADIOLOGY & ADDITIONAL STUDIES:  < from: Xray Chest 1 View- PORTABLE-Routine (18 @ 07:32) >  Minimal improvement in bilateral perihilar opacities. Trace left pleural   effusion. No pneumothorax    < end of copied text >

## 2018-12-24 NOTE — PROGRESS NOTE ADULT - SUBJECTIVE AND OBJECTIVE BOX
Cardiology Follow up    DANILO LINDSAY   83yMale  PAST MEDICAL & SURGICAL HISTORY:  Glaucoma  History of appendectomy    Allergies    No Known Allergies    Intolerances        Patient without complaints.   Denies CP, SOB, palpitations, or dizziness      Vital Signs Last 24 Hrs  T(C): 36 (24 Dec 2018 04:00), Max: 36.6 (23 Dec 2018 20:00)  T(F): 96.8 (24 Dec 2018 04:00), Max: 97.8 (23 Dec 2018 20:00)  HR: 74 (24 Dec 2018 06:00) (72 - 88)  BP: 109/68 (24 Dec 2018 06:00) (97/54 - 118/62)  BP(mean): 79 (24 Dec 2018 06:00) (65 - 79)  RR: 25 (24 Dec 2018 06:00) (21 - 39)  SpO2: 92% (24 Dec 2018 06:00) (92% - 99%)Allergies    REVIEW OF SYSTEMS:    CONSTITUTIONAL: No weakness, fevers or chills  EYES/ENT: No visual changes;  No vertigo or throat pain   NECK: No pain or stiffness  RESPIRATORY: No cough, wheezing, hemoptysis; No shortness of breath  CARDIOVASCULAR: No chest pain or palpitations  GASTROINTESTINAL: No abdominal or epigastric pain. No nausea, vomiting, or hematemesis; No diarrhea or constipation. No melena or hematochezia.  GENITOURINARY: No dysuria, frequency or hematuria  NEUROLOGICAL: No numbness or weakness  SKIN: No itching, rashes          NAD, appears well  S1S2, + murmurs, no JVD  CTA B/L, no wheeze, no rales  SNT +BS  Ext:    Right Groin:  NO hematoma or bleeding   NO bruit, C/D/I , + pulses  Pulses:  +Rad/ +PTs /+DPs/ same as baseline  A&Ox 3    EKG        Ventricular Rate 74 BPM    Atrial Rate 74 BPM    P-R Interval 194 ms    QRS Duration 110 ms    Q-T Interval 428 ms    QTC Calculation(Bezet) 475 ms    P Axis 56 degrees    R Axis 55 degrees    T Axis 21 degrees    Diagnosis Line Normal sinus rhythm  Possible Left atrial enlargement  Cannot rule out Anterior infarct , age undetermined  Abnormal ECG    Confirmed by Poonam Woods MD [id number]) on 12/24/2018 9:24:39 AM                                                                                                               2D ECHO  EXAM:  2-D ECHO (TTE) COMPLETE        PROCEDURE DATE:  12/22/2018      INTERPRETATION:  REPORT:    TRANSTHORACIC ECHOCARDIOGRAM REPORT         Patient Name:   DANILO LINDSAY Accession #: [id number]  Medical Rec #:  [id number]        Height:      68.0 in 172.7 cm  YOB: 1935       Weight:      178.0 lb 80.74 kg  Patient Age:    83 years         BSA:         1.95 m²  Patient Gender: M                BP:          111/59 mmHg       Date of Exam:        12/22/2018 10:47:43 AM  Referring Physician: [id number] ED UNASSIGNED  Sonographer:         Sterling holliday  Reading Physician:   Latesha Murphy MD.    Procedure:     2D Echo/Doppler/Color Doppler Complete.  Indications:   R07.9 - Chest Pain, unspecified  Diagnosis:     R07.9- Chest Pain, unspecified  Study Details: Technically fair study.         Summary:   1. Left ventricular ejection fraction, by visual estimation, is 45 to   50%.   2. Mildly decreased global left ventricular systolic function.   3. Global cardiomyopathy.   4. Mild tricuspid regurgitation.   5. Mild aortic regurgitation.   6. Moderate to severe aortic stenosis with a peak AV velocity of 3.83   m/s a mean gradient of 23.48 mm. Hg and a calculated valve area of 0.75   sq. cm.   7. Estimated pulmonary artery systolic pressure is 101.4 mmHg assuming a   right atrial pressure of 15 mmHg, which is consistent with severe   pulmonary hypertension.   8. LA volume Index is 29.4 ml/m² ml/m2.    PHYSICIAN INTERPRETATION:  Left Ventricle: The left ventricular internal cavity size is normal. Left   ventricular wall thickness is normal. There is no left ventricular   hypertrophy. Global LV systolic function was mildly decreased. Left   ventricular ejection fraction, by visual estimation, is 45 to 50%.   Findings are consistent with global cardiomyopathy.  Right Ventricle: Normal right ventricular size and function.  Left Atrium: Normal left atrial size. LA volume Index is 29.4 ml/m² ml/m2.  Right Atrium: Normal right atrial size.  Pericardium: There is no evidence of pericardial effusion.  Mitral Valve: Structurally normal mitral valve, with normal leaflet   excursion. Mild mitral valve regurgitation is seen.  Tricuspid Valve: Structurally normal tricuspid valve, with normal leaflet   excursion. Mild tricuspid regurgitation is visualized. Estimated   pulmonary artery systolic pressure is 101.4 mmHg assuming a right atrial   pressure of 15 mmHg, which is consistent with severe pulmonary   hypertension.  Aortic Valve: Normal trileaflet aortic valve with normal opening. Mild   aortic valve regurgitation is seen. , the calculated aortic valve area   equals 0.75 cm² by the continuity equation consistent with moderate   aortic stenosis. Moderate to severe aortic stenosis with a peak AV   velocity of 3.83 m/s a mean gradient of 23.48 mm. Hg and a calculated   valve area of 0.75 sq. cm.  Pulmonic Valve: Structurally normal pulmonic valve, with normal leaflet   excursion. No indication of pulmonic valve regurgitation.  Aorta: The aortic root and ascending aorta are structurally normal, with   no evidence of dilitation.  Pulmonary Artery: The main pulmonary artery is normal in size.  Venous: The inferior vena cava was dilated, with respiratory size   variation less than 50%.       2D AND M-MODE MEASUREMENTS (normal ranges within parentheses):  Left                 Normal    Aorta/Left           Normal  Ventricle:                     Atrium:  IVSd (2D): 0.95 cm   (0.7-1.1) AoV Cusp      2.23   (1.5-2.6)  LVPWd      1.02 cm   (0.7-1.1) Separation:   cm  (2D):                          Left Atrium   3.43   (1.9-4.0)  LVIDd      5.40 cm   (3.4-5.7) (Mmode):      cm  (2D):                          Right  LVIDs      4.53 cm             Ventricle:  (2D):                          RVd (2D):   2.38 cm  LV FS      16.1 %    (>25%)    TAPSE:         1.70 cm  (2D):  IVSd       0.97 cm   (0.7-1.1)  (Mmode):  LVPWd      0.87 cm   (0.7-1.1)  (Mmode):  LVIDd      4.19 cm   (3.4-5.7)  (Mmode):  LVIDs      3.26 cm  (Mmode):  LV FS      22.3 %(>25%)  (Mmode):  Relative   0.38      (<0.42)  Wall  Thickness  Rel. Wall  0.41      (<0.42)  Thickness  Mm  LV Mass    62.8 g/m²  Index:  Mmode    SPECTRAL DOPPLER ANALYSIS:  LV DIASTOLIC FUNCTION:  MV Peak E: 1.23 m/s Decel Time: 171 msec  MV PeakA: 0.71 m/s  E/A Ratio: 1.73    Aortic Valve:  AoV VMax:    3.83 m/s  AoV Area, Vmax: 0.68 cm² Vmax Indx: 0.35 cm²/m²  AoV Pk Grad: 58.5 mmHg AoV Area, VTI:  0.75 cm² VTI Indx:  0.39 cm²/m²    LVOT Vmax: 0.80 m/s  LVOT VTI:  0.10 m  LVOT Diam: 2.04 cm    Mitral Valve:  MV P1/2 Time: 49.68 msec  MV Area, PHT: 4.43 cm²    Tricuspid Valve and PA/RV Systolic Pressure: TR Max Velocity: 4.65 m/s RA   Pressure: 15 mmHg RVSP/PASP: 101.4 mmHg       [id number] Latesha Murphy MD, Electronically signed on 12/22/2018 at   2:43:05 PM              *** Final ***                    LATESHA MURPHY MD  This document has been electronically signed. Dec 22 2018 10:47AM                LABS                        11.7   12.44 )-----------( 235      ( 24 Dec 2018 04:21 )             35.1     12-24    130<L>  |  90<L>  |  93<HH>  ----------------------------<  88  4.6   |  18  |  3.6<H>    Ca    8.7      24 Dec 2018 04:21  Phos  5.5     12-23  Mg     2.3     12-24    TPro  5.8<L>  /  Alb  3.0<L>  /  TBili  1.1  /  DBili  x   /  AST  187<H>  /  ALT  379<H>  /  AlkPhos  86  12-22    CARDIAC MARKERS ( 22 Dec 2018 11:46 )  x     / 1.86 ng/mL / 88 U/L / x     / 7.3 ng/mL      Magnesium, Serum: 2.3 mg/dL [1.8 - 2.4] (12-24-18 @ 04:21)  LIVER FUNCTIONS - ( 22 Dec 2018 21:08 )  Alb: 3.0 g/dL / Pro: 5.8 g/dL / ALK PHOS: 86 U/L / ALT: 379 U/L / AST: 187 U/L / GGT: x             A/P:  I discussed the case with Interventional Cardiologist Dr. Gonzalez   & recommends the following:    S/P PCI LCX  	         Continue DAPT, (asa 81mg, plavix 75mg daily) B-Blocker, Statin Therapy                   30 day supply of DAPT given by RN for home use only                    oob-ch, ambulate with assistance                     f/u repeat echo                    f/u renal  and pulm recommendations                    Pt given instructions on importance of taking antiplatelet medication or risk acute stent thrombosis/death                   Post cath instructions, access site care and activity restrictions reviewed with patient                     Discussed with patient to return to hospital if experience chest pain, shortness breath, dizziness and site bleeding                   Aggressive risk factor modification, diet counseling, smoking cessation discussed with patient                       f/u with Dr. Gonzalez Cardiology Follow up    DANILO LINDSAY   83yMale  PAST MEDICAL & SURGICAL HISTORY:  Glaucoma  History of appendectomy    Allergies    No Known Allergies    Intolerances        Patient without complaints.   Denies CP, SOB, palpitations, or dizziness      Vital Signs Last 24 Hrs  T(C): 36 (24 Dec 2018 04:00), Max: 36.6 (23 Dec 2018 20:00)  T(F): 96.8 (24 Dec 2018 04:00), Max: 97.8 (23 Dec 2018 20:00)  HR: 74 (24 Dec 2018 06:00) (72 - 88)  BP: 109/68 (24 Dec 2018 06:00) (97/54 - 118/62)  BP(mean): 79 (24 Dec 2018 06:00) (65 - 79)  RR: 25 (24 Dec 2018 06:00) (21 - 39)  SpO2: 92% (24 Dec 2018 06:00) (92% - 99%)Allergies    REVIEW OF SYSTEMS:    CONSTITUTIONAL: No weakness, fevers or chills  EYES/ENT: No visual changes;  No vertigo or throat pain   NECK: No pain or stiffness  RESPIRATORY: No cough, wheezing, hemoptysis; No shortness of breath  CARDIOVASCULAR: No chest pain or palpitations  GASTROINTESTINAL: No abdominal or epigastric pain. No nausea, vomiting, or hematemesis; No diarrhea or constipation. No melena or hematochezia.  GENITOURINARY: No dysuria, frequency or hematuria  NEUROLOGICAL: No numbness or weakness  SKIN: No itching, rashes          NAD, appears well  S1S2, + murmurs, no JVD  CTA B/L, no wheeze, no rales  SNT +BS  Ext:    Right Groin:  NO hematoma or bleeding   NO bruit, C/D/I , + pulses  Pulses:  +Rad/ +PTs /+DPs/ same as baseline  A&Ox 3    EKG        Ventricular Rate 74 BPM    Atrial Rate 74 BPM    P-R Interval 194 ms    QRS Duration 110 ms    Q-T Interval 428 ms    QTC Calculation(Bezet) 475 ms    P Axis 56 degrees    R Axis 55 degrees    T Axis 21 degrees    Diagnosis Line Normal sinus rhythm  Possible Left atrial enlargement  Cannot rule out Anterior infarct , age undetermined  Abnormal ECG    Confirmed by Poonam Woods MD [id number]) on 12/24/2018 9:24:39 AM                                                                                                               2D ECHO  EXAM:  2-D ECHO (TTE) COMPLETE        PROCEDURE DATE:  12/22/2018      INTERPRETATION:  REPORT:    TRANSTHORACIC ECHOCARDIOGRAM REPORT         Patient Name:   DANILO LINDSAY Accession #: [id number]  Medical Rec #:  [id number]        Height:      68.0 in 172.7 cm  YOB: 1935       Weight:      178.0 lb 80.74 kg  Patient Age:    83 years         BSA:         1.95 m²  Patient Gender: M                BP:          111/59 mmHg       Date of Exam:        12/22/2018 10:47:43 AM  Referring Physician: [id number] ED UNASSIGNED  Sonographer:         Sterling holliday  Reading Physician:   Latesha Murphy MD.    Procedure:     2D Echo/Doppler/Color Doppler Complete.  Indications:   R07.9 - Chest Pain, unspecified  Diagnosis:     R07.9- Chest Pain, unspecified  Study Details: Technically fair study.         Summary:   1. Left ventricular ejection fraction, by visual estimation, is 45 to   50%.   2. Mildly decreased global left ventricular systolic function.   3. Global cardiomyopathy.   4. Mild tricuspid regurgitation.   5. Mild aortic regurgitation.   6. Moderate to severe aortic stenosis with a peak AV velocity of 3.83   m/s a mean gradient of 23.48 mm. Hg and a calculated valve area of 0.75   sq. cm.   7. Estimated pulmonary artery systolic pressure is 101.4 mmHg assuming a   right atrial pressure of 15 mmHg, which is consistent with severe   pulmonary hypertension.   8. LA volume Index is 29.4 ml/m² ml/m2.    PHYSICIAN INTERPRETATION:  Left Ventricle: The left ventricular internal cavity size is normal. Left   ventricular wall thickness is normal. There is no left ventricular   hypertrophy. Global LV systolic function was mildly decreased. Left   ventricular ejection fraction, by visual estimation, is 45 to 50%.   Findings are consistent with global cardiomyopathy.  Right Ventricle: Normal right ventricular size and function.  Left Atrium: Normal left atrial size. LA volume Index is 29.4 ml/m² ml/m2.  Right Atrium: Normal right atrial size.  Pericardium: There is no evidence of pericardial effusion.  Mitral Valve: Structurally normal mitral valve, with normal leaflet   excursion. Mild mitral valve regurgitation is seen.  Tricuspid Valve: Structurally normal tricuspid valve, with normal leaflet   excursion. Mild tricuspid regurgitation is visualized. Estimated   pulmonary artery systolic pressure is 101.4 mmHg assuming a right atrial   pressure of 15 mmHg, which is consistent with severe pulmonary   hypertension.  Aortic Valve: Normal trileaflet aortic valve with normal opening. Mild   aortic valve regurgitation is seen. , the calculated aortic valve area   equals 0.75 cm² by the continuity equation consistent with moderate   aortic stenosis. Moderate to severe aortic stenosis with a peak AV   velocity of 3.83 m/s a mean gradient of 23.48 mm. Hg and a calculated   valve area of 0.75 sq. cm.  Pulmonic Valve: Structurally normal pulmonic valve, with normal leaflet   excursion. No indication of pulmonic valve regurgitation.  Aorta: The aortic root and ascending aorta are structurally normal, with   no evidence of dilitation.  Pulmonary Artery: The main pulmonary artery is normal in size.  Venous: The inferior vena cava was dilated, with respiratory size   variation less than 50%.       2D AND M-MODE MEASUREMENTS (normal ranges within parentheses):  Left                 Normal    Aorta/Left           Normal  Ventricle:                     Atrium:  IVSd (2D): 0.95 cm   (0.7-1.1) AoV Cusp      2.23   (1.5-2.6)  LVPWd      1.02 cm   (0.7-1.1) Separation:   cm  (2D):                          Left Atrium   3.43   (1.9-4.0)  LVIDd      5.40 cm   (3.4-5.7) (Mmode):      cm  (2D):                          Right  LVIDs      4.53 cm             Ventricle:  (2D):                          RVd (2D):   2.38 cm  LV FS      16.1 %    (>25%)    TAPSE:         1.70 cm  (2D):  IVSd       0.97 cm   (0.7-1.1)  (Mmode):  LVPWd      0.87 cm   (0.7-1.1)  (Mmode):  LVIDd      4.19 cm   (3.4-5.7)  (Mmode):  LVIDs      3.26 cm  (Mmode):  LV FS      22.3 %(>25%)  (Mmode):  Relative   0.38      (<0.42)  Wall  Thickness  Rel. Wall  0.41      (<0.42)  Thickness  Mm  LV Mass    62.8 g/m²  Index:  Mmode    SPECTRAL DOPPLER ANALYSIS:  LV DIASTOLIC FUNCTION:  MV Peak E: 1.23 m/s Decel Time: 171 msec  MV PeakA: 0.71 m/s  E/A Ratio: 1.73    Aortic Valve:  AoV VMax:    3.83 m/s  AoV Area, Vmax: 0.68 cm² Vmax Indx: 0.35 cm²/m²  AoV Pk Grad: 58.5 mmHg AoV Area, VTI:  0.75 cm² VTI Indx:  0.39 cm²/m²    LVOT Vmax: 0.80 m/s  LVOT VTI:  0.10 m  LVOT Diam: 2.04 cm    Mitral Valve:  MV P1/2 Time: 49.68 msec  MV Area, PHT: 4.43 cm²    Tricuspid Valve and PA/RV Systolic Pressure: TR Max Velocity: 4.65 m/s RA   Pressure: 15 mmHg RVSP/PASP: 101.4 mmHg       [id number] Latesha Murphy MD, Electronically signed on 12/22/2018 at   2:43:05 PM              *** Final ***                    LATESHA MURPHY MD  This document has been electronically signed. Dec 22 2018 10:47AM                LABS                        11.7   12.44 )-----------( 235      ( 24 Dec 2018 04:21 )             35.1     12-24    130<L>  |  90<L>  |  93<HH>  ----------------------------<  88  4.6   |  18  |  3.6<H>    Ca    8.7      24 Dec 2018 04:21  Phos  5.5     12-23  Mg     2.3     12-24    TPro  5.8<L>  /  Alb  3.0<L>  /  TBili  1.1  /  DBili  x   /  AST  187<H>  /  ALT  379<H>  /  AlkPhos  86  12-22    CARDIAC MARKERS ( 22 Dec 2018 11:46 )  x     / 1.86 ng/mL / 88 U/L / x     / 7.3 ng/mL      Magnesium, Serum: 2.3 mg/dL [1.8 - 2.4] (12-24-18 @ 04:21)  LIVER FUNCTIONS - ( 22 Dec 2018 21:08 )  Alb: 3.0 g/dL / Pro: 5.8 g/dL / ALK PHOS: 86 U/L / ALT: 379 U/L / AST: 187 U/L / GGT: x             A/P:  I discussed the case with Interventional Cardiologist Dr. Gonzalez   & recommends the following:    S/P PCI LCX  	         Continue DAPT, (asa 81mg, plavix 75mg daily) B-Blocker, Statin Therapy                   30 day supply of DAPT given by RN for home use only                    oob-ch, ambulate with assistance                     f/u repeat echo                    f/u renal  and pulm recommendations                    Pt given instructions on importance of taking antiplatelet medication or risk acute stent thrombosis/death                   Post cath instructions, access site care and activity restrictions reviewed with patient                     Discussed with patient to return to hospital if experience chest pain, shortness breath, dizziness and site bleeding                   Aggressive risk factor modification, diet counseling, smoking cessation discussed with patient                       f/u with Dr. Gonzalez                    Change Lasix to PRN in view of pre-renal KIAN and in view of maintaining good saturation

## 2018-12-24 NOTE — PROGRESS NOTE ADULT - ASSESSMENT
IMPRESSION:  Acute resp failure   most likely secondary to acute pulmonary edema   pulmonary HTN   dvt ?? PE   STEMI  KIAN   sevre AS    PLAN:    CNS: no sedation     HEENT: oral care     PULMONARY: taper fio2 keep pox . 92%     CARDIOVASCULAR: heparin drip follow PTT   follow cardiology regarding Aortic stenosis doubt the pulm Pressure accurate as per cardiology might be reflecting an MR   patient denies any symptoms before   consider lasix 40 mg Q 24 hrs if renal want to hold today we can   optimize cardiac work up     GI: GI prophylaxis.  Feeding     RENAL: follow lytes , renal Us follow result renal consult   send urine lytes cr     INFECTIOUS DISEASE: no abx now will follow     HEMATOLOGICAL:  DVT prophylaxis. on heparin drip follow PTT     ENDOCRINE:  Follow up FS.  Insulin protocol if needed.    addendum note :       CRITICAL CARE TIME SPENT: ***

## 2018-12-25 LAB
ALBUMIN SERPL ELPH-MCNC: 3 G/DL — LOW (ref 3.5–5.2)
ALP SERPL-CCNC: 90 U/L — SIGNIFICANT CHANGE UP (ref 30–115)
ALT FLD-CCNC: 208 U/L — HIGH (ref 0–41)
ANION GAP SERPL CALC-SCNC: 18 MMOL/L — HIGH (ref 7–14)
APTT BLD: 57.3 SEC — HIGH (ref 27–39.2)
APTT BLD: 61.7 SEC — HIGH (ref 27–39.2)
APTT BLD: 68.7 SEC — HIGH (ref 27–39.2)
APTT BLD: 70.7 SEC — CRITICAL HIGH (ref 27–39.2)
AST SERPL-CCNC: 68 U/L — HIGH (ref 0–41)
BASOPHILS # BLD AUTO: 0.01 K/UL — SIGNIFICANT CHANGE UP (ref 0–0.2)
BASOPHILS NFR BLD AUTO: 0.1 % — SIGNIFICANT CHANGE UP (ref 0–1)
BILIRUB SERPL-MCNC: 0.6 MG/DL — SIGNIFICANT CHANGE UP (ref 0.2–1.2)
BUN SERPL-MCNC: 85 MG/DL — CRITICAL HIGH (ref 10–20)
CALCIUM SERPL-MCNC: 8.6 MG/DL — SIGNIFICANT CHANGE UP (ref 8.5–10.1)
CHLORIDE SERPL-SCNC: 96 MMOL/L — LOW (ref 98–110)
CO2 SERPL-SCNC: 22 MMOL/L — SIGNIFICANT CHANGE UP (ref 17–32)
CREAT SERPL-MCNC: 2.9 MG/DL — HIGH (ref 0.7–1.5)
CULTURE RESULTS: NO GROWTH — SIGNIFICANT CHANGE UP
EOSINOPHIL # BLD AUTO: 0.21 K/UL — SIGNIFICANT CHANGE UP (ref 0–0.7)
EOSINOPHIL NFR BLD AUTO: 1.8 % — SIGNIFICANT CHANGE UP (ref 0–8)
GLUCOSE SERPL-MCNC: 96 MG/DL — SIGNIFICANT CHANGE UP (ref 70–99)
HCT VFR BLD CALC: 35.6 % — LOW (ref 42–52)
HGB BLD-MCNC: 11.9 G/DL — LOW (ref 14–18)
IMM GRANULOCYTES NFR BLD AUTO: 1.3 % — HIGH (ref 0.1–0.3)
INR BLD: 1.35 RATIO — HIGH (ref 0.65–1.3)
INR BLD: 1.43 RATIO — HIGH (ref 0.65–1.3)
INR BLD: 1.46 RATIO — HIGH (ref 0.65–1.3)
LYMPHOCYTES # BLD AUTO: 1.2 K/UL — SIGNIFICANT CHANGE UP (ref 1.2–3.4)
LYMPHOCYTES # BLD AUTO: 10.4 % — LOW (ref 20.5–51.1)
MAGNESIUM SERPL-MCNC: 2.5 MG/DL — HIGH (ref 1.8–2.4)
MCHC RBC-ENTMCNC: 28.2 PG — SIGNIFICANT CHANGE UP (ref 27–31)
MCHC RBC-ENTMCNC: 33.4 G/DL — SIGNIFICANT CHANGE UP (ref 32–37)
MCV RBC AUTO: 84.4 FL — SIGNIFICANT CHANGE UP (ref 80–94)
MONOCYTES # BLD AUTO: 1.11 K/UL — HIGH (ref 0.1–0.6)
MONOCYTES NFR BLD AUTO: 9.6 % — HIGH (ref 1.7–9.3)
NEUTROPHILS # BLD AUTO: 8.86 K/UL — HIGH (ref 1.4–6.5)
NEUTROPHILS NFR BLD AUTO: 76.8 % — HIGH (ref 42.2–75.2)
NRBC # BLD: 0 /100 WBCS — SIGNIFICANT CHANGE UP (ref 0–0)
PHOSPHATE SERPL-MCNC: 4.9 MG/DL — SIGNIFICANT CHANGE UP (ref 2.1–4.9)
PLATELET # BLD AUTO: 271 K/UL — SIGNIFICANT CHANGE UP (ref 130–400)
POTASSIUM SERPL-MCNC: 4.2 MMOL/L — SIGNIFICANT CHANGE UP (ref 3.5–5)
POTASSIUM SERPL-SCNC: 4.2 MMOL/L — SIGNIFICANT CHANGE UP (ref 3.5–5)
PROT SERPL-MCNC: 5.9 G/DL — LOW (ref 6–8)
PROTHROM AB SERPL-ACNC: 15.5 SEC — HIGH (ref 9.95–12.87)
PROTHROM AB SERPL-ACNC: 16.4 SEC — HIGH (ref 9.95–12.87)
PROTHROM AB SERPL-ACNC: 16.7 SEC — HIGH (ref 9.95–12.87)
RBC # BLD: 4.22 M/UL — LOW (ref 4.7–6.1)
RBC # FLD: 13.9 % — SIGNIFICANT CHANGE UP (ref 11.5–14.5)
SODIUM SERPL-SCNC: 136 MMOL/L — SIGNIFICANT CHANGE UP (ref 135–146)
SPECIMEN SOURCE: SIGNIFICANT CHANGE UP
WBC # BLD: 11.54 K/UL — HIGH (ref 4.8–10.8)
WBC # FLD AUTO: 11.54 K/UL — HIGH (ref 4.8–10.8)

## 2018-12-25 RX ORDER — FUROSEMIDE 40 MG
40 TABLET ORAL ONCE
Qty: 0 | Refills: 0 | Status: DISCONTINUED | OUTPATIENT
Start: 2018-12-25 | End: 2018-12-27

## 2018-12-25 RX ORDER — WARFARIN SODIUM 2.5 MG/1
5 TABLET ORAL DAILY
Qty: 0 | Refills: 0 | Status: DISCONTINUED | OUTPATIENT
Start: 2018-12-25 | End: 2018-12-26

## 2018-12-25 RX ORDER — AMIODARONE HYDROCHLORIDE 400 MG/1
200 TABLET ORAL DAILY
Qty: 0 | Refills: 0 | Status: DISCONTINUED | OUTPATIENT
Start: 2018-12-25 | End: 2018-12-26

## 2018-12-25 RX ORDER — FUROSEMIDE 40 MG
40 TABLET ORAL DAILY
Qty: 0 | Refills: 0 | Status: DISCONTINUED | OUTPATIENT
Start: 2018-12-26 | End: 2018-12-27

## 2018-12-25 RX ADMIN — WARFARIN SODIUM 5 MILLIGRAM(S): 2.5 TABLET ORAL at 21:26

## 2018-12-25 RX ADMIN — Medication 325 MILLIGRAM(S): at 21:26

## 2018-12-25 RX ADMIN — Medication 50 MILLIGRAM(S): at 06:15

## 2018-12-25 RX ADMIN — Medication 325 MILLIGRAM(S): at 13:52

## 2018-12-25 RX ADMIN — AMIODARONE HYDROCHLORIDE 200 MILLIGRAM(S): 400 TABLET ORAL at 16:00

## 2018-12-25 RX ADMIN — CLOPIDOGREL BISULFATE 75 MILLIGRAM(S): 75 TABLET, FILM COATED ORAL at 12:36

## 2018-12-25 RX ADMIN — ATORVASTATIN CALCIUM 40 MILLIGRAM(S): 80 TABLET, FILM COATED ORAL at 21:25

## 2018-12-25 RX ADMIN — Medication 50 MILLIGRAM(S): at 18:03

## 2018-12-25 RX ADMIN — Medication 1 DROP(S): at 12:36

## 2018-12-25 RX ADMIN — Medication 325 MILLIGRAM(S): at 06:15

## 2018-12-25 RX ADMIN — Medication 81 MILLIGRAM(S): at 12:36

## 2018-12-25 NOTE — CONSULT NOTE ADULT - ASSESSMENT
82 y/o M with PMHx glaucoma, no cardiac hx p/w shortness of breath to the south side was found to have new onset LBBB. patient was transsfered to the [facility] as code STEMI. patient cath showed mid LCX stenosis s/p PCI. patient developed new onset Afib with aberrancy.      #Afib with aberrancy   CHADsVasc of 3  patient is now NSR with no symptoms  EKG Afib with aberrancy   2D echo from 12/24 showing EF of 73 %, Moderate MR and pulmonary HTN  currently on heparin drip    recommendation  TSH  c/w metoprolol  add amiodarone for rhythm control  switch heparin to coumadin with the DAPT with goal of INR of 2-3 82 y/o M with PMHx glaucoma, no cardiac hx p/w shortness of breath to the south side was found to have new onset LBBB. patient was transsfered to the [facility] as code STEMI. patient cath showed mid LCX stenosis s/p PCI. patient developed new onset Afib with aberrancy.      #Afib with aberrancy   CHADsVasc of 3  patient is now NSR with no symptoms  EKG Afib with aberrancy   2D echo from 12/24 showing EF of 73 %, Moderate MR and pulmonary HTN  currently on heparin drip    recommendation  TSH  c/w metoprolol  add amiodarone for rhythm control if patient has recurrent AFib  switch heparin to coumadin with the DAPT with goal of INR of 2-3

## 2018-12-25 NOTE — PROGRESS NOTE ADULT - ASSESSMENT
84 y/o M with PMHx glaucoma, no cardiac hx, c/o sob on minimal exertion x2 days. EKG showed new onset LBBB.    #CODE STEMI, new LBBB  - s/p PCI to LCX 12/21 (Dr. Craig)  -Cath with Dr Craig 12/21 coronary artery disease status post PCI, stent to mid LCX on 12/21  -cw asa, plavix, heparin gtt  -monitor ptt -acs protocol  -CE x 3 0.75 > 0.75 > 0.65  -on Lasix 40 Q24  -Echo 12/22 showed EF 45%, global cardiomyopathy, severe AS, Estimated pulmonary artery systolic pressure is 101.4 mmHg assuming a right atrial pressure of 15 mmHg, which is consistent with severe pulmonary hypertension.  -12/20 Pt with rate dependent LBBB, had several runs of sustained afib RVR in 150s-160s. Pt was asymptomatic, normotensive during episodes. 12 lead EKG showed afib RVR with LBBB. received amio bolus and drip.  -Increased His metoprolol to 50 BID today 12/23 > if HR still >100 consider increasing to 50-25-50 starting 12/24 as per cardio  -Repeat TTE ordered as per cardio      #KIAN due to AKN or ANN  -creat 3.5, was 1.2 on admission  -K 5.4  -Repeat bmp ordered, Nephro on board  - FeNa appears prerenal (as seen in ANN or heart failure)  - kidney sono neg for hydro 12/23  - cont Linares to monitor Is and Os  - phos 5.5 12/23  - decreased LAsix to 40 IV qd fro q 12 hrs  - no need for RRT now    #Glaucoma:   - c/w eye drops        DVT PPx: on therapeutic heparin gtt  GI PPx: Not indicated  Activity as tolerated  Diet: regular  Dispo: Home      Attending Attestation:   84 y/o M with PMHx glaucoma, no cardiac hx, c/o sob on minimal exertion x2 days. EKG showed new onset LBBB, status post cath, PCI to to mid LCX on 12/21, course complicated by development of DVT , suspicion for Pulmonary embolism and KIAN      Principal Diagnoses:  coronary artery disease status post PCI, stent to mid LCX on 12/21  Non ST elevation Myocardial Infarction   atrial fibrillation with RVR  deep venous thrombosis   KIAN( possibly secondary to ATN) on chronic renal disease stage 3  AG metabolic acidosis  new onset CHF, valvular with mildly reduced EF  Acute resp failure secondary to pulmonary edema  Pulmonary HTN   Severe AS        - started on heparin infusion for DVT, possibility of PE( CTA not performed because of KIAN)  -post PCI care in CCU as per cardiology  -continue with BB, statins and DAPT  -Amiodarone discontinued by cardio  -Electrolyte supplementation and follow up with BMP (Monitor Electrolytes Qdaily, Keep K+>4, Mg>2)  - diuresis with Lasix reduce to 40 milligrams IV once daily because KIAN  -nephrology consult appreciated

## 2018-12-25 NOTE — CONSULT NOTE ADULT - SUBJECTIVE AND OBJECTIVE BOX
HPI:  84 y/o M with PMHx glaucoma, no cardiac hx, c/o sob on minimal exertion x2 days. PTP he felt as if there was a sense of congestion in his upper airways, which he would only feel if he was exerting himself. Right before presentation he was going up and down some stairs and had the same feeling, but denies any chest pain/pressure/neck or jaw pain/nausea/diaphoresis. At baseline he has no exercise limitation, denies any family hx of heart dx, has had no cardiac interventions in the past. He went to Dr Joy's office, who did EKG showing new LBBB. He was sent to [facility] ER, EKG showed same finding, CODE STEMI was called and he was transferred to [place].   12/20 Pt with rate dependent LBBB, had several runs of sustained afib RVR in 150s-160s. Patient was asymptomatic, normotensive during episodes. 12 lead EKG showed afib RVR with LBBB. received amio bolus and drip.  Patient underwent cardiac cath on 12/21 with PCI to LCX. Patient was started on DAPT. patient was also found to have left poplitial DVT.  EP was consulted for further management of his Afib       ROS:  All other systems reviewed and are negative    PAST MEDICAL & SURGICAL HISTORY  Glaucoma  History of appendectomy      FAMILY HISTORY:  FAMILY HISTORY:  No significant family history of cardiac disease      SOCIAL HISTORY:  Former smoker, quit 30 yrs ago, used to smoke 3 packs/day, started when he was a teenager.    ALLERGIES:  No Known Allergies      MEDICATIONS:  MEDICATIONS  (STANDING):  aspirin  chewable 81 milliGRAM(s) Oral daily  atorvastatin 40 milliGRAM(s) Oral at bedtime  chlorhexidine 4% Liquid 1 Application(s) Topical <User Schedule>  clopidogrel Tablet 75 milliGRAM(s) Oral daily  furosemide   Injectable 40 milliGRAM(s) IV Push once  heparin  Infusion 1000 Unit(s)/Hr (10 mL/Hr) IV Continuous <Continuous>  metoprolol tartrate 50 milliGRAM(s) Oral two times a day  sodium bicarbonate 325 milliGRAM(s) Oral three times a day  timolol 0.5% Solution 1 Drop(s) Both EYES daily    MEDICATIONS  (PRN):      HOME MEDICATIONS:  Home Medications:  timolol hemihydrate 0.5% ophthalmic solution: 1 drop(s) to each affected eye 2 times a day (19 Dec 2018 19:04)      VITALS:   T(F): 97.2 (12-25 @ 04:00), Max: 98.1 (12-22 @ 20:05)  HR: 74 (12-25 @ 09:00) (66 - 134)  BP: 103/61 (12-25 @ 08:00) (85/50 - 128/65)  BP(mean): 75 (12-25 @ 08:00) (59 - 92)  RR: 22 (12-25 @ 09:00) (19 - 39)  SpO2: 97% (12-25 @ 09:00) (91% - 99%)    I&O's Summary    24 Dec 2018 07:01  -  25 Dec 2018 07:00  --------------------------------------------------------  IN: 1170 mL / OUT: 2105 mL / NET: -935 mL    25 Dec 2018 07:01  -  25 Dec 2018 10:37  --------------------------------------------------------  IN: 270 mL / OUT: 165 mL / NET: 105 mL        PHYSICAL EXAM:  GEN: Alert and oriented X 3, Well nourished, No acute distress  NECK: Supple, no JVD  LUNGS: Clear to auscultation bilaterally  CARDIOVASCULAR: S1/S2 regular ,  ejection systolic murmur  ABD: Soft, non-tender  EXT: No Lower extremity edema/cyanosis  NEURO: Non focal  SKIN: Intact    LABS:                        11.9   11.54 )-----------( 271      ( 25 Dec 2018 05:00 )             35.6     12-25    136  |  96<L>  |  85<HH>  ----------------------------<  96  4.2   |  22  |  2.9<H>    Ca    8.6      25 Dec 2018 05:00  Phos  4.9     12-25  Mg     2.5     12-25    TPro  5.9<L>  /  Alb  3.0<L>  /  TBili  0.6  /  DBili  x   /  AST  68<H>  /  ALT  208<H>  /  AlkPhos  90  12-25    PT/INR - ( 25 Dec 2018 00:39 )   PT: 16.70 sec;   INR: 1.46 ratio         PTT - ( 25 Dec 2018 05:00 )  PTT:68.7 sec                12-19 Chol 182  HDL 52 Trig 96  Hemoglobin A1C   Thyroid      RADIOLOGY:  -CXR:  < from: Xray Chest 1 View- PORTABLE-Routine (12.24.18 @ 07:32) >  Impression:      Minimal improvement in bilateral perihilar opacities. Trace left pleural   effusion. No pneumothorax      -TTE:  < from: Transthoracic Echocardiogram (12.24.18 @ 15:20) >    Summary:   1. LV Ejection Fraction by Geller's Method with a biplane EF of 73 %.   2. Normal left ventricular size and wall thicknesses, with normal   systolic function.   3. The mean global longitudinal strain by speckle tracking is -17.2%   which is borderline.   4. Mild to moderate mitral valve regurgitation.   5. Mild tricuspid regurgitation.   6. Mild aortic regurgitation.   7. Fibrocalcific AV with normal opening.   8. Pulmonic valve regurgitation.   9. Estimated pulmonary artery systolic pressure is 99.9 mmHg assuming a   right atrial pressure of 15 mmHg, which is consistent with severe   pulmonary hypertension.  10. LA volume Index is 24.4 ml/m² ml/m2.        -CATHETERIZATION:  Left Heart Catheterization:    LEFT HEART CATHERIZATION                                    Left main patent     LAD:   Prox LAD 80-90%                         Left Circumflex: MId LCx 100% , VANDANA 0    Right Cornary Artery: Patent     INTERVENTION  PCI COBRA stent to mid LCX     ECG:    < from: 12 Lead ECG (12.25.18 @ 07:43) >    Ventricular Rate 70 BPM    Atrial Rate 70 BPM    P-R Interval 194 ms    QRS Duration 104 ms    Q-T Interval 432 ms    QTC Calculation(Bezet) 466 ms    P Axis 53 degrees    R Axis 64 degrees    T Axis 26 degrees    Diagnosis Line Normal sinus rhythm  Possible Left atrial enlargement  Borderline ECG    < from: 12 Lead ECG (12.20.18 @ 20:23) >    Ventricular Rate 143 BPM    Atrial Rate 108 BPM    QRS Duration 128 ms    Q-T Interval 332 ms    QTC Calculation(Bezet) 512 ms    R Axis 13 degrees    T Axis 172 degrees    Diagnosis Line Atrial fibrillation with rapid ventricular response  Non-specific intra-ventricular conduction block  T wave abnormality, consider lateral ischemia  Abnormal ECG    Confirmed by Dustin Arellano ([id number]) on 12/20/2018 9:12:10 PM    < end of copied text > HPI:  84 y/o M with PMHx glaucoma, no cardiac hx, c/o sob on minimal exertion x2 days. PTP he felt as if there was a sense of congestion in his upper airways, which he would only feel if he was exerting himself. Right before presentation he was going up and down some stairs and had the same feeling, but denies any chest pain/pressure/neck or jaw pain/nausea/diaphoresis. At baseline he has no exercise limitation, denies any family hx of heart dx, has had no cardiac interventions in the past. He went to Dr Joy's office, who did EKG showing new LBBB. He was sent to [facility] ER, EKG showed same finding, CODE STEMI was called and he was transferred to [place].   12/20 Pt with rate dependent LBBB, had several runs of sustained afib RVR in 150s-160s. Patient was asymptomatic, normotensive during episodes. 12 lead EKG showed afib RVR with LBBB. received amio bolus and drip.  Patient underwent cardiac cath on 12/21 with PCI to LCX. Patient was started on DAPT. patient was also found to have left poplitial DVT.  EP was consulted for further management of his Afib       ROS:  All other systems reviewed and are negative    PAST MEDICAL & SURGICAL HISTORY  Glaucoma  History of appendectomy      FAMILY HISTORY:  FAMILY HISTORY:  No significant family history of cardiac disease      SOCIAL HISTORY:  Former smoker, quit 30 yrs ago, used to smoke 3 packs/day, started when he was a teenager.    ALLERGIES:  No Known Allergies      MEDICATIONS:  MEDICATIONS  (STANDING):  aspirin  chewable 81 milliGRAM(s) Oral daily  atorvastatin 40 milliGRAM(s) Oral at bedtime  chlorhexidine 4% Liquid 1 Application(s) Topical <User Schedule>  clopidogrel Tablet 75 milliGRAM(s) Oral daily  furosemide   Injectable 40 milliGRAM(s) IV Push once  heparin  Infusion 1000 Unit(s)/Hr (10 mL/Hr) IV Continuous <Continuous>  metoprolol tartrate 50 milliGRAM(s) Oral two times a day  sodium bicarbonate 325 milliGRAM(s) Oral three times a day  timolol 0.5% Solution 1 Drop(s) Both EYES daily    MEDICATIONS  (PRN):      HOME MEDICATIONS:  Home Medications:  timolol hemihydrate 0.5% ophthalmic solution: 1 drop(s) to each affected eye 2 times a day (19 Dec 2018 19:04)      VITALS:   T(F): 97.2 (12-25 @ 04:00), Max: 98.1 (12-22 @ 20:05)  HR: 74 (12-25 @ 09:00) (66 - 134)  BP: 103/61 (12-25 @ 08:00) (85/50 - 128/65)  BP(mean): 75 (12-25 @ 08:00) (59 - 92)  RR: 22 (12-25 @ 09:00) (19 - 39)  SpO2: 97% (12-25 @ 09:00) (91% - 99%)    I&O's Summary    24 Dec 2018 07:01  -  25 Dec 2018 07:00  --------------------------------------------------------  IN: 1170 mL / OUT: 2105 mL / NET: -935 mL    25 Dec 2018 07:01  -  25 Dec 2018 10:37  --------------------------------------------------------  IN: 270 mL / OUT: 165 mL / NET: 105 mL        PHYSICAL EXAM:  GEN: Alert and oriented X 3, Well nourished, No acute distress  NECK: Supple, no JVD  LUNGS: Clear to auscultation bilaterally  CARDIOVASCULAR: S1/S2 regular ,  ejection systolic murmur  ABD: Soft, non-tender  EXT: No Lower extremity edema/cyanosis  NEURO: Non focal  SKIN: Intact    LABS:                        11.9   11.54 )-----------( 271      ( 25 Dec 2018 05:00 )             35.6     12-25    136  |  96<L>  |  85<HH>  ----------------------------<  96  4.2   |  22  |  2.9<H>    Ca    8.6      25 Dec 2018 05:00  Phos  4.9     12-25  Mg     2.5     12-25    TPro  5.9<L>  /  Alb  3.0<L>  /  TBili  0.6  /  DBili  x   /  AST  68<H>  /  ALT  208<H>  /  AlkPhos  90  12-25    PT/INR - ( 25 Dec 2018 00:39 )   PT: 16.70 sec;   INR: 1.46 ratio         PTT - ( 25 Dec 2018 05:00 )  PTT:68.7 sec          12-19 Chol 182  HDL 52 Trig 96  Hemoglobin A1C   Thyroid      RADIOLOGY:  -CXR:  < from: Xray Chest 1 View- PORTABLE-Routine (12.24.18 @ 07:32) >  Impression:      Minimal improvement in bilateral perihilar opacities. Trace left pleural   effusion. No pneumothorax      -TTE:  < from: Transthoracic Echocardiogram (12.24.18 @ 15:20) >    Summary:   1. LV Ejection Fraction by Geller's Method with a biplane EF of 73 %.   2. Normal left ventricular size and wall thicknesses, with normal systolic function.   3. The mean global longitudinal strain by speckle tracking is -17.2% which is borderline.   4. Mild to moderate mitral valve regurgitation.   5. Mild tricuspid regurgitation.   6. Mild aortic regurgitation.   7. Fibrocalcific AV with normal opening.   8. Pulmonic valve regurgitation.   9. Estimated pulmonary artery systolic pressure is 99.9 mmHg assuming a   right atrial pressure of 15 mmHg, which is consistent with severe   pulmonary hypertension.  10. LA volume Index is 24.4 ml/m² ml/m2.        -CATHETERIZATION:  Left Heart Catheterization:    LEFT HEART CATHERIZATION                                    Left main patent     LAD:   Prox LAD 80-90%                         Left Circumflex: MId LCx 100% , VANDANA 0    Right Cornary Artery: Patent     INTERVENTION  PCI COBRA stent to mid LCX     ECG:    < from: 12 Lead ECG (12.25.18 @ 07:43) >    Ventricular Rate 70 BPM    Atrial Rate 70 BPM    P-R Interval 194 ms    QRS Duration 104 ms    Q-T Interval 432 ms    QTC Calculation(Bezet) 466 ms    P Axis 53 degrees    R Axis 64 degrees    T Axis 26 degrees    Diagnosis Line Normal sinus rhythm  Possible Left atrial enlargement  Borderline ECG    < from: 12 Lead ECG (12.20.18 @ 20:23) >    Ventricular Rate 143 BPM    Atrial Rate 108 BPM    QRS Duration 128 ms    Q-T Interval 332 ms    QTC Calculation(Bezet) 512 ms    R Axis 13 degrees    T Axis 172 degrees    Diagnosis Line Atrial fibrillation with rapid ventricular response  Non-specific intra-ventricular conduction block  T wave abnormality, consider lateral ischemia  Abnormal ECG    Confirmed by Dustin Arellano ([id number]) on 12/20/2018 9:12:10 PM    < end of copied text >

## 2018-12-25 NOTE — CONSULT NOTE ADULT - ATTENDING COMMENTS
agree    stable CE fu ecg and CE will eval for cath.
Patient seen and examined.    82 yo M with no sig cardiac history admitted with dyspnea and chest tightness. He had an ECG that showed new LBBB. He had rate related LBBB and several episodes of AFib with RVR while in the hospital on 12/20. Cath 12/21 with PCI to LCx. Pt also found to have left popliteal DVT.    AFib with RVR and rate related abberancy    Recommend  - Check TSH  - LFTs elevated, but trending down  - Recommend Amio if AFib recurs  - Continue BB  - Warfarin with DAPT for triple therapy according to WOEST trial

## 2018-12-25 NOTE — PROGRESS NOTE ADULT - ASSESSMENT
83/M with KIAN following STEMi, angioplasty, new Afib with RVR, CHF.    KIAN due to AKN or ANN  - FeNa appears prerenal   - kideny sono neg for hydro  - cont Linares to monitor Is and Os, 1.3 liters overnight  - creat improving  2.9<--, 3.4<--3.6<-3.6 <-1.1  four days ago  - phos noted, no need for binders at the moment  - cont LAsix 40 IV qd - CXR still with congestion  - no need for RRT now    AG Met acidosis - due to KIAN  - cont  NA bicarb 325 po tid    cont. betablocker and IV heparin  Avoid hypotension    Will follow

## 2018-12-25 NOTE — PROGRESS NOTE ADULT - ASSESSMENT
IMPRESSION:  Acute resp failure   most likely secondary to acute pulmonary edema   pulmonary HTN   dvt ?? PE   STEMI  KIAN   sevre AS    PLAN:    CNS: no sedation     HEENT: oral care     PULMONARY: taper fio2 keep pox . 92%     CARDIOVASCULAR: heparin drip follow PTT   follow cardiology regarding Aortic stenosis doubt the pulm Pressure accurate as per cardiology might be reflecting an MR   patient denies any symptoms before   consider lasix 40 mg Q 24 hrs i  optimize cardiac work up     GI: GI prophylaxis.  Feeding     RENAL: follow lytes , renal Us follow result renal consult   send urine lytes cr     INFECTIOUS DISEASE: no abx now will follow     HEMATOLOGICAL:  DVT prophylaxis. on heparin drip follow PTT     ENDOCRINE:  Follow up FS.  Insulin protocol if needed.    addendum note :       CRITICAL CARE TIME SPENT: ***

## 2018-12-25 NOTE — PROGRESS NOTE ADULT - SUBJECTIVE AND OBJECTIVE BOX
LENGTH OF HOSPITAL STAY: 6d    CHIEF COMPLAINT:   Patient is a 84 yo M who presents with a chief complaint of CODE STEMI.    Denies cp, sob, dizziness. On room air.       HISTORY OF PRESENTING ILLNESS:   84 y/o M with PMHx glaucoma, no cardiac hx, c/o sob on minimal exertion x2 days. Yesterday he fel as if there was a sense of congestion in his upper airways, which he would only feel if he was exerting himself. Today he was going up and down some stairs and had the same feeling, but denies any chest pain/pressure/neck or jaw pain/nausea/diaphoresis. At baseline he has no exercise limitation, denies any family hx of heart dx, has had no cardiac interventions in the past. He went to Dr Joy's office, who did EKG showing new LBBB. He was sent to [facility] ER, EKG showed same finding, CODE STEMI was called and he was transferred to [place]. Pt will likely go for cardiac cath josie.     PAST MEDICAL & SURGICAL HISTORY  PAST MEDICAL & SURGICAL HISTORY:  Glaucoma  History of appendectomy    SOCIAL HISTORY:    ALLERGIES:  No Known Allergies    MEDICATIONS:  STANDING MEDICATIONS  aspirin  chewable 81 milliGRAM(s) Oral daily  atorvastatin 40 milliGRAM(s) Oral at bedtime  chlorhexidine 4% Liquid 1 Application(s) Topical <User Schedule>  clopidogrel Tablet 75 milliGRAM(s) Oral daily  furosemide   Injectable 40 milliGRAM(s) IV Push once  heparin  Infusion 1000 Unit(s)/Hr IV Continuous <Continuous>  metoprolol tartrate 50 milliGRAM(s) Oral two times a day  sodium bicarbonate 325 milliGRAM(s) Oral three times a day  timolol 0.5% Solution 1 Drop(s) Both EYES daily    PRN MEDICATIONS    VITALS:   T(F): 97.2  HR: 70  BP: 97/50  RR: 19  SpO2: 93%    LABS:                        11.9   11.54 )-----------( 271      ( 25 Dec 2018 05:00 )             35.6     12-25    136  |  96<L>  |  85<HH>  ----------------------------<  96  4.2   |  22  |  2.9<H>    Ca    8.6      25 Dec 2018 05:00  Phos  4.9     12-25  Mg     2.5     12-25    TPro  5.9<L>  /  Alb  3.0<L>  /  TBili  0.6  /  DBili  x   /  AST  68<H>  /  ALT  208<H>  /  AlkPhos  90      PT/INR - ( 25 Dec 2018 00:39 )   PT: 16.70 sec;   INR: 1.46 ratio         PTT - ( 25 Dec 2018 05:00 )  PTT:68.7 sec  Urinalysis Basic - ( 23 Dec 2018 22:20 )    Color: Red / Appearance: Turbid / S.025 / pH: x  Gluc: x / Ketone: Negative  / Bili: Small / Urobili: 1.0 mg/dL   Blood: x / Protein: 30 mg/dL / Nitrite: Negative   Leuk Esterase: Moderate / RBC: >50 /HPF / WBC 26-50 /HPF   Sq Epi: x / Non Sq Epi: Few /HPF / Bacteria: Many /HPF                RADIOLOGY:    PHYSICAL EXAM:  GEN: no acute distress  HEENT: moist mucous membranes   LUNGS: crackles RLL  HEART: S1/S2 present, RRR  ABD: soft, nontender, nondistended, bowel sounds present  EXT: no edema  NEURO: AAOX3

## 2018-12-26 LAB
ANION GAP SERPL CALC-SCNC: 18 MMOL/L — HIGH (ref 7–14)
ANION GAP SERPL CALC-SCNC: 19 MMOL/L — HIGH (ref 7–14)
APTT BLD: 66.2 SEC — HIGH (ref 27–39.2)
APTT BLD: 75.3 SEC — CRITICAL HIGH (ref 27–39.2)
BASOPHILS # BLD AUTO: 0.02 K/UL — SIGNIFICANT CHANGE UP (ref 0–0.2)
BASOPHILS # BLD AUTO: 0.05 K/UL — SIGNIFICANT CHANGE UP (ref 0–0.2)
BASOPHILS NFR BLD AUTO: 0.2 % — SIGNIFICANT CHANGE UP (ref 0–1)
BASOPHILS NFR BLD AUTO: 0.4 % — SIGNIFICANT CHANGE UP (ref 0–1)
BLD GP AB SCN SERPL QL: SIGNIFICANT CHANGE UP
BUN SERPL-MCNC: 67 MG/DL — CRITICAL HIGH (ref 10–20)
BUN SERPL-MCNC: 72 MG/DL — CRITICAL HIGH (ref 10–20)
CALCIUM SERPL-MCNC: 8.7 MG/DL — SIGNIFICANT CHANGE UP (ref 8.5–10.1)
CALCIUM SERPL-MCNC: 8.8 MG/DL — SIGNIFICANT CHANGE UP (ref 8.5–10.1)
CHLORIDE SERPL-SCNC: 93 MMOL/L — LOW (ref 98–110)
CHLORIDE SERPL-SCNC: 96 MMOL/L — LOW (ref 98–110)
CO2 SERPL-SCNC: 22 MMOL/L — SIGNIFICANT CHANGE UP (ref 17–32)
CO2 SERPL-SCNC: 23 MMOL/L — SIGNIFICANT CHANGE UP (ref 17–32)
CREAT SERPL-MCNC: 2.1 MG/DL — HIGH (ref 0.7–1.5)
CREAT SERPL-MCNC: 2.1 MG/DL — HIGH (ref 0.7–1.5)
EOSINOPHIL # BLD AUTO: 0.04 K/UL — SIGNIFICANT CHANGE UP (ref 0–0.7)
EOSINOPHIL # BLD AUTO: 0.27 K/UL — SIGNIFICANT CHANGE UP (ref 0–0.7)
EOSINOPHIL NFR BLD AUTO: 0.3 % — SIGNIFICANT CHANGE UP (ref 0–8)
EOSINOPHIL NFR BLD AUTO: 2.2 % — SIGNIFICANT CHANGE UP (ref 0–8)
GLUCOSE SERPL-MCNC: 131 MG/DL — HIGH (ref 70–99)
GLUCOSE SERPL-MCNC: 93 MG/DL — SIGNIFICANT CHANGE UP (ref 70–99)
HCT VFR BLD CALC: 35.6 % — LOW (ref 42–52)
HCT VFR BLD CALC: 44.5 % — SIGNIFICANT CHANGE UP (ref 42–52)
HGB BLD-MCNC: 11.8 G/DL — LOW (ref 14–18)
HGB BLD-MCNC: 14.2 G/DL — SIGNIFICANT CHANGE UP (ref 14–18)
IMM GRANULOCYTES NFR BLD AUTO: 1.2 % — HIGH (ref 0.1–0.3)
IMM GRANULOCYTES NFR BLD AUTO: 1.3 % — HIGH (ref 0.1–0.3)
INR BLD: 1.43 RATIO — HIGH (ref 0.65–1.3)
INR BLD: 1.49 RATIO — HIGH (ref 0.65–1.3)
LYMPHOCYTES # BLD AUTO: 1.22 K/UL — SIGNIFICANT CHANGE UP (ref 1.2–3.4)
LYMPHOCYTES # BLD AUTO: 1.24 K/UL — SIGNIFICANT CHANGE UP (ref 1.2–3.4)
LYMPHOCYTES # BLD AUTO: 9.3 % — LOW (ref 20.5–51.1)
LYMPHOCYTES # BLD AUTO: 9.8 % — LOW (ref 20.5–51.1)
MCHC RBC-ENTMCNC: 28.2 PG — SIGNIFICANT CHANGE UP (ref 27–31)
MCHC RBC-ENTMCNC: 28.5 PG — SIGNIFICANT CHANGE UP (ref 27–31)
MCHC RBC-ENTMCNC: 31.9 G/DL — LOW (ref 32–37)
MCHC RBC-ENTMCNC: 33.1 G/DL — SIGNIFICANT CHANGE UP (ref 32–37)
MCV RBC AUTO: 85.2 FL — SIGNIFICANT CHANGE UP (ref 80–94)
MCV RBC AUTO: 89.4 FL — SIGNIFICANT CHANGE UP (ref 80–94)
MONOCYTES # BLD AUTO: 1.18 K/UL — HIGH (ref 0.1–0.6)
MONOCYTES # BLD AUTO: 1.57 K/UL — HIGH (ref 0.1–0.6)
MONOCYTES NFR BLD AUTO: 11.7 % — HIGH (ref 1.7–9.3)
MONOCYTES NFR BLD AUTO: 9.5 % — HIGH (ref 1.7–9.3)
NEUTROPHILS # BLD AUTO: 10.31 K/UL — HIGH (ref 1.4–6.5)
NEUTROPHILS # BLD AUTO: 9.57 K/UL — HIGH (ref 1.4–6.5)
NEUTROPHILS NFR BLD AUTO: 77 % — HIGH (ref 42.2–75.2)
NEUTROPHILS NFR BLD AUTO: 77.1 % — HIGH (ref 42.2–75.2)
NRBC # BLD: 0 /100 WBCS — SIGNIFICANT CHANGE UP (ref 0–0)
NRBC # BLD: 0 /100 WBCS — SIGNIFICANT CHANGE UP (ref 0–0)
PLATELET # BLD AUTO: 273 K/UL — SIGNIFICANT CHANGE UP (ref 130–400)
PLATELET # BLD AUTO: 292 K/UL — SIGNIFICANT CHANGE UP (ref 130–400)
POTASSIUM SERPL-MCNC: 4.2 MMOL/L — SIGNIFICANT CHANGE UP (ref 3.5–5)
POTASSIUM SERPL-MCNC: 4.6 MMOL/L — SIGNIFICANT CHANGE UP (ref 3.5–5)
POTASSIUM SERPL-SCNC: 4.2 MMOL/L — SIGNIFICANT CHANGE UP (ref 3.5–5)
POTASSIUM SERPL-SCNC: 4.6 MMOL/L — SIGNIFICANT CHANGE UP (ref 3.5–5)
PROTHROM AB SERPL-ACNC: 16.4 SEC — HIGH (ref 9.95–12.87)
PROTHROM AB SERPL-ACNC: 17.1 SEC — HIGH (ref 9.95–12.87)
RBC # BLD: 4.18 M/UL — LOW (ref 4.7–6.1)
RBC # BLD: 4.98 M/UL — SIGNIFICANT CHANGE UP (ref 4.7–6.1)
RBC # FLD: 13.8 % — SIGNIFICANT CHANGE UP (ref 11.5–14.5)
RBC # FLD: SIGNIFICANT CHANGE UP % (ref 11.5–14.5)
SODIUM SERPL-SCNC: 135 MMOL/L — SIGNIFICANT CHANGE UP (ref 135–146)
SODIUM SERPL-SCNC: 136 MMOL/L — SIGNIFICANT CHANGE UP (ref 135–146)
TSH SERPL-MCNC: 0.65 UIU/ML — SIGNIFICANT CHANGE UP (ref 0.27–4.2)
TYPE + AB SCN PNL BLD: SIGNIFICANT CHANGE UP
WBC # BLD: 12.41 K/UL — HIGH (ref 4.8–10.8)
WBC # BLD: 13.39 K/UL — HIGH (ref 4.8–10.8)
WBC # FLD AUTO: 12.41 K/UL — HIGH (ref 4.8–10.8)
WBC # FLD AUTO: 13.39 K/UL — HIGH (ref 4.8–10.8)

## 2018-12-26 RX ORDER — METOPROLOL TARTRATE 50 MG
50 TABLET ORAL EVERY 8 HOURS
Qty: 0 | Refills: 0 | Status: DISCONTINUED | OUTPATIENT
Start: 2018-12-26 | End: 2018-12-28

## 2018-12-26 RX ORDER — FUROSEMIDE 40 MG
40 TABLET ORAL ONCE
Qty: 0 | Refills: 0 | Status: COMPLETED | OUTPATIENT
Start: 2018-12-26 | End: 2018-12-26

## 2018-12-26 RX ORDER — APIXABAN 2.5 MG/1
2.5 TABLET, FILM COATED ORAL EVERY 12 HOURS
Qty: 0 | Refills: 0 | Status: DISCONTINUED | OUTPATIENT
Start: 2018-12-26 | End: 2018-12-28

## 2018-12-26 RX ADMIN — AMIODARONE HYDROCHLORIDE 200 MILLIGRAM(S): 400 TABLET ORAL at 06:09

## 2018-12-26 RX ADMIN — CHLORHEXIDINE GLUCONATE 1 APPLICATION(S): 213 SOLUTION TOPICAL at 12:02

## 2018-12-26 RX ADMIN — Medication 325 MILLIGRAM(S): at 21:25

## 2018-12-26 RX ADMIN — Medication 325 MILLIGRAM(S): at 06:10

## 2018-12-26 RX ADMIN — CLOPIDOGREL BISULFATE 75 MILLIGRAM(S): 75 TABLET, FILM COATED ORAL at 11:54

## 2018-12-26 RX ADMIN — APIXABAN 2.5 MILLIGRAM(S): 2.5 TABLET, FILM COATED ORAL at 17:34

## 2018-12-26 RX ADMIN — Medication 40 MILLIGRAM(S): at 17:34

## 2018-12-26 RX ADMIN — Medication 325 MILLIGRAM(S): at 14:44

## 2018-12-26 RX ADMIN — Medication 1 DROP(S): at 11:54

## 2018-12-26 RX ADMIN — Medication 50 MILLIGRAM(S): at 14:18

## 2018-12-26 RX ADMIN — Medication 81 MILLIGRAM(S): at 11:54

## 2018-12-26 RX ADMIN — Medication 40 MILLIGRAM(S): at 06:09

## 2018-12-26 RX ADMIN — Medication 50 MILLIGRAM(S): at 08:05

## 2018-12-26 RX ADMIN — ATORVASTATIN CALCIUM 40 MILLIGRAM(S): 80 TABLET, FILM COATED ORAL at 21:26

## 2018-12-26 NOTE — PROGRESS NOTE ADULT - SUBJECTIVE AND OBJECTIVE BOX
Nephrology progress note    Patient is seen and examined, events over the last 24 h noted .  No SOB  Allergies:  No Known Allergies    Hospital Medications:   MEDICATIONS  (STANDING):  apixaban 2.5 milliGRAM(s) Oral every 12 hours  aspirin  chewable 81 milliGRAM(s) Oral daily  atorvastatin 40 milliGRAM(s) Oral at bedtime  chlorhexidine 4% Liquid 1 Application(s) Topical <User Schedule>  clopidogrel Tablet 75 milliGRAM(s) Oral daily  furosemide   Injectable 40 milliGRAM(s) IV Push once  furosemide   Injectable 40 milliGRAM(s) IV Push once  furosemide   Injectable 40 milliGRAM(s) IV Push daily  metoprolol tartrate 50 milliGRAM(s) Oral every 8 hours  sodium bicarbonate 325 milliGRAM(s) Oral three times a day  timolol 0.5% Solution 1 Drop(s) Both EYES daily        VITALS:  T(F): 96.8 (18 @ 08:01), Max: 98 (18 @ 04:00)  HR: 81 (18 @ 08:01)  BP: 87/51 (18 @ 10:00)  RR: 18 (18 @ 10:00)  SpO2: 95% (18 @ 08:01)  Wt(kg): --     @ 07:01  -   @ 07:00  --------------------------------------------------------  IN: 1170 mL / OUT: 2105 mL / NET: -935 mL     @ 07:01  -   @ 07:00  --------------------------------------------------------  IN: 1400 mL / OUT: 1716 mL / NET: -316 mL     @ 07:01  -   @ 10:55  --------------------------------------------------------  IN: 230 mL / OUT: 800 mL / NET: -570 mL          PHYSICAL EXAM:  Constitutional: NAD  HEENT: anicteric sclera, oropharynx clear, MMM  Neck: No JVD  Respiratory: CTAB, no wheezes, rales or rhonchi  Cardiovascular: S1, S2, RRR  Gastrointestinal: BS+, soft, NT/ND  Extremities: No cyanosis or clubbing. No peripheral edema  Neurological: A/O x 3, no focal deficits  : No CVA tenderness. No [place].   Skin: No rashes  Vascular Access:    LABS:      136  |  96<L>  |  72<HH>  ----------------------------<  131<H>  4.2   |  22  |  2.1<H>    Ca    8.7      26 Dec 2018 05:47  Phos  4.9       Mg     2.5         TPro  5.9<L>  /  Alb  3.0<L>  /  TBili  0.6  /  DBili      /  AST  68<H>  /  ALT  208<H>  /  AlkPhos  90                            11.8   12.41 )-----------( 273      ( 26 Dec 2018 05:47 )             35.6       Urine Studies:  Urinalysis Basic - ( 23 Dec 2018 22:20 )    Color: Red / Appearance: Turbid / S.025 / pH:   Gluc:  / Ketone: Negative  / Bili: Small / Urobili: 1.0 mg/dL   Blood:  / Protein: 30 mg/dL / Nitrite: Negative   Leuk Esterase: Moderate / RBC: >50 /HPF / WBC 26-50 /HPF   Sq Epi:  / Non Sq Epi: Few /HPF / Bacteria: Many /HPF      Sodium, Random Urine: 23.0 mmoL/L ( @ 16:39)  Osmolality, Random Urine: 453 mos/kg ( @ 16:39)  Protein/Creatinine Ratio Calculation: 0.2 Ratio ( @ 16:39)  Creatinine, Random Urine: 114 mg/dL ( @ 16:39)    RADIOLOGY & ADDITIONAL STUDIES:  < from: Xray Chest 1 View- PORTABLE-Routine (12.24.18 @ 07:32) >  Minimal improvement in bilateral perihilar opacities. Trace left pleural   effusion. No pneumothorax    < end of copied text >

## 2018-12-26 NOTE — PROGRESS NOTE ADULT - ASSESSMENT
NSTEMI  moderate MR  paroxysmal atrial fibrillation  KIAN    - increase BB to 50 mg po q8h  - D/C monaco  - change coumadin to eliquis   - D/C amiodarone  - check LFTs tomorrow  - D/C planning NSTEMI  moderate MR  paroxysmal atrial fibrillation  KIAN    - increase BB to 50 mg po q8h  - D/C [place]  - change coumadin to eliquis   - D/C amiodarone  - check LFTs tomorrow  - D/C planning  - FU with Dr. Mcintosh in 3-4 weeks

## 2018-12-26 NOTE — PROGRESS NOTE ADULT - SUBJECTIVE AND OBJECTIVE BOX
Patient is a 83y old  Male who presents with a chief complaint of CODE STEMI (25 Dec 2018 12:27)      Over Night Events:  Patient seen and examined.       ROS:  See HPI    PHYSICAL EXAM    ICU Vital Signs Last 24 Hrs  T(C): 36 (26 Dec 2018 08:01), Max: 36.7 (26 Dec 2018 04:00)  T(F): 96.8 (26 Dec 2018 08:01), Max: 98 (26 Dec 2018 04:00)  HR: 81 (26 Dec 2018 08:01) (68 - 90)  BP: 112/50 (26 Dec 2018 08:01) (88/49 - 131/75)  BP(mean): 69 (26 Dec 2018 08:01) (64 - 92)  ABP: --  ABP(mean): --  RR: 18 (26 Dec 2018 08:01) (18 - 22)  SpO2: 95% (26 Dec 2018 08:01) (92% - 98%)      General:  HEENT:                Lymph Nodes: NO cervical LN   Lungs: Bilateral crackles   Cardiovascular: Regular   Abdomen: Soft, Positive BS  Extremities: No clubbing   Skin: warm   Neurological: no focal deficit   Musculoskeletal: move all ext     I&O's Detail    25 Dec 2018 07:01  -  26 Dec 2018 07:00  --------------------------------------------------------  IN:    heparin Infusion: 240 mL    Oral Fluid: 1160 mL  Total IN: 1400 mL    OUT:    Indwelling Catheter - Urethral: 1715 mL    Stool: 1 mL  Total OUT: 1716 mL    Total NET: -316 mL          LABS:                          11.8   12.41 )-----------( 273      ( 26 Dec 2018 05:47 )             35.6         26 Dec 2018 05:47    136    |  96     |  72     ----------------------------<  131    4.2     |  22     |  2.1      Ca    8.7        26 Dec 2018 05:47  Phos  4.9       25 Dec 2018 05:00  Mg     2.5       25 Dec 2018 05:00                                               PT/INR - ( 26 Dec 2018 05:47 )   PT: 16.40 sec;   INR: 1.43 ratio         PTT - ( 26 Dec 2018 05:47 )  PTT:75.3 sec                                                                                                   Culture - Urine (collected 24 Dec 2018 07:10)  Source: .Urine Clean Catch (Midstream)  Final Report (25 Dec 2018 21:29):    No growth                                                                                           MEDICATIONS  (STANDING):  amiodarone    Tablet 200 milliGRAM(s) Oral daily  aspirin  chewable 81 milliGRAM(s) Oral daily  atorvastatin 40 milliGRAM(s) Oral at bedtime  chlorhexidine 4% Liquid 1 Application(s) Topical <User Schedule>  clopidogrel Tablet 75 milliGRAM(s) Oral daily  furosemide   Injectable 40 milliGRAM(s) IV Push once  furosemide   Injectable 40 milliGRAM(s) IV Push daily  heparin  Infusion 1000 Unit(s)/Hr (10 mL/Hr) IV Continuous <Continuous>  metoprolol tartrate 50 milliGRAM(s) Oral two times a day  sodium bicarbonate 325 milliGRAM(s) Oral three times a day  timolol 0.5% Solution 1 Drop(s) Both EYES daily  warfarin 5 milliGRAM(s) Oral daily    MEDICATIONS  (PRN):          Xrays:  TLC:  OG:  ET tube:                                                                                    worsening effusion and oapcity lower    ECHO:

## 2018-12-26 NOTE — PROGRESS NOTE ADULT - ASSESSMENT
83/M with KIAN following STEMi, angioplasty, new Afib with RVR, CHF.    KIAN due to AKN or ANN - improving  - FeNa appears prerenal (cardiorenal)  - [place] cast[place] neg for hydro  - may d/c Linares, good urine output  - creat peaked at 3.6<-3.6 and is down to 2.2 (baseline 1.1)  -  phos noted  - cont LAsix 40 IV qd - CXR still with congestion    AG Met acidosis - due to KIAN  - cont  NA bicarb 325 po tid    Afib with RVR - on betablocker and IV heparin  Avoid hypotension  Will follow

## 2018-12-26 NOTE — PROGRESS NOTE ADULT - ASSESSMENT
IMPRESSION:  Acute resp failure   most likely secondary to acute pulmonary edema   pulmonary HTN   dvt ?? PE   STEMI  KIAN   sevre AS    PLAN:    CNS: no sedation     HEENT: oral care     PULMONARY: taper fio2 keep pox . 92%     CARDIOVASCULAR: heparin drip follow PTT   follow cardiology regarding Aortic stenosis doubt the pulm Pressure accurate as per cardiology might be reflecting an MR   patient denies any symptoms before    lasix 40 mg Q 12for 24 hrs   optimize cardiac work up     GI: GI prophylaxis.  Feeding     RENAL: follow lytes , renal Us follow result renal consult   send urine lytes cr     INFECTIOUS DISEASE: no abx now will follow     HEMATOLOGICAL:  DVT prophylaxis. on heparin drip follow PTT on coumadin bridge     ENDOCRINE:  Follow up FS.  Insulin protocol if needed.    addendum note :   d/c [place]   follow cardiology if ok to go tele

## 2018-12-26 NOTE — PROGRESS NOTE ADULT - SUBJECTIVE AND OBJECTIVE BOX
Denies any chest pain, SOB or palpitatins. Feeling better and sitting comfortably in a chair. No orthopnea or PND.     MEDICATIONS  (STANDING):  amiodarone    Tablet 200 milliGRAM(s) Oral daily  aspirin  chewable 81 milliGRAM(s) Oral daily  atorvastatin 40 milliGRAM(s) Oral at bedtime  chlorhexidine 4% Liquid 1 Application(s) Topical <User Schedule>  clopidogrel Tablet 75 milliGRAM(s) Oral daily  furosemide   Injectable 40 milliGRAM(s) IV Push once  furosemide   Injectable 40 milliGRAM(s) IV Push daily  furosemide   Injectable 40 milliGRAM(s) IV Push once  heparin  Infusion 1000 Unit(s)/Hr (10 mL/Hr) IV Continuous <Continuous>  metoprolol tartrate 50 milliGRAM(s) Oral two times a day  sodium bicarbonate 325 milliGRAM(s) Oral three times a day  timolol 0.5% Solution 1 Drop(s) Both EYES daily  warfarin 5 milliGRAM(s) Oral daily    MEDICATIONS  (PRN):            Vital Signs Last 24 Hrs  T(C): 36 (26 Dec 2018 08:01), Max: 36.7 (26 Dec 2018 04:00)  T(F): 96.8 (26 Dec 2018 08:01), Max: 98 (26 Dec 2018 04:00)  HR: 81 (26 Dec 2018 08:01) (68 - 90)  BP: 112/50 (26 Dec 2018 08:01) (88/49 - 131/75)  BP(mean): 69 (26 Dec 2018 08:01) (64 - 92)  RR: 18 (26 Dec 2018 08:01) (18 - 22)  SpO2: 95% (26 Dec 2018 08:01) (92% - 98%)     REVIEW OF SYSTEMS:  CONSTITUTIONAL: No fever, weight loss, or fatigue  CARDIOLOGY: PAtient denies chest pain, shortness of breath or syncopal episodes.   RESPIRATORY: denies shortness of breath, wheezeing.   NEUROLOGICAL: NO weakness, no focal deficits to report.  GI: no BRBPR, no N,V,diarrhea.    PSYCHIATRY: normal mood and affect  HEENT: no nasal discharge, no ecchymosis  SKIN: no ecchymosis, no breakdown  MUSCULOSKELETAL: Full range of motion x4.        PHYSICAL EXAM:  · CONSTITUTIONAL:	Well-developed, well nourished    BMI-  ·RESPIRATORY:   + BS bilaterally  · CARDIOVASCULAR	regular rate + LAITH  · EXTREMITIES: No cyanosis, clubbing or edema  · VASCULAR: 	Equal and normal pulses (carotid, femoral, dorsalis pedis)  	  TELEMETRY: Sinus rhythm    ECG:    TTE: Images reviewed: Normal LVF. Moderate MR. PASP around 65-70 mmHg.    LABS:                        11.8   12.41 )-----------( 273      ( 26 Dec 2018 05:47 )             35.6     12-26    136  |  96<L>  |  72<HH>  ----------------------------<  131<H>  4.2   |  22  |  2.1<H>    Ca    8.7      26 Dec 2018 05:47  Phos  4.9     12-25  Mg     2.5     12-25    TPro  5.9<L>  /  Alb  3.0<L>  /  TBili  0.6  /  DBili  x   /  AST  68<H>  /  ALT  208<H>  /  AlkPhos  90  12-25        PT/INR - ( 26 Dec 2018 05:47 )   PT: 16.40 sec;   INR: 1.43 ratio         PTT - ( 26 Dec 2018 05:47 )  PTT:75.3 sec    I&O's Summary    25 Dec 2018 07:01  -  26 Dec 2018 07:00  --------------------------------------------------------  IN: 1400 mL / OUT: 1716 mL / NET: -316 mL    26 Dec 2018 07:01  -  26 Dec 2018 09:53  --------------------------------------------------------  IN: 230 mL / OUT: 0 mL / NET: 230 mL      BNP  RADIOLOGY & ADDITIONAL STUDIES:    IMPRESSION AND PLAN:    Trend creatinine  Possible PALAK as an out patient  AC/ASA 83 EC + Plavix  IV diuresis once a day and PRN only if needed  Daily weight

## 2018-12-26 NOTE — PROGRESS NOTE ADULT - SUBJECTIVE AND OBJECTIVE BOX
SUBJ: feels better, ambulating around the unit. no chest pain, no breathing issues      MEDICATIONS  (STANDING):  amiodarone    Tablet 200 milliGRAM(s) Oral daily  aspirin  chewable 81 milliGRAM(s) Oral daily  atorvastatin 40 milliGRAM(s) Oral at bedtime  chlorhexidine 4% Liquid 1 Application(s) Topical <User Schedule>  clopidogrel Tablet 75 milliGRAM(s) Oral daily  furosemide   Injectable 40 milliGRAM(s) IV Push once  furosemide   Injectable 40 milliGRAM(s) IV Push daily  furosemide   Injectable 40 milliGRAM(s) IV Push once  heparin  Infusion 1000 Unit(s)/Hr (10 mL/Hr) IV Continuous <Continuous>  metoprolol tartrate 50 milliGRAM(s) Oral two times a day  sodium bicarbonate 325 milliGRAM(s) Oral three times a day  timolol 0.5% Solution 1 Drop(s) Both EYES daily  warfarin 5 milliGRAM(s) Oral daily    MEDICATIONS  (PRN):            Vital Signs Last 24 Hrs  T(C): 36 (26 Dec 2018 08:01), Max: 36.7 (26 Dec 2018 04:00)  T(F): 96.8 (26 Dec 2018 08:01), Max: 98 (26 Dec 2018 04:00)  HR: 81 (26 Dec 2018 08:01) (68 - 90)  BP: 112/50 (26 Dec 2018 08:01) (88/49 - 131/75)  BP(mean): 69 (26 Dec 2018 08:01) (64 - 92)  RR: 18 (26 Dec 2018 08:01) (18 - 22)  SpO2: 95% (26 Dec 2018 08:01) (92% - 98%)     REVIEW OF SYSTEMS:  CONSTITUTIONAL: No fever, weight loss, or fatigue  CARDIOLOGY: see HPI  RESPIRATORY: see HPI.   NEUROLOGICAL: NO weakness, no focal deficits to report.  ENDOCRINOLOGICAL: no recent change in diabetic medications.   GI: no BRBPR, no N,V,diarrhea.    PSYCHIATRY: normal mood and affect  HEENT: no nasal discharge, no ecchymosis  SKIN: no ecchymosis, no breakdown  MUSCULOSKELETAL: Full range of motion x4.        PHYSICAL EXAM:  · CONSTITUTIONAL:	elderly man in good spirits    BMI-  ·RESPIRAT< from: 12 Lead ECG (12.26.18 @ 07:32) >  Diagnosis Line Normal sinus rhythm  Cannot rule out Anterior infarct , age undetermined  Abnormal ECG    < end of copied text >  ORY:   airway patent; breath sounds equal; good air movement; respirations non-labored; clear to auscultation bilaterally; no chest wall tenderness; no intercostal retractions; no rales,rhonchi or wheeze  · CARDIOVASCULAR	regular rate and rhythm  no rub  no murmur  normal PMI  · EXTREMITIES: No cyanosis, clubbing or edema  · VASCULAR: 	Equal and normal pulses (carotid, femoral, dorsalis pedis)  	  TELEMETRY: no afib with aberrancy since yesterday    ECG: < from: 12 Lead ECG (12.26.18 @ 07:32) >  Diagnosis Line Normal sinus rhythm  Cannot rule out Anterior infarct , age undetermined  Abnormal ECG    < end of copied text >      TTE: < from: Transthoracic Echocardiogram (12.24.18 @ 15:20) >  1. LV Ejection Fraction by Geller's Method with a biplane EF of 73 %.   2. Normal left ventricular size and wall thicknesses, with normal   systolic function.   3. The mean global longitudinal strain by speckle tracking is -17.2%   which is borderline.   4. Mild to moderate mitral valve regurgitation.   5. Mild tricuspid regurgitation.   6. Mild aortic regurgitation.   7. Fibrocalcific AV with normal opening.   8. Pulmonic valve regurgitation.   9. Estimated pulmonary artery systolic pressure is 99.9 mmHg assuming a   right atrial pressure of 15 mmHg, which is consistent with severe   pulmonary hypertension.  10. LA volume Index is 24.4 ml/m² ml/m2.    < end of copied text >      LABS:                        11.8   12.41 )-----------( 273      ( 26 Dec 2018 05:47 )             35.6     12-26    136  |  96<L>  |  72<HH>  ----------------------------<  131<H>  4.2   |  22  |  2.1<H>    Ca    8.7      26 Dec 2018 05:47  Phos  4.9     12-25  Mg     2.5     12-25    TPro  5.9<L>  /  Alb  3.0<L>  /  TBili  0.6  /  DBili  x   /  AST  68<H>  /  ALT  208<H>  /  AlkPhos  90  12-25        PT/INR - ( 26 Dec 2018 05:47 )   PT: 16.40 sec;   INR: 1.43 ratio         PTT - ( 26 Dec 2018 05:47 )  PTT:75.3 sec    I&O's Summary    25 Dec 2018 07:01  -  26 Dec 2018 07:00  --------------------------------------------------------  IN: 1400 mL / OUT: 1716 mL / NET: -316 mL      BNP  RADIOLOGY & ADDITIONAL STUDIES:    IMPRESSION AND PLAN:

## 2018-12-26 NOTE — PROGRESS NOTE ADULT - SUBJECTIVE AND OBJECTIVE BOX
Patient is a 83y old Male who presented with a chief complaint of CODE STEMI (26 Dec 2018 10:54)  Currently admitted to medicine with the primary diagnosis of STEMI (ST elevation myocardial infarction)     Today is hospital day 7d. This morning he is resting comfortably in bed and reports no new issues or overnight events.   CCU/ICU day  Intubated:  Central Line:  Ilnares:  NG:  Drips:  Iv Lines:    PAST MEDICAL & SURGICAL HISTORY  Glaucoma  History of appendectomy    SOCIAL HISTORY:  Negative for smoking/alcohol/drug use.     ALLERGIES:  No Known Allergies    MEDICATIONS:  STANDING MEDICATIONS  apixaban 2.5 milliGRAM(s) Oral every 12 hours  aspirin  chewable 81 milliGRAM(s) Oral daily  atorvastatin 40 milliGRAM(s) Oral at bedtime  chlorhexidine 4% Liquid 1 Application(s) Topical <User Schedule>  clopidogrel Tablet 75 milliGRAM(s) Oral daily  furosemide   Injectable 40 milliGRAM(s) IV Push once  furosemide   Injectable 40 milliGRAM(s) IV Push once  furosemide   Injectable 40 milliGRAM(s) IV Push daily  metoprolol tartrate 50 milliGRAM(s) Oral every 8 hours  sodium bicarbonate 325 milliGRAM(s) Oral three times a day  timolol 0.5% Solution 1 Drop(s) Both EYES daily    PRN MEDICATIONS    Home Medications:  timolol hemihydrate 0.5% ophthalmic solution: 1 drop(s) to each affected eye 2 times a day (19 Dec 2018 19:04)    VITALS:   T(F): 97  HR: 77  BP: 105/60  RR: 18  SpO2: 95%    LABS:                        11.8   12.41 )-----------( 273      ( 26 Dec 2018 05:47 )             35.6     12-26    136  |  96<L>  |  72<HH>  ----------------------------<  131<H>  4.2   |  22  |  2.1<H>    Ca    8.7      26 Dec 2018 05:47  Phos  4.9     12-25  Mg     2.5     12-25    TPro  5.9<L>  /  Alb  3.0<L>  /  TBili  0.6  /  DBili  x   /  AST  68<H>  /  ALT  208<H>  /  AlkPhos  90  12-25    PT/INR - ( 26 Dec 2018 05:47 )   PT: 16.40 sec;   INR: 1.43 ratio         PTT - ( 26 Dec 2018 05:47 )  PTT:75.3 sec        Culture - Urine (collected 24 Dec 2018 07:10)  Source: .Urine Clean Catch (Midstream)  Final Report (25 Dec 2018 21:29):  No growth          RADIOLOGY:    PHYSICAL EXAM:  GEN: No acute distress  LUNGS: Clear to auscultation bilaterally   HEART: S1/S2 present. RRR.   ABD: Soft, non-tender, non-distended. Bowel sounds present  EXT: NC/NC/NE/2+PP/THOMAS/Skin Intact.   NEURO: AAOX3 Patient is a 83y old Male who presented with a chief complaint of CODE STEMI (26 Dec 2018 10:54)  Currently admitted to medicine with the primary diagnosis of STEMI (ST elevation myocardial infarction)     Today is hospital day 7d. This morning he is resting comfortably in bed and reports no new issues or overnight events.   CCU/ICU day: 07  Intubated: No  Central Line: No  Linares: yes  NG: No  Drips: Heparin  Iv Lines: 1    PAST MEDICAL & SURGICAL HISTORY  Glaucoma  History of appendectomy    SOCIAL HISTORY:  Negative for smoking/alcohol/drug use.     ALLERGIES:  No Known Allergies    MEDICATIONS:  STANDING MEDICATIONS  apixaban 2.5 milliGRAM(s) Oral every 12 hours  aspirin  chewable 81 milliGRAM(s) Oral daily  atorvastatin 40 milliGRAM(s) Oral at bedtime  chlorhexidine 4% Liquid 1 Application(s) Topical <User Schedule>  clopidogrel Tablet 75 milliGRAM(s) Oral daily  furosemide   Injectable 40 milliGRAM(s) IV Push once  furosemide   Injectable 40 milliGRAM(s) IV Push once  furosemide   Injectable 40 milliGRAM(s) IV Push daily  metoprolol tartrate 50 milliGRAM(s) Oral every 8 hours  sodium bicarbonate 325 milliGRAM(s) Oral three times a day  timolol 0.5% Solution 1 Drop(s) Both EYES daily    PRN MEDICATIONS    Home Medications:  timolol hemihydrate 0.5% ophthalmic solution: 1 drop(s) to each affected eye 2 times a day (19 Dec 2018 19:04)    VITALS:   T(F): 97  HR: 77  BP: 105/60  RR: 18  SpO2: 95%    LABS:                        11.8   12.41 )-----------( 273      ( 26 Dec 2018 05:47 )             35.6     12-26    136  |  96<L>  |  72<HH>  ----------------------------<  131<H>  4.2   |  22  |  2.1<H>    Ca    8.7      26 Dec 2018 05:47  Phos  4.9     12-25  Mg     2.5     12-25    TPro  5.9<L>  /  Alb  3.0<L>  /  TBili  0.6  /  DBili  x   /  AST  68<H>  /  ALT  208<H>  /  AlkPhos  90  12-25    PT/INR - ( 26 Dec 2018 05:47 )   PT: 16.40 sec;   INR: 1.43 ratio         PTT - ( 26 Dec 2018 05:47 )  PTT:75.3 sec        Culture - Urine (collected 24 Dec 2018 07:10)  Source: .Urine Clean Catch (Midstream)  Final Report (25 Dec 2018 21:29):  No growth        RADIOLOGY:  < from: Xray Chest 1 View- PORTABLE-Routine (12.26.18 @ 05:56) >  Impression:    Stable diffuse interstitial opacities and bibasilar opacities  Prominent brendon bilaterally. When the patient is able, PA and lateral view   the chest is recommended      PHYSICAL EXAM:  GEN: No acute distress  LUNGS: BL crackles  HEART: S1/S2 present. RRR.   ABD: Soft, non-tender, non-distended. Bowel sounds present  NEURO: AAOX3 Patient is a 83y old Male who presented with a chief complaint of CODE STEMI (26 Dec 2018 10:54)  Currently admitted to medicine with the primary diagnosis of STEMI (ST elevation myocardial infarction)     Today is hospital day 7d. This morning he is resting comfortably in bed and reports no new issues or overnight events.   CCU/ICU day: 07  Intubated: No  Central Line: No  Linares: yes  NG: No  Iv Lines: 1    PAST MEDICAL & SURGICAL HISTORY  Glaucoma  History of appendectomy    ALLERGIES:  No Known Allergies    MEDICATIONS:  STANDING MEDICATIONS  apixaban 2.5 milliGRAM(s) Oral every 12 hours  aspirin  chewable 81 milliGRAM(s) Oral daily  atorvastatin 40 milliGRAM(s) Oral at bedtime  chlorhexidine 4% Liquid 1 Application(s) Topical <User Schedule>  clopidogrel Tablet 75 milliGRAM(s) Oral daily  furosemide   Injectable 40 milliGRAM(s) IV Push once  furosemide   Injectable 40 milliGRAM(s) IV Push once  furosemide   Injectable 40 milliGRAM(s) IV Push daily  metoprolol tartrate 50 milliGRAM(s) Oral every 8 hours  sodium bicarbonate 325 milliGRAM(s) Oral three times a day  timolol 0.5% Solution 1 Drop(s) Both EYES daily    PRN MEDICATIONS    Home Medications:  timolol hemihydrate 0.5% ophthalmic solution: 1 drop(s) to each affected eye 2 times a day (19 Dec 2018 19:04)    VITALS:   T(F): 97  HR: 77  BP: 105/60  RR: 18  SpO2: 95%    LABS:                        11.8   12.41 )-----------( 273      ( 26 Dec 2018 05:47 )             35.6     12-26    136  |  96<L>  |  72<HH>  ----------------------------<  131<H>  4.2   |  22  |  2.1<H>    Ca    8.7      26 Dec 2018 05:47  Phos  4.9     12-25  Mg     2.5     12-25    TPro  5.9<L>  /  Alb  3.0<L>  /  TBili  0.6  /  DBili  x   /  AST  68<H>  /  ALT  208<H>  /  AlkPhos  90  12-25    PT/INR - ( 26 Dec 2018 05:47 )   PT: 16.40 sec;   INR: 1.43 ratio         PTT - ( 26 Dec 2018 05:47 )  PTT:75.3 sec        Culture - Urine (collected 24 Dec 2018 07:10)  Source: .Urine Clean Catch (Midstream)  Final Report (25 Dec 2018 21:29):  No growth        RADIOLOGY:  < from: Xray Chest 1 View- PORTABLE-Routine (12.26.18 @ 05:56) >  Impression:    Stable diffuse interstitial opacities and bibasilar opacities  Prominent brendon bilaterally. When the patient is able, PA and lateral view   the chest is recommended      PHYSICAL EXAM:  GEN: No acute distress  CHEST: B/l basal rales+  CVS: S1/S2 present. RRR. Systolic murmur+  ABD: Soft, non-tender, non-distended. Bowel sounds present  NEURO: AAOX3  EXt: no edema feet

## 2018-12-26 NOTE — PROGRESS NOTE ADULT - ASSESSMENT
82 y/o M with PMHx glaucoma, no cardiac hx, c/o sob on minimal exertion x2 days. EKG showed new onset LBBB.    #CODE STEMI-new LBBB, signs of fluid overload (on 3L nc sating 94%, +congestion on cxr, +crackles)  -Cath with Dr Craig 12/21 coronary artery disease status post PCI, stent to mid LCX on 12/21  -cw asa, plavix,   -Increased His metoprolol to 50 BID 12/23 > if HR still >100 consider increasing to 50-25-50 starting 12/24 as per cardio  -heparin gtt dced today as per cardio 12/26  -Dr Gonzalez asked to change Metoprolol 50 Q8 and change it to XL 50 OD. Started him on Eliquis 2.5 BID today 12/26 until RFTs improve then would change to 5 BID. Pt was started on Coumadin yesterday 12/25, dced today as per cardio. PALAK as an outpatient as per Cardio  -CE x 3 0.75 > 0.75 > 0.65  -on Lasix 40 Q24  -Echo 12/22 showed EF 45%, global cardiomyopathy, severe AS, Estimated pulmonary artery systolic pressure is 101.4 mmHg assuming a right atrial pressure of 15 mmHg, which is consistent with severe pulmonary hypertension.  -12/20 Pt with rate dependent LBBB, had several runs of sustained afib RVR in 150s-160s. Pt was asymptomatic, normotensive during episodes. 12 lead EKG showed afib RVR with LBBB. received amio bolus and drip.  -Repeat TTE 12/24 showed Pul pressure in 60s as per Dr Gonzalez as the official report says its 94, But pt is saturating well on room air      #KIAN due to AKN or ANN  -creat 3.5 > 2.0, was 1.2 on admission  -K 5.4 > 4.2  -Repeat bmp ordered, Nephro on board  - FeNa appears prerenal (as seen in ANN or heart failure)  - kidney sono neg for hydro 12/23  - cont Linares to monitor Is and Os  - phos 5.5 12/23  - decreased LAsix to 40 IV qd fro q 12 hrs  - no need for RRT now    #Glaucoma:   - c/w eye drops        DVT PPx: on therapeutic heparin gtt  GI PPx: Not indicated  Activity as tolerated  Diet: regular  Dispo: Home 82 y/o M with PMHx glaucoma, no cardiac hx, c/o sob on minimal exertion x2 days. EKG showed new onset LBBB.    #NSTEMI-new LBBB, with Acute Diastoli CHF with Severe Aortic stenosis, Pulmonary HTN, Afib  -CE x 3 0.75 > 0.75 > 0.65  -Cath with Dr Craig 12/21 coronary artery disease status post PCI, stent to mid LCX on 12/21  -cw asa, plavix.  -heparin gtt dced today and pt started on eliquis  -Dr Gonzalez asked to change Metoprolol 50 Q8 and change it to XL 50 OD. Started him on Eliquis 2.5 BID today 12/26 until RFTs improve then would change to 5 BID. Pt was started on Coumadin yesterday 12/25, dced today as per cardio. PALAK as an outpatient as per Cardio  -on Lasix 40 Q24. Monitor I/O, daily weight, restrict fluids  -Echo 12/22 showed EF 45%, global cardiomyopathy, severe AS, Estimated pulmonary artery systolic pressure is 101.4 mmHg assuming a right atrial pressure of 15 mmHg, which is consistent with severe pulmonary hypertension.  -12/20 Pt with rate dependent LBBB, had several runs of sustained afib RVR in 150s-160s. Pt was asymptomatic, normotensive during episodes. 12 lead EKG showed afib RVR with LBBB. received amio bolus and drip.  -Repeat TTE 12/24 showed Pul pressure in 60s as per Dr Gonzalez as the official report says its 94, But pt is saturating well on room air    #Acute Hypoxic respiratory failure sec to pulmonary  edema- resolved    #Acute Kidney Injury due to AKN or  ANN, AG metabolic acidosis  -creat 3.5 > 2.0, was 1.2 on admission  -K 5.4 > 4.2  -Repeat bmp ordered, Nephro on board  - FeNa appears prerenal (as seen in ANN or heart failure)  - kidney sono neg for hydro 12/23  - cont Linares to monitor Is and Os  - phos 5.5 12/23  - decreased LAsix to 40 IV qd fro q 12 hrs  - no need for RRT now  - cont  NA bicarb 325 po tid  - monitor BMP    #Glaucoma:   - c/w eye drops        DVT PPx: on eliquis  GI PPx: Not indicated  Activity as tolerated  Diet: regular  Dispo: Home

## 2018-12-27 ENCOUNTER — TRANSCRIPTION ENCOUNTER (OUTPATIENT)
Age: 83
End: 2018-12-27

## 2018-12-27 LAB
ALBUMIN SERPL ELPH-MCNC: 3.2 G/DL — LOW (ref 3.5–5.2)
ALP SERPL-CCNC: 95 U/L — SIGNIFICANT CHANGE UP (ref 30–115)
ALT FLD-CCNC: 168 U/L — HIGH (ref 0–41)
ANION GAP SERPL CALC-SCNC: 19 MMOL/L — HIGH (ref 7–14)
ANION GAP SERPL CALC-SCNC: 21 MMOL/L — HIGH (ref 7–14)
AST SERPL-CCNC: 72 U/L — HIGH (ref 0–41)
BASOPHILS # BLD AUTO: 0.04 K/UL — SIGNIFICANT CHANGE UP (ref 0–0.2)
BASOPHILS NFR BLD AUTO: 0.3 % — SIGNIFICANT CHANGE UP (ref 0–1)
BILIRUB SERPL-MCNC: 0.8 MG/DL — SIGNIFICANT CHANGE UP (ref 0.2–1.2)
BLD GP AB SCN SERPL QL: SIGNIFICANT CHANGE UP
BUN SERPL-MCNC: 63 MG/DL — CRITICAL HIGH (ref 10–20)
BUN SERPL-MCNC: 67 MG/DL — CRITICAL HIGH (ref 10–20)
CALCIUM SERPL-MCNC: 8.6 MG/DL — SIGNIFICANT CHANGE UP (ref 8.5–10.1)
CALCIUM SERPL-MCNC: 9.2 MG/DL — SIGNIFICANT CHANGE UP (ref 8.5–10.1)
CHLORIDE SERPL-SCNC: 92 MMOL/L — LOW (ref 98–110)
CHLORIDE SERPL-SCNC: 95 MMOL/L — LOW (ref 98–110)
CO2 SERPL-SCNC: 24 MMOL/L — SIGNIFICANT CHANGE UP (ref 17–32)
CO2 SERPL-SCNC: 24 MMOL/L — SIGNIFICANT CHANGE UP (ref 17–32)
CREAT SERPL-MCNC: 2 MG/DL — HIGH (ref 0.7–1.5)
CREAT SERPL-MCNC: 2 MG/DL — HIGH (ref 0.7–1.5)
EOSINOPHIL # BLD AUTO: 0.09 K/UL — SIGNIFICANT CHANGE UP (ref 0–0.7)
EOSINOPHIL NFR BLD AUTO: 0.7 % — SIGNIFICANT CHANGE UP (ref 0–8)
GLUCOSE SERPL-MCNC: 86 MG/DL — SIGNIFICANT CHANGE UP (ref 70–99)
GLUCOSE SERPL-MCNC: 90 MG/DL — SIGNIFICANT CHANGE UP (ref 70–99)
HCT VFR BLD CALC: 38 % — LOW (ref 42–52)
HGB BLD-MCNC: 12.4 G/DL — LOW (ref 14–18)
IMM GRANULOCYTES NFR BLD AUTO: 1.5 % — HIGH (ref 0.1–0.3)
LYMPHOCYTES # BLD AUTO: 1.49 K/UL — SIGNIFICANT CHANGE UP (ref 1.2–3.4)
LYMPHOCYTES # BLD AUTO: 11.7 % — LOW (ref 20.5–51.1)
MCHC RBC-ENTMCNC: 27.9 PG — SIGNIFICANT CHANGE UP (ref 27–31)
MCHC RBC-ENTMCNC: 32.6 G/DL — SIGNIFICANT CHANGE UP (ref 32–37)
MCV RBC AUTO: 85.6 FL — SIGNIFICANT CHANGE UP (ref 80–94)
MONOCYTES # BLD AUTO: 1.32 K/UL — HIGH (ref 0.1–0.6)
MONOCYTES NFR BLD AUTO: 10.3 % — HIGH (ref 1.7–9.3)
NEUTROPHILS # BLD AUTO: 9.65 K/UL — HIGH (ref 1.4–6.5)
NEUTROPHILS NFR BLD AUTO: 75.5 % — HIGH (ref 42.2–75.2)
NRBC # BLD: 0 /100 WBCS — SIGNIFICANT CHANGE UP (ref 0–0)
PLATELET # BLD AUTO: 292 K/UL — SIGNIFICANT CHANGE UP (ref 130–400)
POTASSIUM SERPL-MCNC: 4.4 MMOL/L — SIGNIFICANT CHANGE UP (ref 3.5–5)
POTASSIUM SERPL-MCNC: 4.5 MMOL/L — SIGNIFICANT CHANGE UP (ref 3.5–5)
POTASSIUM SERPL-SCNC: 4.4 MMOL/L — SIGNIFICANT CHANGE UP (ref 3.5–5)
POTASSIUM SERPL-SCNC: 4.5 MMOL/L — SIGNIFICANT CHANGE UP (ref 3.5–5)
PROT SERPL-MCNC: 6.3 G/DL — SIGNIFICANT CHANGE UP (ref 6–8)
RBC # BLD: 4.44 M/UL — LOW (ref 4.7–6.1)
RBC # FLD: 13.8 % — SIGNIFICANT CHANGE UP (ref 11.5–14.5)
SODIUM SERPL-SCNC: 137 MMOL/L — SIGNIFICANT CHANGE UP (ref 135–146)
SODIUM SERPL-SCNC: 138 MMOL/L — SIGNIFICANT CHANGE UP (ref 135–146)
TYPE + AB SCN PNL BLD: SIGNIFICANT CHANGE UP
WBC # BLD: 12.78 K/UL — HIGH (ref 4.8–10.8)
WBC # FLD AUTO: 12.78 K/UL — HIGH (ref 4.8–10.8)

## 2018-12-27 RX ORDER — FUROSEMIDE 40 MG
40 TABLET ORAL
Qty: 0 | Refills: 0 | Status: DISCONTINUED | OUTPATIENT
Start: 2018-12-27 | End: 2018-12-27

## 2018-12-27 RX ORDER — FUROSEMIDE 40 MG
40 TABLET ORAL DAILY
Qty: 0 | Refills: 0 | Status: DISCONTINUED | OUTPATIENT
Start: 2018-12-28 | End: 2018-12-28

## 2018-12-27 RX ADMIN — APIXABAN 2.5 MILLIGRAM(S): 2.5 TABLET, FILM COATED ORAL at 17:09

## 2018-12-27 RX ADMIN — Medication 325 MILLIGRAM(S): at 17:03

## 2018-12-27 RX ADMIN — Medication 40 MILLIGRAM(S): at 06:36

## 2018-12-27 RX ADMIN — Medication 81 MILLIGRAM(S): at 12:23

## 2018-12-27 RX ADMIN — CHLORHEXIDINE GLUCONATE 1 APPLICATION(S): 213 SOLUTION TOPICAL at 12:23

## 2018-12-27 RX ADMIN — Medication 50 MILLIGRAM(S): at 07:23

## 2018-12-27 RX ADMIN — Medication 325 MILLIGRAM(S): at 06:36

## 2018-12-27 RX ADMIN — CLOPIDOGREL BISULFATE 75 MILLIGRAM(S): 75 TABLET, FILM COATED ORAL at 12:23

## 2018-12-27 RX ADMIN — APIXABAN 2.5 MILLIGRAM(S): 2.5 TABLET, FILM COATED ORAL at 06:36

## 2018-12-27 RX ADMIN — Medication 325 MILLIGRAM(S): at 21:18

## 2018-12-27 RX ADMIN — Medication 1 DROP(S): at 17:04

## 2018-12-27 RX ADMIN — ATORVASTATIN CALCIUM 40 MILLIGRAM(S): 80 TABLET, FILM COATED ORAL at 21:17

## 2018-12-27 RX ADMIN — Medication 50 MILLIGRAM(S): at 21:17

## 2018-12-27 RX ADMIN — Medication 50 MILLIGRAM(S): at 16:54

## 2018-12-27 NOTE — DISCHARGE NOTE ADULT - PROVIDER TOKENS
TOKHENRIETTA:'[id number]',DENVER:'[id number]' TOKEN:'[id number]:'[id number]',TOKEN:'[id number]'

## 2018-12-27 NOTE — PROGRESS NOTE ADULT - SUBJECTIVE AND OBJECTIVE BOX
Patient is a 83y old Male who presented with a chief complaint of CODE STEMI (26 Dec 2018 10:54)  Currently admitted to medicine with the primary diagnosis of STEMI (ST elevation myocardial infarction)     Today is hospital day 8d.  Pt is feeling OK and denies any specific complaints, after complicated hospital stay he clinically improved and was downgraded from CCU, family by the bedside.     PAST MEDICAL & SURGICAL HISTORY  Glaucoma  History of appendectomy    ALLERGIES:  No Known Allergies    VITALS:   T(C): 36.1 (27 Dec 2018 16:00), Max: 36.7 (26 Dec 2018 20:00)  T(F): 97 (27 Dec 2018 16:00), Max: 98 (26 Dec 2018 20:00)  HR: 66 (27 Dec 2018 14:00) (66 - 78)  BP: 110/57 (27 Dec 2018 16:00) (89/46 - 116/58)  BP(mean): 77 (27 Dec 2018 16:00) (61 - 77)  RR: 16 (27 Dec 2018 16:00) (16 - 20)  SpO2: 96% (27 Dec 2018 06:00) (96% - 100%)  PHYSICAL EXAM:  GEN: No acute distress  CHEST: decreased BS b/l at bases   CVS: S1/S2 present. RRR. Systolic murmur+  ABD: Soft, non-tender, non-distended. Bowel sounds present  NEURO: AAOX3, no focal neuro deficit   EXt: no edema feet      LABS:                                       12.4   12.78 )-----------( 292      ( 27 Dec 2018 04:15 )             38.0   12-27    138  |  95<L>  |  67<HH>  ----------------------------<  86  4.4   |  24  |  2.0<H>    Ca    8.6      27 Dec 2018 04:15    TPro  6.3  /  Alb  3.2<L>  /  TBili  0.8  /  DBili  x   /  AST  72<H>  /  ALT  168<H>  /  AlkPhos  95  12-27  Prothrombin Time and INR, Plasma in AM (12.26.18 @ 05:47)    Prothrombin Time, Plasma: 16.40 sec    INR: 1.43: NO ANTICOAGULANT,NORMAL            0.65 -  1.30  ORAL ANTICOAGULANT,STD DOSE        2.00 - 3.00  MECHANICAL HEART VALVES            2.50 - 3.50  NOTE: INR RESULTS ARE INTENDED FOR USE ONLY TO  MONITOR  ORAL ANTICOAGULANT THERAPY IN STABILIZED  PATIENTS. ratio      PTT - ( 26 Dec 2018 05:47 )  PTT:75.3 sec  Culture - Urine (collected 24 Dec 2018 07:10)  Source: .Urine Clean Catch (Midstream)  Final Report (25 Dec 2018 21:29):  No growth        RADIOLOGY:  < from: Xray Chest 1 View- PORTABLE-Routine (12.26.18 @ 05:56) >  Impression:    Stable diffuse interstitial opacities and bibasilar opacities  Prominent brendon bilaterally. When the patient is able, PA and lateral view   the chest is recommended    < from: Transthoracic Echocardiogram (12.24.18 @ 15:20) >  Summary:   1. LV Ejection Fraction by Geller's Method with a biplane EF of 73 %.   2. Normal left ventricular size and wall thicknesses, with normal   systolic function.   3. The mean global longitudinal strain by speckle tracking is -17.2%   which is borderline.   4. Mild to moderate mitral valve regurgitation.   5. Mild tricuspid regurgitation.   6. Mild aortic regurgitation.   7. Fibrocalcific AV with normal opening.   8. Pulmonic valve regurgitation.   9. Estimated pulmonary artery systolic pressure is 99.9 mmHg assuming a   right atrial pressure of 15 mmHg, which is consistent with severe   pulmonary hypertension.  10. LA volume Index is 24.4 ml/m² ml/m2.    MEDICATIONS  (STANDING):  apixaban 2.5 milliGRAM(s) Oral every 12 hours  aspirin  chewable 81 milliGRAM(s) Oral daily  atorvastatin 40 milliGRAM(s) Oral at bedtime  chlorhexidine 4% Liquid 1 Application(s) Topical <User Schedule>  clopidogrel Tablet 75 milliGRAM(s) Oral daily  metoprolol tartrate 50 milliGRAM(s) Oral every 8 hours  sodium bicarbonate 325 milliGRAM(s) Oral three times a day  timolol 0.5% Solution 1 Drop(s) Both EYES daily

## 2018-12-27 NOTE — DIETITIAN INITIAL EVALUATION ADULT. - ORAL INTAKE PTA
good/Pt reports typically adequate appetite and PO intake 3 meals/day + occasional snacks. NKFA. Denies use of nutrition supplements.

## 2018-12-27 NOTE — DIETITIAN INITIAL EVALUATION ADULT. - ENERGY NEEDS
estimated calorie needs = (MSJ x 1.2-1.3).  estimated protein needs = 66-82 g/day (0.8-1.0 g/kg CBW considering KIAN).   estimated fluid needs =1mL/kcal or per CCU team

## 2018-12-27 NOTE — PROGRESS NOTE ADULT - ASSESSMENT
82 y/o M with PMHx glaucoma, no cardiac hx, c/o sob on minimal exertion x2 days. EKG showed new onset LBBB.    #NSTEMI-new LBBB, with Acute Diastoli CHF with Severe Aortic stenosis, Pulmonary HTN, Afib  -CE x 3 0.75 > 0.75 > 0.65  -Cath with Dr Craig 12/21 coronary artery disease status post PCI, stent to mid LCX on 12/21  -cw asa, plavix.  -on  Eliquis  -on  Metoprolol 50 Q8 and change it to XL 50 OD. Started  on Eliquis 2.5 BID on  12/26 until RFTs improve then would change to 5 BID.  -on Lasix 40 Q24. Monitor I/O, daily weight, restrict fluids  -Echo 12/22 showed EF 45%, global cardiomyopathy, severe AS, Estimated pulmonary artery systolic pressure is 101.4 mmHg assuming a right atrial pressure of 15 mmHg, which is consistent with severe pulmonary hypertension.  -12/20 Pt with rate dependent LBBB, had several runs of sustained afib RVR in 150s-160s. Pt was asymptomatic, normotensive during episodes. 12 lead EKG showed afib RVR with LBBB. received amio bolus and drip.  -Repeat TTE 12/24 showed Pul pressure in 60s as per Dr Gonzalez as the official report says its 94, But pt is saturating well on room air  - cardiology following     #Acute Hypoxic respiratory failure sec to pulmonary  edema- resolved    #Acute Kidney Injury due to AKN or  ANN, AG metabolic acidosis  -creat 3.5 > 2.0, was 1.2 on admission, clinically improving   -nephrology following   - monitor urine  output   - monitor BMP    #Glaucoma:   - c/w eye drops        DVT PPx: on eliquis  GI PPx: Not indicated  Activity as tolerated  Diet: regular  Dispo: anticipate discharge in 24 hours ( pt is fully functional)

## 2018-12-27 NOTE — DISCHARGE NOTE ADULT - HOSPITAL COURSE
84 y/o M with PMHx glaucoma, no cardiac hx, c/o sob on minimal exertion x2 days. He felt as if there was a sense of congestion in his upper airways, which he would only feel if he was exerting himself. Today (Day of admission) he was going up and down some stairs and had the same feeling, but denies any chest pain/pressure/neck or jaw pain/nausea/diaphoresis. At baseline he has no exercise limitation, denies any family hx of heart dx, has had no cardiac interventions in the past. He went to Dr oJy's office, who did EKG showing new LBBB. He was sent to [facility] ER, EKG showed same finding, CODE STEMI was called and he was transferred to [place]. (19 Dec 2018 22:17)    >>> STEMI> Pt was diagnosed with STEMI > CODE STEMI-new LBBB, signs of fluid overload (congestion on cxr, +crackles), CE x 3 0.75 > 0.75 > 0.65  > 12/20 Pt had rate dependent LBBB, had several runs of sustained afib RVR in 150s-160s. Pt asymptomatic, normotensive during episodes. 12 lead EKG showed afib RVR with LBBB. He was started on amio bolus and drip and later switched to po amiodarone. Amio was then stopped and changed to Eliquis 12/26 and heparin was stopped. His HR remained 130s-140s so he was started on Metoprolol which was titrated according to pt's HR to 50mg Metoprolol tartrate Q8.   > Cardiac cath was done by Dr. Craig on 12/21 which showed 100% occlusion Left circumflex artery and 80-90% stenosis LAD, patent RCA. Cobra stent was placed in LCX. His TTE 12/21 showed normal EF but PSAP 93.  > 12/21 s/p cath patient desaturated in early 80s,  He was put on High flow 50%. He was then saturating in 90s%. stat CXR was ordered which showed pulmonary congestion. ABGs showed Normal Ph, Normal pco2. Po2 in 40s. EKG unchanged from prior. Bedside USG showed diffuse B lines. Duplex lower extremities then showed left post tibial DVT. Pt was started on Heparin infusion(was stopped on 12/26 and was switched to Eliquis) and iv Lasix.  > Echo 12/22 showed EF 45%, global cardiomyopathy, severe AS, Estimated pulmonary artery systolic pressure is 101.4 mmHg assuming a right atrial pressure of 15 mmHg, which is consistent with severe pulmonary hypertension. His chest xray kept showing pulmonary congestion. His iv Lasix was continued.  > Repeat TTE 12/24 showed Pul pressure in 60s as per Dr Gonzalez, as the official report says its 94, But pt was saturating well on room air    > ON DISCHARGE > Pt advised by cardio for PALAK as out patient. Pt needs to follow up with Dr Gonzalez as out patient and Dr Arnaldo Savage-EP. Discharge pt on Metoprolol 50mg XL once a day, aspirin, plavix and increase the dose of Eliquis to 5mg BID once his creat is stable.          >>> KIAN > He presented with Creat of 1.2 which then increased to 3.6 on 12/23. He was started on iv hydration. His creat from today 12/27 is 2.0. His renal ultrasound was negative for hydronephrosis 12/23. His ABGs showed metabolic acidosis so he was started on bicarb 325 po TID as per nephro rec. 82 y/o M with PMHx glaucoma, no cardiac hx, c/o sob on minimal exertion x2 days. He felt as if there was a sense of congestion in his upper airways, which he would only feel if he was exerting himself. Today (Day of admission) he was going up and down some stairs and had the same feeling, but denies any chest pain/pressure/neck or jaw pain/nausea/diaphoresis. At baseline he has no exercise limitation, denies any family hx of heart dx, has had no cardiac interventions in the past. He went to Dr Joy's office, who did EKG showing new LBBB. He was sent to [facility] ER, EKG showed same finding, CODE STEMI was called and he was transferred to [place]. (19 Dec 2018 22:17)    >>> STEMI> Pt was diagnosed with STEMI > CODE STEMI-new LBBB, signs of fluid overload (congestion on cxr, +crackles), CE x 3 0.75 > 0.75 > 0.65  > 12/20 Pt had rate dependent LBBB, had several runs of sustained afib RVR in 150s-160s. Pt asymptomatic, normotensive during episodes. 12 lead EKG showed afib RVR with LBBB. He was started on amio bolus and drip and later switched to po amiodarone. Amio was then stopped and changed to Eliquis 12/26 and heparin was stopped. His HR remained 130s-140s so he was started on Metoprolol which was titrated according to pt's HR to 50mg Metoprolol tartrate Q8.   > Cardiac cath was done by Dr. Craig on 12/21 which showed 100% occlusion Left circumflex artery and 80-90% stenosis LAD, patent RCA. Cobra stent was placed in LCX. His TTE 12/21 showed normal EF but PSAP 93.  > 12/21 s/p cath patient desaturated in early 80s,  He was put on High flow 50%. He was then saturating in 90s%. stat CXR was ordered which showed pulmonary congestion. ABGs showed Normal Ph, Normal pco2. Po2 in 40s. EKG unchanged from prior. Bedside USG showed diffuse B lines. Duplex lower extremities then showed left post tibial DVT. Pt was started on Heparin infusion(was stopped on 12/26 and was switched to Eliquis) and iv Lasix.  > Echo 12/22 showed EF 45%, global cardiomyopathy, severe AS, Estimated pulmonary artery systolic pressure is 101.4 mmHg assuming a right atrial pressure of 15 mmHg, which is consistent with severe pulmonary hypertension. His chest xray kept showing pulmonary congestion.   > Repeat TTE 12/24 showed Pul pressure in 60s as per Dr Gonzalez, as the official report says its 94, But pt was saturating well on room air. He was transitioned to PO lasix.    >>> KIAN > He presented with Creat of 1.2 which then increased to 3.6 on 12/23. He was started on iv hydration. His creat from today 12/27 is 2.0. His renal ultrasound was negative for hydronephrosis 12/23. His ABGs showed metabolic acidosis which improved after bicarb 325 po TID as per nephro rec.    > ON DISCHARGE > Pt advised by cardio for PALAK as out patient. Pt needs to follow up with Dr Gonzalez as out patient and Dr Arnaldo Savage-EP. Discharge pt on Metoprolol 50mg XL once a day, aspirin, plavix and increase the dose of Eliquis to 5mg BID once his creat is stable.

## 2018-12-27 NOTE — DISCHARGE NOTE ADULT - ADDITIONAL INSTRUCTIONS
Follow up with Dr. Gonzalez 1 week after discharge. Follow up with Dr. Gonzalez and DEBBIE 1 week after discharge. Follow up with Dr. Gonzalez and PMD 1 week after discharge. Please follow up with nephrology as well.

## 2018-12-27 NOTE — DISCHARGE NOTE ADULT - MEDICATION SUMMARY - MEDICATIONS TO TAKE
I will START or STAY ON the medications listed below when I get home from the hospital:    aspirin 81 mg oral tablet, chewable  -- 1 tab(s) by mouth once a day  -- Indication: For CAD    lisinopril 2.5 mg oral tablet  -- 1 tab(s) by mouth once a day, hold if systolic blood pressure <100  -- Do not take this drug if you are pregnant.  It is very important that you take or use this exactly as directed.  Do not skip doses or discontinue unless directed by your doctor.  Some non-prescription drugs may aggravate your condition.  Read all labels carefully.  If a warning appears, check with your doctor before taking.    -- Indication: For CAD    nitroglycerin 0.3 mg sublingual tablet  -- 1 tab(s) sublingually every 5 minutes, As Needed -for chest pain   -- It is very important that you take or use this exactly as directed.  Do not skip doses or discontinue unless directed by your doctor.    -- Indication: For CAD    apixaban 2.5 mg oral tablet  -- 1 tab(s) by mouth every 12 hours  -- Indication: For Afib    atorvastatin 40 mg oral tablet  -- 1 tab(s) by mouth once a day (at bedtime)  -- Indication: For dvt    clopidogrel 75 mg oral tablet  -- 1 tab(s) by mouth once a day  -- Indication: For STEnt    metoprolol succinate 50 mg oral capsule, extended release  -- 1 cap(s) by mouth once a day, hold if systolic blood pressure <100  -- Do not drink alcoholic beverages when taking this medication.  It is very important that you take or use this exactly as directed.  Do not skip doses or discontinue unless directed by your doctor.  May cause drowsiness or dizziness.  Obtain medical advice before taking any non-prescription drugs as some may affect the action of this medication.  Swallow whole.  Do not crush.  This drug may impair the ability to drive or operate machinery.  Use care until you become familiar with its effects.    -- Indication: For cad    Lasix 20 mg oral tablet  -- 1 tab(s) by mouth once a day, hold i systolic blood pressure <100  -- Avoid prolonged or excessive exposure to direct and/or artificial sunlight while taking this medication.  It is very important that you take or use this exactly as directed.  Do not skip doses or discontinue unless directed by your doctor.  It may be advisable to drink a full glass orange juice or eat a banana daily while taking this medication.    -- Indication: For blood pressure    timolol hemihydrate 0.5% ophthalmic solution  -- 1 drop(s) to each affected eye 2 times a day  -- Indication: For Glaucoma

## 2018-12-27 NOTE — DISCHARGE NOTE ADULT - CARE PLAN
Principal Discharge DX:	STEMI (ST elevation myocardial infarction)  Goal:	medical therapy, prevent recurrence  Assessment and plan of treatment:	Please take all medication as directed. Follow up with cardiology 1 week after discharge. Return to hospital if you experience chest pain, shortness breath, dizziness or catheter site bleeding. Please keep low sodium and cholesterol diet, no smoking.  Secondary Diagnosis:	KIAN (acute kidney injury)  Goal:	monitor  Assessment and plan of treatment:	Please follow up with primary doctor in 1 week to recheck blood work. Principal Discharge DX:	STEMI (ST elevation myocardial infarction)  Goal:	medical therapy, prevent recurrence  Assessment and plan of treatment:	Please take all medication as directed. Follow up with cardiology 1 week after discharge. Return to hospital if you experience chest pain, shortness breath, dizziness or catheter site bleeding. Please keep low sodium and cholesterol diet, no smoking.  Secondary Diagnosis:	KIAN (acute kidney injury)  Goal:	monitor  Assessment and plan of treatment:	Please follow up with primary doctor in 1 week to recheck blood work.  Secondary Diagnosis:	Aortic stenosis  Goal:	follow up  Assessment and plan of treatment:	Please follow up with cardiology in 1 week for workup and further management. Principal Discharge DX:	STEMI (ST elevation myocardial infarction)  Goal:	medical therapy, prevent recurrence  Assessment and plan of treatment:	Please take all medication as directed. Follow up with cardiology 1 week after discharge. Return to hospital if you experience chest pain, shortness breath, dizziness or catheter site bleeding. Please keep low sodium and cholesterol diet, no smoking.  Secondary Diagnosis:	KIAN (acute kidney injury)  Goal:	monitor  Assessment and plan of treatment:	Please follow up with primary doctor in 1 week to recheck blood work, f/u with nephrology after dicharge  Secondary Diagnosis:	Aortic stenosis  Goal:	follow up  Assessment and plan of treatment:	Please follow up with cardiology in 1 week for workup and further management.

## 2018-12-27 NOTE — DISCHARGE NOTE ADULT - CARE PROVIDERS DIRECT ADDRESSES
,[email or web address] [email or web address],DirectAddress_Unknown,DirectAddress_Unknown,DirectAddress_Unknown

## 2018-12-27 NOTE — PROGRESS NOTE ADULT - SUBJECTIVE AND OBJECTIVE BOX
Cardiology Follow up    DANILO LINDSAY   83yMale  PAST MEDICAL & SURGICAL HISTORY:  Glaucoma  History of appendectomy    Allergies    No Known Allergies    Intolerances        Patient without complaints. Pt ambulated without issues/symptoms  Denies CP, SOB, palpitations, or dizziness  No events on telemetry overnight    Vital Signs Last 24 Hrs  T(C): 36.1 (27 Dec 2018 08:00), Max: 36.7 (26 Dec 2018 20:00)  T(F): 97 (27 Dec 2018 08:00), Max: 98 (26 Dec 2018 20:00)  HR: 69 (27 Dec 2018 08:00) (66 - 78)  BP: 104/58 (27 Dec 2018 08:00) (87/51 - 123/51)  BP(mean): 75 (27 Dec 2018 08:00) (59 - 75)  RR: 20 (27 Dec 2018 08:00) (18 - 20)  SpO2: 96% (27 Dec 2018 06:00) (96% - 100%)Allergies    REVIEW OF SYSTEMS:    CONSTITUTIONAL: No weakness, fevers or chills  EYES/ENT: No visual changes;  No vertigo or throat pain   NECK: No pain or stiffness  RESPIRATORY: No cough, wheezing, hemoptysis; No shortness of breath  CARDIOVASCULAR: No chest pain or palpitations  GASTROINTESTINAL: No abdominal or epigastric pain. No nausea, vomiting, or hematemesis; No diarrhea or constipation. No melena or hematochezia.  GENITOURINARY: No dysuria, frequency or hematuria  NEUROLOGICAL: No numbness or weakness  SKIN: No itching, rashes      NAD, appears well  S1S2, no murmurs, no JVD  CTA B/L, no wheeze, no rales  SNT +BS  Ext:    Right Groin:  NO hematoma,     NO bruit,; C/D/I   	   Right Radial : NO   hematoma or  bleeding, ; C/D/I      Pulses:  +Rad/ +PTs /+DPs/ same as baseline  A&Ox 3    EKG       P                                                                                                              2D ECHO   EXAM:  2-D ECHO (TTE) COMPLETE        PROCEDURE DATE:  12/24/2018      INTERPRETATION:  REPORT:    TRANSTHORACIC ECHOCARDIOGRAM REPORT         Patient Name:   DANILO LINDSAY Accession #: [id number]  Medical Rec #:  [id number]        Height:      68.0 in 172.7 cm  YOB: 1935       Weight:      178.0 lb 80.74 kg  Patient Age:    83 years         BSA:         1.95 m²  Patient Gender: M                BP:          100/60 mmHg       Date of Exam:        12/24/2018 3:20:22 PM  Referring Physician: [id number] ED UNASSIGNED  Sonographer:         Laura Carlos  Reading Physician:   Latesha Murphy MD.    Procedure:   2D Echo/Doppler/Color Doppler Complete.  Indications: I21.3 - ST Elevation STEMI Myocardial Infarction of   unspecified  Diagnosis:   I21.3 - ST Elevation STEMI Myocardial Infarction of   unspecifie         Summary:   1. LV Ejection Fraction by Geller's Method with a biplane EF of 73 %.   2. Normal left ventricular size and wall thicknesses, with normal   systolic function.   3. The mean global longitudinal strain by speckle tracking is -17.2%   which is borderline.   4. Mild to moderate mitral valve regurgitation.   5. Mild tricuspid regurgitation.   6. Mild aortic regurgitation.   7. Fibrocalcific AV with normal opening.   8. Pulmonic valve regurgitation.   9. Estimated pulmonary artery systolic pressure is 99.9 mmHg assuming a   right atrial pressure of 15 mmHg, which is consistent with severe   pulmonary hypertension.  10. LA volume Index is 24.4 ml/m² ml/m2.    PHYSICIAN INTERPRETATION:  Left Ventricle: Normal left ventricular size and wall thicknesses, with   normal systolic function. There is no left ventricular hypertrophy. The   mean global longitudinal strain by speckle tracking is -17.2% which is   borderline. LV Ejection Fraction by Geller's Method with a biplane EF of   73 %.  Right Ventricle: Normal right ventricular size and function.  Left Atrium: Normal left atrial size. LA volume Index is 24.4 ml/m² ml/m2.  Right Atrium: Normal right atrial size.  Pericardium: There is no evidence of pericardial effusion.  Mitral Valve: Structurally normal mitral valve, with normal leaflet   excursion. Mild to moderate mitral valve regurgitation is seen. Peak   transmitral mean gradient equals2.6 mmHg, calculated mitral valve area   by pressure half time equals 4.33 cm² consistent with No evidence of   mitral stenosis.  Tricuspid Valve: Structurally normal tricuspid valve, with normal leaflet   excursion. Mild tricuspid regurgitation is visualized. Estimated   pulmonary artery systolic pressure is 99.9 mmHg assuming a right atrial   pressure of 15 mmHg, which is consistent with severe pulmonary   hypertension.  Aortic Valve: Normal trileaflet aortic valve with normal opening. Mild   aortic valve regurgitation is seen. Peak Av velocity is 1.34 m/s, mean   transaortic gradient equals 3.7 mmHg. Fibrocalcific AV with normal   opening.  Pulmonic Valve: Structurally normal pulmonic valve, with normal leaflet   excursion. Mild pulmonic valve regurgitation.  Aorta: The aortic root and ascending aorta are structurally normal, with   no evidence of dilitation.  Pulmonary Artery: The main pulmonary artery is normal in size.  Venous: The inferior vena cava was dilated, with respiratory size   variation less than 50%.     2D AND M-MODE MEASUREMENTS (normal ranges within parentheses):  Left                 Normal    Aorta/Left           Normal  Ventricle:                     Atrium:  IVSd (2D):  1.68 cm  (0.7-1.1) AoV Cusp      1.72   (1.5-2.6)  LVPWd (2D): 1.10 cm  (0.7-1.1) Separation:   cm  LVIDd (2D): 4.55 cm  (3.4-5.7) Left Atrium   4.51   (1.9-4.0)  LVIDs (2D): 3.17 cm            (Mmode):      cm  LV FS (2D): 30.4 %   (>25%)    LA Volume     24.4  IVSd        1.88 cm  (0.7-1.1) Index         ml/m²  (Mmode):                       Right  LVPWd       1.12 cm  (0.7-1.1) Ventricle:  (Mmode):                       RVd (2D):      3.01 cm  LVIDd       4.68 cm  (3.4-5.7) TAPSE:         1.90 cm  (Mmode):  LVIDs       3.28 cm  (Mmode):  LV FS       29.9 %   (>25%)  (Mmode):  Relative    0.48     (<0.42)  Wall  Thickness  Rel. Wall   0.48     (<0.42)  Thickness  Mm  LV Mass     149.7  Index:      g/m²  Mmode    SPECTRAL DOPPLER ANALYSIS:  LV DIASTOLIC FUNCTION:  MV Peak E: 1.45 m/s Decel Time: 175 msec  MV Peak A: 0.89 m/s  E/A Ratio: 1.63    Aortic Valve:  AoV VMax:    1.34 m/s AoV Area, Vmax:       2.12 cm² Vmax Indx:    1.09   cm²/m²  AoV VTI:     0.24 m   AoV Area, VTI:        1.91 cm² VTI Indx:     0.98   cm²/m²  AoV Pk Grad: 7.2 mmHg AoV Area, Mn Grad:    2.13 cm² Mn Grad Indx: 1.10   cm²/m²  AoV Mn Grad: 3.7 mmHg AoV Area, Planimetry: 1.72 cm²    LVOT Vmax: 0.74 m/s  LVOT VTI:  0.12 m  LVOT Diam: 2.21 cm    Aortic Insufficiency:  AI Half-time:  473 msec  AI Decel Rate: 2.19 m/s²    Mitral Valve:  MV VMax:    1.48 m/s MV P1/2 Time: 50.82 msec  MV Mn Grad: 2.6 mmHg MV Area, PHT: 4.33 cm²    Tricuspid Valve and PA/RV Systolic Pressure: TR Max Velocity: 4.61 m/s RA   Pressure: 15 mmHg RVSP/PASP: 99.9 mmHg    Pulmonic Valve:  PV Max Velocity: 0.96 m/s PV Max PG: 3.6 mmHg PV Mean PG:       [id number] Latesha Murphy MD, Electronically signed on 12/25/2018 at   8:45:50 AM         *** Final ***                    LATESHA MURPHY MD  This document has been electronically signed. Dec 24 2018  3:20PM                  LABS                        12.4   12.78 )-----------( 292      ( 27 Dec 2018 04:15 )             38.0     12-27    138  |  95<L>  |  67<HH>  ----------------------------<  86  4.4   |  24  |  2.0<H>    Ca    8.6      27 Dec 2018 04:15    TPro  6.3  /  Alb  3.2<L>  /  TBili  0.8  /  DBili  x   /  AST  72<H>  /  ALT  168<H>  /  AlkPhos  95  12-27        LIVER FUNCTIONS - ( 27 Dec 2018 04:15 )  Alb: 3.2 g/dL / Pro: 6.3 g/dL / ALK PHOS: 95 U/L / ALT: 168 U/L / AST: 72 U/L / GGT: x               IMPRESSION AND PLAN:    Trend creatinine, renal following   Possible PALAK as an out patient  AC/ASA 81 EC + Plavix  IV diuresis once a day and PRN only if needed  Daily weight, monitor I/O's   Pt given instructions on importance of taking antiplatelet medication or risk acute stent thrombosis/death    Post cath instructions, access site care and activity restrictions reviewed with patient      Discussed with patient to return to hospital if experience chest pain, shortness breath, dizziness and site bleeding    Aggressive risk factor modification,  diet counseling, smoking cessation discussed with patient       follow up with Dr. Gonzalez regarding disposition      Follow up with Cardiology Dr. Gonzalez  in one week after discharge . Instructed to call and make an appointment Cardiology Follow up    DANILO LINDSAY   83yMale  PAST MEDICAL & SURGICAL HISTORY:  Glaucoma  History of appendectomy    Allergies    No Known Allergies    Intolerances        Patient without complaints. Pt ambulated without issues/symptoms  Denies CP, SOB, palpitations, or dizziness  No events on telemetry overnight    Vital Signs Last 24 Hrs  T(C): 36.1 (27 Dec 2018 08:00), Max: 36.7 (26 Dec 2018 20:00)  T(F): 97 (27 Dec 2018 08:00), Max: 98 (26 Dec 2018 20:00)  HR: 69 (27 Dec 2018 08:00) (66 - 78)  BP: 104/58 (27 Dec 2018 08:00) (87/51 - 123/51)  BP(mean): 75 (27 Dec 2018 08:00) (59 - 75)  RR: 20 (27 Dec 2018 08:00) (18 - 20)  SpO2: 96% (27 Dec 2018 06:00) (96% - 100%)Allergies    REVIEW OF SYSTEMS:    CONSTITUTIONAL: No weakness, fevers or chills  EYES/ENT: No visual changes;  No vertigo or throat pain   NECK: No pain or stiffness  RESPIRATORY: No cough, wheezing, hemoptysis; No shortness of breath  CARDIOVASCULAR: No chest pain or palpitations  GASTROINTESTINAL: No abdominal or epigastric pain. No nausea, vomiting, or hematemesis; No diarrhea or constipation. No melena or hematochezia.  GENITOURINARY: No dysuria, frequency or hematuria  NEUROLOGICAL: No numbness or weakness  SKIN: No itching, rashes      NAD, appears well  S1S2, no murmurs, no JVD  CTA B/L, no wheeze, no rales  SNT +BS  Ext:    Right Groin:  NO hematoma,     NO bruit,; C/D/I   	   Right Radial : NO   hematoma or  bleeding, ; C/D/I      Pulses:  +Rad/ +PTs /+DPs/ same as baseline  A&Ox 3    EKG       P                                                                                                              2D ECHO   EXAM:  2-D ECHO (TTE) COMPLETE        PROCEDURE DATE:  12/24/2018      INTERPRETATION:  REPORT:    TRANSTHORACIC ECHOCARDIOGRAM REPORT         Patient Name:   DANILO LINDSAY Accession #: [id number]  Medical Rec #:  [id number]        Height:      68.0 in 172.7 cm  YOB: 1935       Weight:      178.0 lb 80.74 kg  Patient Age:    83 years         BSA:         1.95 m²  Patient Gender: M                BP:          100/60 mmHg       Date of Exam:        12/24/2018 3:20:22 PM  Referring Physician: [id number] ED UNASSIGNED  Sonographer:         Laura Carlos  Reading Physician:   Latesha Murphy MD.    Procedure:   2D Echo/Doppler/Color Doppler Complete.  Indications: I21.3 - ST Elevation STEMI Myocardial Infarction of   unspecified  Diagnosis:   I21.3 - ST Elevation STEMI Myocardial Infarction of   unspecifie         Summary:   1. LV Ejection Fraction by Geller's Method with a biplane EF of 73 %.   2. Normal left ventricular size and wall thicknesses, with normal   systolic function.   3. The mean global longitudinal strain by speckle tracking is -17.2%   which is borderline.   4. Mild to moderate mitral valve regurgitation.   5. Mild tricuspid regurgitation.   6. Mild aortic regurgitation.   7. Fibrocalcific AV with normal opening.   8. Pulmonic valve regurgitation.   9. Estimated pulmonary artery systolic pressure is 99.9 mmHg assuming a   right atrial pressure of 15 mmHg, which is consistent with severe   pulmonary hypertension.  10. LA volume Index is 24.4 ml/m² ml/m2.    PHYSICIAN INTERPRETATION:  Left Ventricle: Normal left ventricular size and wall thicknesses, with   normal systolic function. There is no left ventricular hypertrophy. The   mean global longitudinal strain by speckle tracking is -17.2% which is   borderline. LV Ejection Fraction by Geller's Method with a biplane EF of   73 %.  Right Ventricle: Normal right ventricular size and function.  Left Atrium: Normal left atrial size. LA volume Index is 24.4 ml/m² ml/m2.  Right Atrium: Normal right atrial size.  Pericardium: There is no evidence of pericardial effusion.  Mitral Valve: Structurally normal mitral valve, with normal leaflet   excursion. Mild to moderate mitral valve regurgitation is seen. Peak   transmitral mean gradient equals2.6 mmHg, calculated mitral valve area   by pressure half time equals 4.33 cm² consistent with No evidence of   mitral stenosis.  Tricuspid Valve: Structurally normal tricuspid valve, with normal leaflet   excursion. Mild tricuspid regurgitation is visualized. Estimated   pulmonary artery systolic pressure is 99.9 mmHg assuming a right atrial   pressure of 15 mmHg, which is consistent with severe pulmonary   hypertension.  Aortic Valve: Normal trileaflet aortic valve with normal opening. Mild   aortic valve regurgitation is seen. Peak Av velocity is 1.34 m/s, mean   transaortic gradient equals 3.7 mmHg. Fibrocalcific AV with normal   opening.  Pulmonic Valve: Structurally normal pulmonic valve, with normal leaflet   excursion. Mild pulmonic valve regurgitation.  Aorta: The aortic root and ascending aorta are structurally normal, with   no evidence of dilitation.  Pulmonary Artery: The main pulmonary artery is normal in size.  Venous: The inferior vena cava was dilated, with respiratory size   variation less than 50%.     2D AND M-MODE MEASUREMENTS (normal ranges within parentheses):  Left                 Normal    Aorta/Left           Normal  Ventricle:                     Atrium:  IVSd (2D):  1.68 cm  (0.7-1.1) AoV Cusp      1.72   (1.5-2.6)  LVPWd (2D): 1.10 cm  (0.7-1.1) Separation:   cm  LVIDd (2D): 4.55 cm  (3.4-5.7) Left Atrium   4.51   (1.9-4.0)  LVIDs (2D): 3.17 cm            (Mmode):      cm  LV FS (2D): 30.4 %   (>25%)    LA Volume     24.4  IVSd        1.88 cm  (0.7-1.1) Index         ml/m²  (Mmode):                       Right  LVPWd       1.12 cm  (0.7-1.1) Ventricle:  (Mmode):                       RVd (2D):      3.01 cm  LVIDd       4.68 cm  (3.4-5.7) TAPSE:         1.90 cm  (Mmode):  LVIDs       3.28 cm  (Mmode):  LV FS       29.9 %   (>25%)  (Mmode):  Relative    0.48     (<0.42)  Wall  Thickness  Rel. Wall   0.48     (<0.42)  Thickness  Mm  LV Mass     149.7  Index:      g/m²  Mmode    SPECTRAL DOPPLER ANALYSIS:  LV DIASTOLIC FUNCTION:  MV Peak E: 1.45 m/s Decel Time: 175 msec  MV Peak A: 0.89 m/s  E/A Ratio: 1.63    Aortic Valve:  AoV VMax:    1.34 m/s AoV Area, Vmax:       2.12 cm² Vmax Indx:    1.09   cm²/m²  AoV VTI:     0.24 m   AoV Area, VTI:        1.91 cm² VTI Indx:     0.98   cm²/m²  AoV Pk Grad: 7.2 mmHg AoV Area, Mn Grad:    2.13 cm² Mn Grad Indx: 1.10   cm²/m²  AoV Mn Grad: 3.7 mmHg AoV Area, Planimetry: 1.72 cm²    LVOT Vmax: 0.74 m/s  LVOT VTI:  0.12 m  LVOT Diam: 2.21 cm    Aortic Insufficiency:  AI Half-time:  473 msec  AI Decel Rate: 2.19 m/s²    Mitral Valve:  MV VMax:    1.48 m/s MV P1/2 Time: 50.82 msec  MV Mn Grad: 2.6 mmHg MV Area, PHT: 4.33 cm²    Tricuspid Valve and PA/RV Systolic Pressure: TR Max Velocity: 4.61 m/s RA   Pressure: 15 mmHg RVSP/PASP: 99.9 mmHg    Pulmonic Valve:  PV Max Velocity: 0.96 m/s PV Max PG: 3.6 mmHg PV Mean PG:       [id number] Latesha Murphy MD, Electronically signed on 12/25/2018 at   8:45:50 AM         *** Final ***                    LATESHA MURPHY MD  This document has been electronically signed. Dec 24 2018  3:20PM                  LABS                        12.4   12.78 )-----------( 292      ( 27 Dec 2018 04:15 )             38.0     12-27    138  |  95<L>  |  67<HH>  ----------------------------<  86  4.4   |  24  |  2.0<H>    Ca    8.6      27 Dec 2018 04:15    TPro  6.3  /  Alb  3.2<L>  /  TBili  0.8  /  DBili  x   /  AST  72<H>  /  ALT  168<H>  /  AlkPhos  95  12-27        LIVER FUNCTIONS - ( 27 Dec 2018 04:15 )  Alb: 3.2 g/dL / Pro: 6.3 g/dL / ALK PHOS: 95 U/L / ALT: 168 U/L / AST: 72 U/L / GGT: x               IMPRESSION AND PLAN:    creatinine trending down , renal following   Possible PALAK as an out patient  AC/ASA 81 EC + Plavix  IV diuresis once a day and PRN only if needed  Daily weight, monitor I/O's   Pt given instructions on importance of taking antiplatelet medication or risk acute stent thrombosis/death    Post cath instructions, access site care and activity restrictions reviewed with patient      Discussed with patient to return to hospital if experience chest pain, shortness breath, dizziness and site bleeding    Aggressive risk factor modification,  diet counseling, smoking cessation discussed with patient        downgrade to tele as per Dr. Gonzalez     Follow up with Cardiology Dr. Gonzalez  in one week after discharge . Instructed to call and make an appointment Cardiology Follow up    DANILO LINDSAY   83yMale  PAST MEDICAL & SURGICAL HISTORY:  Glaucoma  History of appendectomy    Allergies    No Known Allergies    Intolerances        Patient without complaints. Pt ambulated without issues/symptoms  Denies CP, SOB, palpitations, or dizziness  No events on telemetry overnight    Vital Signs Last 24 Hrs  T(C): 36.1 (27 Dec 2018 08:00), Max: 36.7 (26 Dec 2018 20:00)  T(F): 97 (27 Dec 2018 08:00), Max: 98 (26 Dec 2018 20:00)  HR: 69 (27 Dec 2018 08:00) (66 - 78)  BP: 104/58 (27 Dec 2018 08:00) (87/51 - 123/51)  BP(mean): 75 (27 Dec 2018 08:00) (59 - 75)  RR: 20 (27 Dec 2018 08:00) (18 - 20)  SpO2: 96% (27 Dec 2018 06:00) (96% - 100%)Allergies    REVIEW OF SYSTEMS:    CONSTITUTIONAL: No weakness, fevers or chills  EYES/ENT: No visual changes;  No vertigo or throat pain   NECK: No pain or stiffness  RESPIRATORY: No cough, wheezing, hemoptysis; No shortness of breath  CARDIOVASCULAR: No chest pain or palpitations  GASTROINTESTINAL: No abdominal or epigastric pain. No nausea, vomiting, or hematemesis; No diarrhea or constipation. No melena or hematochezia.  GENITOURINARY: No dysuria, frequency or hematuria  NEUROLOGICAL: No numbness or weakness  SKIN: No itching, rashes      NAD, appears well  S1S2, no murmurs, no JVD  CTA B/L, no wheeze, no rales  SNT +BS  Ext:    Right Groin:  NO hematoma,     NO bruit,; C/D/I   	   Right Radial : NO   hematoma or  bleeding, ; C/D/I      Pulses:  +Rad/ +PTs /+DPs/ same as baseline  A&Ox 3    EKG       P                                                                                                              2D ECHO   EXAM:  2-D ECHO (TTE) COMPLETE        PROCEDURE DATE:  12/24/2018      INTERPRETATION:  REPORT:    TRANSTHORACIC ECHOCARDIOGRAM REPORT         Patient Name:   DANILO LINDSAY Accession #: [id number]  Medical Rec #:  [id number]        Height:      68.0 in 172.7 cm  YOB: 1935       Weight:      178.0 lb 80.74 kg  Patient Age:    83 years         BSA:         1.95 m²  Patient Gender: M                BP:          100/60 mmHg       Date of Exam:        12/24/2018 3:20:22 PM  Referring Physician: [id number] ED UNASSIGNED  Sonographer:         Laura Carlos  Reading Physician:   Juan Jose Murphy MD.    Procedure:   2D Echo/Doppler/Color Doppler Complete.  Indications: I21.3 - ST Elevation STEMI Myocardial Infarction of   unspecified  Diagnosis:   I21.3 - ST Elevation STEMI Myocardial Infarction of   unspecifie         Summary:   1. LV Ejection Fraction by Geller's Method with a biplane EF of 73 %.   2. Normal left ventricular size and wall thicknesses, with normal   systolic function.   3. The mean global longitudinal strain by speckle tracking is -17.2%   which is borderline.   4. Mild to moderate mitral valve regurgitation.   5. Mild tricuspid regurgitation.   6. Mild aortic regurgitation.   7. Fibrocalcific AV with normal opening.   8. Pulmonic valve regurgitation.   9. Estimated pulmonary artery systolic pressure is 99.9 mmHg assuming a   right atrial pressure of 15 mmHg, which is consistent with severe   pulmonary hypertension.  10. LA volume Index is 24.4 ml/m² ml/m2.    PHYSICIAN INTERPRETATION:  Left Ventricle: Normal left ventricular size and wall thicknesses, with   normal systolic function. There is no left ventricular hypertrophy. The   mean global longitudinal strain by speckle tracking is -17.2% which is   borderline. LV Ejection Fraction by Geller's Method with a biplane EF of   73 %.  Right Ventricle: Normal right ventricular size and function.  Left Atrium: Normal left atrial size. LA volume Index is 24.4 ml/m² ml/m2.  Right Atrium: Normal right atrial size.  Pericardium: There is no evidence of pericardial effusion.  Mitral Valve: Structurally normal mitral valve, with normal leaflet   excursion. Mild to moderate mitral valve regurgitation is seen. Peak   transmitral mean gradient equals2.6 mmHg, calculated mitral valve area   by pressure half time equals 4.33 cm² consistent with No evidence of   mitral stenosis.  Tricuspid Valve: Structurally normal tricuspid valve, with normal leaflet   excursion. Mild tricuspid regurgitation is visualized. Estimated   pulmonary artery systolic pressure is 99.9 mmHg assuming a right atrial   pressure of 15 mmHg, which is consistent with severe pulmonary   hypertension.  Aortic Valve: Normal trileaflet aortic valve with normal opening. Mild   aortic valve regurgitation is seen. Peak Av velocity is 1.34 m/s, mean   transaortic gradient equals 3.7 mmHg. Fibrocalcific AV with normal   opening.  Pulmonic Valve: Structurally normal pulmonic valve, with normal leaflet   excursion. Mild pulmonic valve regurgitation.  Aorta: The aortic root and ascending aorta are structurally normal, with   no evidence of dilitation.  Pulmonary Artery: The main pulmonary artery is normal in size.  Venous: The inferior vena cava was dilated, with respiratory size   variation less than 50%.     2D AND M-MODE MEASUREMENTS (normal ranges within parentheses):  Left                 Normal    Aorta/Left           Normal  Ventricle:                     Atrium:  IVSd (2D):  1.68 cm  (0.7-1.1) AoV Cusp      1.72   (1.5-2.6)  LVPWd (2D): 1.10 cm  (0.7-1.1) Separation:   cm  LVIDd (2D): 4.55 cm  (3.4-5.7) Left Atrium   4.51   (1.9-4.0)  LVIDs (2D): 3.17 cm            (Mmode):      cm  LV FS (2D): 30.4 %   (>25%)    LA Volume     24.4  IVSd        1.88 cm  (0.7-1.1) Index         ml/m²  (Mmode):                       Right  LVPWd       1.12 cm  (0.7-1.1) Ventricle:  (Mmode):                       RVd (2D):      3.01 cm  LVIDd       4.68 cm  (3.4-5.7) TAPSE:         1.90 cm  (Mmode):  LVIDs       3.28 cm  (Mmode):  LV FS       29.9 %   (>25%)  (Mmode):  Relative    0.48     (<0.42)  Wall  Thickness  Rel. Wall   0.48     (<0.42)  Thickness  Mm  LV Mass     149.7  Index:      g/m²  Mmode    SPECTRAL DOPPLER ANALYSIS:  LV DIASTOLIC FUNCTION:  MV Peak E: 1.45 m/s Decel Time: 175 msec  MV Peak A: 0.89 m/s  E/A Ratio: 1.63    Aortic Valve:  AoV VMax:    1.34 m/s AoV Area, Vmax:       2.12 cm² Vmax Indx:    1.09   cm²/m²  AoV VTI:     0.24 m   AoV Area, VTI:        1.91 cm² VTI Indx:     0.98   cm²/m²  AoV Pk Grad: 7.2 mmHg AoV Area, Mn Grad:    2.13 cm² Mn Grad Indx: 1.10   cm²/m²  AoV Mn Grad: 3.7 mmHg AoV Area, Planimetry: 1.72 cm²    LVOT Vmax: 0.74 m/s  LVOT VTI:  0.12 m  LVOT Diam: 2.21 cm    Aortic Insufficiency:  AI Half-time:  473 msec  AI Decel Rate: 2.19 m/s²    Mitral Valve:  MV VMax:    1.48 m/s MV P1/2 Time: 50.82 msec  MV Mn Grad: 2.6 mmHg MV Area, PHT: 4.33 cm²    Tricuspid Valve and PA/RV Systolic Pressure: TR Max Velocity: 4.61 m/s RA   Pressure: 15 mmHg RVSP/PASP: 99.9 mmHg    Pulmonic Valve:  PV Max Velocity: 0.96 m/s PV Max PG: 3.6 mmHg PV Mean PG:       [id number] Juan Jose Murphy MD, Electronically signed on 12/25/2018 at   8:45:50 AM         *** Final ***                          LABS                        12.4   12.78 )-----------( 292      ( 27 Dec 2018 04:15 )             38.0     12-27    138  |  95<L>  |  67<HH>  ----------------------------<  86  4.4   |  24  |  2.0<H>    Ca    8.6      27 Dec 2018 04:15    TPro  6.3  /  Alb  3.2<L>  /  TBili  0.8  /  DBili  x   /  AST  72<H>  /  ALT  168<H>  /  AlkPhos  95  12-27        LIVER FUNCTIONS - ( 27 Dec 2018 04:15 )  Alb: 3.2 g/dL / Pro: 6.3 g/dL / ALK PHOS: 95 U/L / ALT: 168 U/L / AST: 72 U/L / GGT: x               IMPRESSION AND PLAN:    creatinine trending down , renal following   Possible PALAK as an out patient  AC/ASA 81 EC + Plavix  IV diuresis once a day and PRN only if needed  Daily weight, monitor I/O's   Pt given instructions on importance of taking antiplatelet medication or risk acute stent thrombosis/death    Post cath instructions, access site care and activity restrictions reviewed with patient      Discussed with patient to return to hospital if experience chest pain, shortness breath, dizziness and site bleeding    Aggressive risk factor modification,  diet counseling, smoking cessation discussed with patient        downgrade to tele as per Dr. Gonzalez

## 2018-12-27 NOTE — DIETITIAN INITIAL EVALUATION ADULT. - DIET TYPE
DASH/TLC (sodium and cholesterol restricted diet)/Pt reports adequate appetite and consuming >75% meals and snacks since admission. DASH/TLC diet ed provided to both pt and pts son. See RD education note in adult POC flowsheet. No further nutrition intervention at this time.

## 2018-12-27 NOTE — PROGRESS NOTE ADULT - SUBJECTIVE AND OBJECTIVE BOX
Patient is a 83y old  Male who presents with a chief complaint of CODE STEMI (27 Dec 2018 09:26)      Over Night Events:  Patient seen and examined feel better czxr improved after lasix Q 12 hrs yesterday       ROS:  See HPI    PHYSICAL EXAM    ICU Vital Signs Last 24 Hrs  T(C): 36.1 (27 Dec 2018 08:00), Max: 36.7 (26 Dec 2018 20:00)  T(F): 97 (27 Dec 2018 08:00), Max: 98 (26 Dec 2018 20:00)  HR: 69 (27 Dec 2018 08:00) (66 - 78)  BP: 104/58 (27 Dec 2018 08:00) (87/51 - 123/51)  BP(mean): 75 (27 Dec 2018 08:00) (59 - 75)  ABP: --  ABP(mean): --  RR: 20 (27 Dec 2018 08:00) (18 - 20)  SpO2: 96% (27 Dec 2018 06:00) (96% - 100%)      General:aOX3  HEENT:   MARCELINO         Lymph Nodes: NO cervical LN   Lungs: Bilateral BS  Cardiovascular: Regular   Abdomen: Soft, Positive BS  Extremities: No clubbing   Skin: WARM  Neurological: NO FOCAL DEFICIT    move all ext     I&O's Detail    26 Dec 2018 07:01  -  27 Dec 2018 07:00  --------------------------------------------------------  IN:    heparin Infusion: 30 mL    Oral Fluid: 1000 mL  Total IN: 1030 mL    OUT:    Indwelling Catheter - Urethral: 800 mL    Voided: 1400 mL  Total OUT: 2200 mL    Total NET: -1170 mL          LABS:                          12.4   12.78 )-----------( 292      ( 27 Dec 2018 04:15 )             38.0         27 Dec 2018 04:15    138    |  95     |  67     ----------------------------<  86     4.4     |  24     |  2.0      Ca    8.6        27 Dec 2018 04:15    TPro  6.3    /  Alb  3.2    /  TBili  0.8    /  DBili  x      /  AST  72     /  ALT  168    /  AlkPhos  95     27 Dec 2018 04:15  Amylase x     lipase x                                                 PT/INR - ( 26 Dec 2018 05:47 )   PT: 16.40 sec;   INR: 1.43 ratio         PTT - ( 26 Dec 2018 05:47 )  PTT:75.3 sec                                                                                                                                                                                        MEDICATIONS  (STANDING):  apixaban 2.5 milliGRAM(s) Oral every 12 hours  aspirin  chewable 81 milliGRAM(s) Oral daily  atorvastatin 40 milliGRAM(s) Oral at bedtime  chlorhexidine 4% Liquid 1 Application(s) Topical <User Schedule>  clopidogrel Tablet 75 milliGRAM(s) Oral daily  furosemide   Injectable 40 milliGRAM(s) IV Push once  furosemide   Injectable 40 milliGRAM(s) IV Push daily  metoprolol tartrate 50 milliGRAM(s) Oral every 8 hours  sodium bicarbonate 325 milliGRAM(s) Oral three times a day  timolol 0.5% Solution 1 Drop(s) Both EYES daily    MEDICATIONS  (PRN):          Xrays:  TLC:  OG:  ET tube:                                                                                    DECREASE B/L EFFUSION    ECHO:

## 2018-12-27 NOTE — DIETITIAN INITIAL EVALUATION ADULT. - OTHER INFO
Pt sent in by PCP s/p EKG indicating LBBB. Initially admitted to [facility] but code STEMI and transfer to [facility]. Primary dx: STEMI, chest pain, CHARLES. Hospital course complicated by NSTEMI, new LBBB with acute diastolic CHF with Severe Aortic stenosis, Pulmonary HTN, Afib s/p cardiac cath 12/21. Reason for assessment: LOS. Pt sent in by PCP s/p EKG indicating LBBB. Initially admitted to [facility] but code STEMI and transfer to [facility]. Primary dx: STEMI, chest pain, CHARLES. Hospital course complicated by NSTEMI, new LBBB with acute diastolic CHF with Severe Aortic stenosis, Pulmonary HTN, Afib s/p cardiac cath 12/21. Acute hypoxic respiratory failure 2/2 pulmonary edema--resolved. KIAN d/t ATN or ANN--improving. AG metabolic acidosis, sodium bicarb in place. Glaucoma. Reason for assessment: LOS.

## 2018-12-27 NOTE — DISCHARGE NOTE ADULT - PLAN OF CARE
medical therapy, prevent recurrence Please take all medication as directed. Follow up with cardiology 1 week after discharge. Return to hospital if you experience chest pain, shortness breath, dizziness or catheter site bleeding. Please keep low sodium and cholesterol diet, no smoking. monitor Please follow up with primary doctor in 1 week to recheck blood work. follow up Please follow up with cardiology in 1 week for workup and further management. Please follow up with primary doctor in 1 week to recheck blood work, f/u with nephrology after dicharge

## 2018-12-27 NOTE — DIETITIAN INITIAL EVALUATION ADULT. - NUTRITIONGOAL OUTCOME1
Pt to continue to consume/tolerate at least 75% meals and snacks throughout LOS. Reassess in 7 days.

## 2018-12-27 NOTE — PROGRESS NOTE ADULT - SUBJECTIVE AND OBJECTIVE BOX
Nephrology progress note    Patient is seen and examined, events over the last 24 h noted .  Feels better, Milagros bhatti/pancho'ed  Allergies:  No Known Allergies    Hospital Medications:   MEDICATIONS  (STANDING):  apixaban 2.5 milliGRAM(s) Oral every 12 hours  aspirin  chewable 81 milliGRAM(s) Oral daily  atorvastatin 40 milliGRAM(s) Oral at bedtime  chlorhexidine 4% Liquid 1 Application(s) Topical <User Schedule>  clopidogrel Tablet 75 milliGRAM(s) Oral daily  furosemide   Injectable 40 milliGRAM(s) IV Push once  furosemide   Injectable 40 milliGRAM(s) IV Push daily  metoprolol tartrate 50 milliGRAM(s) Oral every 8 hours  sodium bicarbonate 325 milliGRAM(s) Oral three times a day  timolol 0.5% Solution 1 Drop(s) Both EYES daily        VITALS:  T(F): 96 (18 @ 04:00), Max: 98 (18 @ 20:00)  HR: 75 (18 @ 06:00)  BP: 90/47 (18 @ 06:00)  RR: 20 (18 @ 06:00)  SpO2: 96% (18 @ 06:00)  Wt(kg): --     @ 07:  -   @ 07:00  --------------------------------------------------------  IN: 1170 mL / OUT: 2105 mL / NET: -935 mL     @ 07:  -   @ 07:00  --------------------------------------------------------  IN: 1400 mL / OUT: 1716 mL / NET: -316 mL     @ 07:01  -   @ 06:43  --------------------------------------------------------  IN: 830 mL / OUT: 1900 mL / NET: -1070 mL          PHYSICAL EXAM:  Constitutional: NAD  Neck: No JVD  Respiratory: BS b/l clear  Cardiovascular: S1, S2, RRR  Gastrointestinal: BS+, soft, NT/ND  Extremities:  No peripheral edema  Neurological: calm  : No CVA tenderness. No monaco.   Skin: No rashes  Vascular Access:    LABS:      138  |  95<L>  |  x   ----------------------------<  86  4.4   |  24  |  2.0<H>    Ca    8.6      27 Dec 2018 04:15    TPro  6.3  /  Alb  3.2<L>  /  TBili  0.8  /  DBili      /  AST  72<H>  /  ALT  168<H>  /  AlkPhos  95                            12.4   12.78 )-----------( 292      ( 27 Dec 2018 04:15 )             38.0       Urine Studies:  Urinalysis Basic - ( 23 Dec 2018 22:20 )    Color: Red / Appearance: Turbid / S.025 / pH:   Gluc:  / Ketone: Negative  / Bili: Small / Urobili: 1.0 mg/dL   Blood:  / Protein: 30 mg/dL / Nitrite: Negative   Leuk Esterase: Moderate / RBC: >50 /HPF / WBC 26-50 /HPF   Sq Epi:  / Non Sq Epi: Few /HPF / Bacteria: Many /HPF      Sodium, Random Urine: 23.0 mmoL/L ( @ 16:39)  Osmolality, Random Urine: 453 mos/kg ( @ 16:39)  Protein/Creatinine Ratio Calculation: 0.2 Ratio ( @ 16:39)  Creatinine, Random Urine: 114 mg/dL ( @ 16:39)    RADIOLOGY & ADDITIONAL STUDIES:

## 2018-12-27 NOTE — DISCHARGE NOTE ADULT - PATIENT PORTAL LINK FT
You can access the [facility] Portal, offered by [facility], by registering with the following website: [email or web address]

## 2018-12-27 NOTE — PROGRESS NOTE ADULT - ASSESSMENT
83/M with KIAN following STEMi, angioplasty, new Afib with RVR, CHF.    KIAN due to ATN or ANN - improving  - FeNa appears prerenal (cardiorenal)  - segundo castillo neg for hydro  - creat peaked at 3.6<-3.6 and is down to 2.0 (baseline 1.1)  -  phos noted  - cont LAsix 40 IV qd - CXR still with congestion    AG Met acidosis - cont  NA bicarb 325 po tid and d/c tomorrow if creat/bicarb cont to improve    Afib with RVR - on betablocker and Eliquis  Avoid hypotension  Will follow

## 2018-12-27 NOTE — CHART NOTE - NSCHARTNOTEFT_GEN_A_CORE
84 y/o M with PMHx glaucoma, no cardiac hx, c/o sob on minimal exertion x2 days. He felt as if there was a sense of congestion in his upper airways, which he would only feel if he was exerting himself. Today (Day of admission) he was going up and down some stairs and had the same feeling, but denies any chest pain/pressure/neck or jaw pain/nausea/diaphoresis. At baseline he has no exercise limitation, denies any family hx of heart dx, has had no cardiac interventions in the past. He went to Dr Joy's office, who did EKG showing new LBBB. He was sent to [facility] ER, EKG showed same finding, CODE STEMI was called and he was transferred to [place]. (19 Dec 2018 22:17)    >>> STEMI> Pt was diagnosed with STEMI > CODE STEMI-new LBBB, signs of fluid overload (on 3L nc sating 94%, +congestion on cxr, +crackles), CE x 3 0.75 > 0.75 > 0.65  > 12/20 Pt had rate dependent LBBB, had several runs of sustained afib RVR in 150s-160s. Pt asymptomatic, normotensive during episodes. 12 lead EKG showed afib RVR with LBBB. He was started on amio bolus and drip and later switched to po amiodarone.  > Cardiac cath was done by Dr. Craig on 12/21 which showed 100% occlusion Left circumflex artery and 80-90% stenosis LAD, patent RCA. Cobra stent was placed in LCX. His TTE 12/21 showed normal EF but PSAP 93.  > 12/21 s/p cath patient desaturated in early 80s,  He was put on High flow 50%. He was then saturating in 90s%. stat CXR was ordered which showed pulmonary congestion. ABGs showed Normal Ph, Normal pco2. Po2 in 40s. EKG unchanged from prior. Bedside USG showed diffuse B lines. Duplex lower extremities then showed left post tibial DVT. Pt was started on Heparin infusion and iv Lasix.  > His HR remained 130s-140s so he was started on Metoprolol which was titrated according to pt's HR to 50mg Metoprolol tartrate Q6.   > Echo 12/22 showed EF 45%, global cardiomyopathy, severe AS, Estimated pulmonary artery systolic pressure is 101.4 mmHg assuming a right atrial pressure of 15 mmHg, which is consistent with severe pulmonary hypertension. His chest xray kept showing pulmonary congestion. His iv Lasix was continued.  > Echo was repeated again on 12/24 because pt came off from NC and saturated in 90s on room air but chest xray remained congested and Echo kept showing pul artery pressure in 90s-100s. Dr Gonzalez and Dr Felix saw the echo and said its in 60s actually.        >>> KIAN > He presented with Creat of 1.2 which then increased to 3.6 on 12/23. He was started on iv hydration. His creat from today 12/27 is 2.0 84 y/o M with PMHx glaucoma, no cardiac hx, c/o sob on minimal exertion x2 days. He felt as if there was a sense of congestion in his upper airways, which he would only feel if he was exerting himself. Today (Day of admission) he was going up and down some stairs and had the same feeling, but denies any chest pain/pressure/neck or jaw pain/nausea/diaphoresis. At baseline he has no exercise limitation, denies any family hx of heart dx, has had no cardiac interventions in the past. He went to Dr Joy's office, who did EKG showing new LBBB. He was sent to [facility] ER, EKG showed same finding, CODE STEMI was called and he was transferred to [place]. (19 Dec 2018 22:17)    >>> STEMI> Pt was diagnosed with STEMI > CODE STEMI-new LBBB, signs of fluid overload (congestion on cxr, +crackles), CE x 3 0.75 > 0.75 > 0.65  > 12/20 Pt had rate dependent LBBB, had several runs of sustained afib RVR in 150s-160s. Pt asymptomatic, normotensive during episodes. 12 lead EKG showed afib RVR with LBBB. He was started on amio bolus and drip and later switched to po amiodarone. Amio was then stopped and changed to Eliquis 12/26 and heparin was stopped. His HR remained 130s-140s so he was started on Metoprolol which was titrated according to pt's HR to 50mg Metoprolol tartrate Q8.   > Cardiac cath was done by Dr. Craig on 12/21 which showed 100% occlusion Left circumflex artery and 80-90% stenosis LAD, patent RCA. Cobra stent was placed in LCX. His TTE 12/21 showed normal EF but PSAP 93.  > 12/21 s/p cath patient desaturated in early 80s,  He was put on High flow 50%. He was then saturating in 90s%. stat CXR was ordered which showed pulmonary congestion. ABGs showed Normal Ph, Normal pco2. Po2 in 40s. EKG unchanged from prior. Bedside USG showed diffuse B lines. Duplex lower extremities then showed left post tibial DVT. Pt was started on Heparin infusion(was stopped on 12/26 and was switched to Eliquis) and iv Lasix.  > Echo 12/22 showed EF 45%, global cardiomyopathy, severe AS, Estimated pulmonary artery systolic pressure is 101.4 mmHg assuming a right atrial pressure of 15 mmHg, which is consistent with severe pulmonary hypertension. His chest xray kept showing pulmonary congestion. His iv Lasix was continued.  > Repeat TTE 12/24 showed Pul pressure in 60s as per Dr Gonzalez, as the official report says its 94, But pt was saturating well on room air    > ON DISCHARGE > Pt advised by cardio for PALAK as out patient. Pt needs to follow up with Dr Gonzalez as out patient and Dr Arnaldo Savage-EP. Discharge pt on Metoprolol 50mg XL once a day, aspirin, plavix and increase the dose of Eliquis to 5mg BID once his creat is stable          >>> KIAN > He presented with Creat of 1.2 which then increased to 3.6 on 12/23. He was started on iv hydration. His creat from today 12/27 is 2.0. His renal ultrasound was negative for hydronephrosis 12/23. His ABGs showed metabolic acidosis so he was started on bicarb 325 po TID as per nephro rec.        >>>ICU Vital Signs Last 24 Hrs  T(C): 36.1 (27 Dec 2018 12:00), Max: 36.7 (26 Dec 2018 20:00)  T(F): 97 (27 Dec 2018 12:00), Max: 98 (26 Dec 2018 20:00)  HR: 66 (27 Dec 2018 14:00) (66 - 78)  BP: 96/51 (27 Dec 2018 12:00) (89/46 - 116/58)  BP(mean): 61 (27 Dec 2018 12:00) (61 - 75)  RR: 16 (27 Dec 2018 14:00) (16 - 20)  SpO2: 96% (27 Dec 2018 06:00) (96% - 100%)      >>>PHYSICAL EXAM:  GEN: No acute distress  CHEST: B/l basal rales+  CVS: S1/S2 present, soft S2. Irregular HR. Systolic murmur+  ABD: Soft, non-tender, non-distended. Bowel sounds present  NEURO: AAOX3  EXt: no edema feet

## 2018-12-27 NOTE — DISCHARGE NOTE ADULT - CARE PROVIDER_API CALL
Donald Joy), [address]  [address] [place] [address]  Phone: [phone]  Fax: [phone]    Eleanor Gonzalez), Cardiovascular Disease; Internal Medicine; Interventional Cardiology  [facility] [place] [address]  Phone: [phone]  Fax: [phone] Donald Joy), [id number]  [address] [place] [address]  Phone: [phone]  Fax: [phone]    Eleanor Gonzalez), Cardiovascular Disease; Internal Medicine; Interventional Cardiology  [address] [place] [address]  Phone: [phone]  Fax: [phone]    Belén Gonzalez), Nephrology  [address] [place] [address]  Phone: [phone]  Fax: [phone]    Janette Mcintosh), Medicine  Physicians  [address] [place] [address]  Phone: [phone]  Fax: [phone]

## 2018-12-27 NOTE — PROGRESS NOTE ADULT - ASSESSMENT
IMPRESSION:  Acute resp failure   most likely secondary to acute pulmonary edema   pulmonary HTN   dvt ?? PE   STEMI  KIAN   sevre AS    PLAN:    CNS: no sedation     HEENT: oral care     PULMONARY: taper fio2 keep pox . 92%     CARDIOVASCULAR: heparin drip follow PTT   CONTINUE LASIX KEEP iS < oS DID WELL ON q 12 HRS FOLLOW CARDIOLOGY AND RENAL       GI: GI prophylaxis.  Feeding     RENAL: follow lytes , renal Us follow result renal consult   send urine lytes cr     INFECTIOUS DISEASE: no abx now will follow     HEMATOLOGICAL:  DVT prophylaxis. on heparin drip follow PTT on coumadin bridge     ENDOCRINE:  Follow up FS.  Insulin protocol if needed.    addendum note :     follow cardiology if ok to go tele

## 2018-12-27 NOTE — DIETITIAN INITIAL EVALUATION ADULT. - PHYSICAL APPEARANCE
BMI 27.5 (180#, 68in). Denies recent changes in UBW. no edema noted. skin intact/BS 21/well nourished

## 2018-12-28 VITALS
TEMPERATURE: 98 F | RESPIRATION RATE: 18 BRPM | DIASTOLIC BLOOD PRESSURE: 52 MMHG | SYSTOLIC BLOOD PRESSURE: 94 MMHG | HEART RATE: 81 BPM

## 2018-12-28 LAB
ALBUMIN SERPL ELPH-MCNC: 3.3 G/DL — LOW (ref 3.5–5.2)
ALP SERPL-CCNC: 85 U/L — SIGNIFICANT CHANGE UP (ref 30–115)
ALT FLD-CCNC: 135 U/L — HIGH (ref 0–41)
ANION GAP SERPL CALC-SCNC: 17 MMOL/L — HIGH (ref 7–14)
AST SERPL-CCNC: 57 U/L — HIGH (ref 0–41)
BASOPHILS # BLD AUTO: 0.04 K/UL — SIGNIFICANT CHANGE UP (ref 0–0.2)
BASOPHILS NFR BLD AUTO: 0.3 % — SIGNIFICANT CHANGE UP (ref 0–1)
BILIRUB SERPL-MCNC: 0.9 MG/DL — SIGNIFICANT CHANGE UP (ref 0.2–1.2)
BUN SERPL-MCNC: 61 MG/DL — CRITICAL HIGH (ref 10–20)
CALCIUM SERPL-MCNC: 9.1 MG/DL — SIGNIFICANT CHANGE UP (ref 8.5–10.1)
CHLORIDE SERPL-SCNC: 98 MMOL/L — SIGNIFICANT CHANGE UP (ref 98–110)
CO2 SERPL-SCNC: 26 MMOL/L — SIGNIFICANT CHANGE UP (ref 17–32)
CREAT SERPL-MCNC: 1.8 MG/DL — HIGH (ref 0.7–1.5)
EOSINOPHIL # BLD AUTO: 0.11 K/UL — SIGNIFICANT CHANGE UP (ref 0–0.7)
EOSINOPHIL NFR BLD AUTO: 0.9 % — SIGNIFICANT CHANGE UP (ref 0–8)
GLUCOSE SERPL-MCNC: 83 MG/DL — SIGNIFICANT CHANGE UP (ref 70–99)
HCT VFR BLD CALC: 35.8 % — LOW (ref 42–52)
HGB BLD-MCNC: 11.6 G/DL — LOW (ref 14–18)
IMM GRANULOCYTES NFR BLD AUTO: 1.3 % — HIGH (ref 0.1–0.3)
LYMPHOCYTES # BLD AUTO: 1.54 K/UL — SIGNIFICANT CHANGE UP (ref 1.2–3.4)
LYMPHOCYTES # BLD AUTO: 12.9 % — LOW (ref 20.5–51.1)
MAGNESIUM SERPL-MCNC: 2.2 MG/DL — SIGNIFICANT CHANGE UP (ref 1.8–2.4)
MCHC RBC-ENTMCNC: 27.7 PG — SIGNIFICANT CHANGE UP (ref 27–31)
MCHC RBC-ENTMCNC: 32.4 G/DL — SIGNIFICANT CHANGE UP (ref 32–37)
MCV RBC AUTO: 85.4 FL — SIGNIFICANT CHANGE UP (ref 80–94)
MONOCYTES # BLD AUTO: 1.12 K/UL — HIGH (ref 0.1–0.6)
MONOCYTES NFR BLD AUTO: 9.4 % — HIGH (ref 1.7–9.3)
NEUTROPHILS # BLD AUTO: 8.94 K/UL — HIGH (ref 1.4–6.5)
NEUTROPHILS NFR BLD AUTO: 75.2 % — SIGNIFICANT CHANGE UP (ref 42.2–75.2)
NRBC # BLD: 0 /100 WBCS — SIGNIFICANT CHANGE UP (ref 0–0)
PLATELET # BLD AUTO: 311 K/UL — SIGNIFICANT CHANGE UP (ref 130–400)
POTASSIUM SERPL-MCNC: 4.6 MMOL/L — SIGNIFICANT CHANGE UP (ref 3.5–5)
POTASSIUM SERPL-SCNC: 4.6 MMOL/L — SIGNIFICANT CHANGE UP (ref 3.5–5)
PROT SERPL-MCNC: 6 G/DL — SIGNIFICANT CHANGE UP (ref 6–8)
RBC # BLD: 4.19 M/UL — LOW (ref 4.7–6.1)
RBC # FLD: 13.8 % — SIGNIFICANT CHANGE UP (ref 11.5–14.5)
SODIUM SERPL-SCNC: 141 MMOL/L — SIGNIFICANT CHANGE UP (ref 135–146)
WBC # BLD: 11.91 K/UL — HIGH (ref 4.8–10.8)
WBC # FLD AUTO: 11.91 K/UL — HIGH (ref 4.8–10.8)

## 2018-12-28 RX ORDER — FUROSEMIDE 40 MG
40 TABLET ORAL DAILY
Qty: 0 | Refills: 0 | Status: DISCONTINUED | OUTPATIENT
Start: 2018-12-28 | End: 2018-12-28

## 2018-12-28 RX ORDER — ATORVASTATIN CALCIUM 80 MG/1
1 TABLET, FILM COATED ORAL
Qty: 30 | Refills: 0 | OUTPATIENT
Start: 2018-12-28 | End: 2019-01-26

## 2018-12-28 RX ORDER — ASPIRIN/CALCIUM CARB/MAGNESIUM 324 MG
1 TABLET ORAL
Qty: 30 | Refills: 0 | OUTPATIENT
Start: 2018-12-28 | End: 2019-01-26

## 2018-12-28 RX ORDER — METOPROLOL TARTRATE 50 MG
1 TABLET ORAL
Qty: 30 | Refills: 0 | OUTPATIENT
Start: 2018-12-28 | End: 2019-01-26

## 2018-12-28 RX ORDER — CLOPIDOGREL BISULFATE 75 MG/1
1 TABLET, FILM COATED ORAL
Qty: 30 | Refills: 0 | OUTPATIENT
Start: 2018-12-28 | End: 2019-01-26

## 2018-12-28 RX ORDER — LISINOPRIL 2.5 MG/1
1 TABLET ORAL
Qty: 30 | Refills: 0 | OUTPATIENT
Start: 2018-12-28 | End: 2019-01-26

## 2018-12-28 RX ORDER — APIXABAN 2.5 MG/1
1 TABLET, FILM COATED ORAL
Qty: 60 | Refills: 0 | OUTPATIENT
Start: 2018-12-28 | End: 2019-01-26

## 2018-12-28 RX ORDER — FUROSEMIDE 40 MG
1 TABLET ORAL
Qty: 30 | Refills: 0 | OUTPATIENT
Start: 2018-12-28 | End: 2019-01-26

## 2018-12-28 RX ORDER — NITROGLYCERIN 6.5 MG
1 CAPSULE, EXTENDED RELEASE ORAL
Qty: 30 | Refills: 0 | OUTPATIENT
Start: 2018-12-28 | End: 2019-01-11

## 2018-12-28 RX ADMIN — Medication 1 DROP(S): at 11:27

## 2018-12-28 RX ADMIN — Medication 325 MILLIGRAM(S): at 07:03

## 2018-12-28 RX ADMIN — APIXABAN 2.5 MILLIGRAM(S): 2.5 TABLET, FILM COATED ORAL at 07:04

## 2018-12-28 RX ADMIN — Medication 40 MILLIGRAM(S): at 07:03

## 2018-12-28 RX ADMIN — CHLORHEXIDINE GLUCONATE 1 APPLICATION(S): 213 SOLUTION TOPICAL at 07:04

## 2018-12-28 RX ADMIN — CLOPIDOGREL BISULFATE 75 MILLIGRAM(S): 75 TABLET, FILM COATED ORAL at 11:27

## 2018-12-28 NOTE — PROGRESS NOTE ADULT - ASSESSMENT
Assessment: Pt is an 84 yo M sp STEMI with new LBBB and PAFib. Pt feeling well, no c/o chest pain, palpitations or SOB.    PAFib, CHADSVASC = 3  Plan:  - C/W Metoprolol 50 mg  - C/W anticoagulation  - Continue current medical therapy  - Follow up with Dr Mcintosh in 3-4 weeks

## 2018-12-28 NOTE — PROGRESS NOTE ADULT - SUBJECTIVE AND OBJECTIVE BOX
Nephrology progress note    Patient is seen and examined, events over the last 24 h noted .  No new complaints    Allergies:  No Known Allergies    Hospital Medications:   MEDICATIONS  (STANDING):  apixaban 2.5 milliGRAM(s) Oral every 12 hours  aspirin  chewable 81 milliGRAM(s) Oral daily  atorvastatin 40 milliGRAM(s) Oral at bedtime  chlorhexidine 4% Liquid 1 Application(s) Topical <User Schedule>  clopidogrel Tablet 75 milliGRAM(s) Oral daily  furosemide    Tablet 40 milliGRAM(s) Oral daily  metoprolol tartrate 50 milliGRAM(s) Oral every 8 hours  sodium bicarbonate 325 milliGRAM(s) Oral three times a day  timolol 0.5% Solution 1 Drop(s) Both EYES daily        VITALS:  T(F): 96.9 (18 @ 05:46), Max: 97 (18 @ 16:00)  HR: 79 (18 @ 05:46)  BP: 93/53 (18 @ 05:46)  RR: 18 (18 @ 05:46)  SpO2: --  Wt(kg): --     @ 07:  -   @ 07:00  --------------------------------------------------------  IN: 1030 mL / OUT: 2200 mL / NET: -1170 mL     @ 07:  -   @ 07:00  --------------------------------------------------------  IN: 380 mL / OUT: 1131 mL / NET: -751 mL     @ 07:  -   @ 12:35  --------------------------------------------------------  IN: 960 mL / OUT: 590 mL / NET: 370 mL      Height (cm): 172.72 ( @ 12:01)    PHYSICAL EXAM:  Constitutional: NAD  HEENT: anicteric sclera, oropharynx clear, MMM  Neck: No JVD  Respiratory: CTAB, no wheezes, rales or rhonchi  Cardiovascular: S1, S2, RRR  Gastrointestinal: BS+, soft, NT/ND  Extremities: No cyanosis or clubbing. No peripheral edema  Neurological: A/O x 3, no focal deficits  Skin: No rashes  Vascular Access:    LABS:      141  |  98  |  61<HH>  ----------------------------<  83  4.6   |  26  |  1.8<H>    Ca    9.1      28 Dec 2018 07:00  Mg     2.2         TPro  6.0  /  Alb  3.3<L>  /  TBili  0.9  /  DBili      /  AST  57<H>  /  ALT  135<H>  /  AlkPhos  85                            11.6   11.91 )-----------( 311      ( 28 Dec 2018 07:00 )             35.8       Urine Studies:  Urinalysis Basic - ( 23 Dec 2018 22:20 )    Color: Red / Appearance: Turbid / S.025 / pH:   Gluc:  / Ketone: Negative  / Bili: Small / Urobili: 1.0 mg/dL   Blood:  / Protein: 30 mg/dL / Nitrite: Negative   Leuk Esterase: Moderate / RBC: >50 /HPF / WBC 26-50 /HPF   Sq Epi:  / Non Sq Epi: Few /HPF / Bacteria: Many /HPF      Sodium, Random Urine: 23.0 mmoL/L ( @ 16:39)  Osmolality, Random Urine: 453 mos/kg ( @ 16:39)  Protein/Creatinine Ratio Calculation: 0.2 Ratio ( @ 16:39)  Creatinine, Random Urine: 114 mg/dL ( @ 16:39)    RADIOLOGY & ADDITIONAL STUDIES:

## 2018-12-28 NOTE — PROGRESS NOTE ADULT - PROVIDER SPECIALTY LIST ADULT
CCU
Cardiology
Electrophysiology
Electrophysiology
Hospitalist
Hospitalist
Internal Medicine
Internal Medicine
Nephrology
Pulmonology
CCU
Pulmonology

## 2018-12-28 NOTE — PROGRESS NOTE ADULT - SUBJECTIVE AND OBJECTIVE BOX
INTERVAL HPI/OVERNIGHT EVENTS: No acute events overnight, pt feeling well.    MEDICATIONS  (STANDING):  apixaban 2.5 milliGRAM(s) Oral every 12 hours  aspirin  chewable 81 milliGRAM(s) Oral daily  atorvastatin 40 milliGRAM(s) Oral at bedtime  chlorhexidine 4% Liquid 1 Application(s) Topical <User Schedule>  clopidogrel Tablet 75 milliGRAM(s) Oral daily  furosemide    Tablet 40 milliGRAM(s) Oral daily  metoprolol tartrate 50 milliGRAM(s) Oral every 8 hours  sodium bicarbonate 325 milliGRAM(s) Oral three times a day  timolol 0.5% Solution 1 Drop(s) Both EYES daily    MEDICATIONS  (PRN):      Allergies    No Known Allergies    REVIEW OF SYSTEMS: Denies chest pain, palpitations, dizziness, SOB.      Vital Signs Last 24 Hrs  T(C): 36.1 (28 Dec 2018 05:46), Max: 36.1 (27 Dec 2018 12:00)  T(F): 96.9 (28 Dec 2018 05:46), Max: 97 (27 Dec 2018 12:00)  HR: 79 (28 Dec 2018 05:46) (66 - 79)  BP: 93/53 (28 Dec 2018 05:46) (93/53 - 110/57)  BP(mean): 77 (27 Dec 2018 16:00) (61 - 77)  RR: 18 (28 Dec 2018 05:46) (16 - 18)  SpO2: --    12-27-18 @ 07:01  -  12-28-18 @ 07:00  --------------------------------------------------------  IN: 380 mL / OUT: 1131 mL / NET: -751 mL    Physical Exam    GENERAL: In no apparent distress, well nourished, and hydrated.  NECK: Supple and normal thyroid.  No JVD or carotid bruit.  Carotid pulse is 2+ bilaterally.  HEART: Regular rate and rhythm; No murmurs, rubs, or gallops.  PULMONARY: Clear to auscultation and perfusion.  No rales, wheezing, or rhonchi bilaterally.  ABDOMEN: Soft, Nontender, Nondistended; Bowel sounds present  EXTREMITIES:  2+ Peripheral Pulses, No clubbing, cyanosis, or edema      LABS:                        11.6   11.91 )-----------( 311      ( 28 Dec 2018 07:00 )             35.8     12-28    141  |  98  |  61<HH>  ----------------------------<  83  4.6   |  26  |  1.8<H>    Ca    9.1      28 Dec 2018 07:00  Mg     2.2     12-28    TPro  6.0  /  Alb  3.3<L>  /  TBili  0.9  /  DBili  x   /  AST  57<H>  /  ALT  135<H>  /  AlkPhos  85  12-28      TELE:     RADIOLOGY & ADDITIONAL TESTS: INTERVAL HPI/OVERNIGHT EVENTS: No acute events overnight, pt feeling well.    MEDICATIONS  (STANDING):  apixaban 2.5 milliGRAM(s) Oral every 12 hours  aspirin  chewable 81 milliGRAM(s) Oral daily  atorvastatin 40 milliGRAM(s) Oral at bedtime  chlorhexidine 4% Liquid 1 Application(s) Topical <User Schedule>  clopidogrel Tablet 75 milliGRAM(s) Oral daily  furosemide    Tablet 40 milliGRAM(s) Oral daily  metoprolol tartrate 50 milliGRAM(s) Oral every 8 hours  sodium bicarbonate 325 milliGRAM(s) Oral three times a day  timolol 0.5% Solution 1 Drop(s) Both EYES daily    MEDICATIONS  (PRN):      Allergies    No Known Allergies    REVIEW OF SYSTEMS: Denies chest pain, palpitations, dizziness, SOB.      Vital Signs Last 24 Hrs  T(C): 36.1 (28 Dec 2018 05:46), Max: 36.1 (27 Dec 2018 12:00)  T(F): 96.9 (28 Dec 2018 05:46), Max: 97 (27 Dec 2018 12:00)  HR: 79 (28 Dec 2018 05:46) (66 - 79)  BP: 93/53 (28 Dec 2018 05:46) (93/53 - 110/57)  BP(mean): 77 (27 Dec 2018 16:00) (61 - 77)  RR: 18 (28 Dec 2018 05:46) (16 - 18)  SpO2: --    12-27-18 @ 07:01  -  12-28-18 @ 07:00  --------------------------------------------------------  IN: 380 mL / OUT: 1131 mL / NET: -751 mL    Physical Exam    GENERAL: In no apparent distress, well nourished, and hydrated.  NECK: Supple and normal thyroid.  No JVD or carotid bruit.  Carotid pulse is 2+ bilaterally.  HEART: Irregular rhythm with regular rate; No murmurs, rubs, or gallops.  PULMONARY: Clear to auscultation and perfusion.  No rales, wheezing, or rhonchi bilaterally.  ABDOMEN: Soft, Nontender, Nondistended; Bowel sounds present  EXTREMITIES:  2+ Peripheral Pulses, No clubbing, cyanosis, or edema      LABS:                        11.6   11.91 )-----------( 311      ( 28 Dec 2018 07:00 )             35.8     12-28    141  |  98  |  61<HH>  ----------------------------<  83  4.6   |  26  |  1.8<H>    Ca    9.1      28 Dec 2018 07:00  Mg     2.2     12-28    TPro  6.0  /  Alb  3.3<L>  /  TBili  0.9  /  DBili  x   /  AST  57<H>  /  ALT  135<H>  /  AlkPhos  85  12-28      TELE: AFib with LBBB, rate controlled at 80 bpm    RADIOLOGY & ADDITIONAL TESTS:    12 Lead ECG (12.28.18 @ 05:05)  Ventricular Rate 82 BPM    Atrial Rate 82 BPM    P-R Interval 212 ms    QRS Duration 162 ms    Q-T Interval 478 ms    QTC Calculation(Bezet) 558 ms    P Axis 53 degrees    R Axis 2 degrees    T Axis 104 degrees    Diagnosis Line Sinus rhythm with 1st degree A-V block  Possible Left atrial enlargement  Left bundle branch block  Abnormal ECG    Confirmed by ASHLEY GAUTAM, YANELY ([id number]) on 12/28/2018 9:42:03 AM      Xray Chest 1 View- PORTABLE-Routine (12.28.18 @ 06:32)  EXAM:  XR CHEST PORTABLE ROUTINE 1V            PROCEDURE DATE:  12/28/2018        INTERPRETATION:  Clinical History / Reason for exam: Abnormal chest sounds    Comparison : Chest radiograph December 27, 2018.    Technique/Positioning: Satisfactory.    Findings:    Support devices: None.    Cardiac/mediastinum/hilum: Unchanged.    Lung parenchyma/Pleura: Persistent pulmonary vascular congestion and   improved bibasilar opacities/pleural effusions.    Skeleton/soft tissues: Unchanged.    Impression:      Persistent pulmonary vascular congestion and improved bibasilar   opacities/pleural effusions.      ALMA MAYERS M.D., ATTENDING RADIOLOGIST  This document has been electronically signed. Dec 28 2018  8:39AM INTERVAL HPI/OVERNIGHT EVENTS: No acute events overnight, pt feeling well.    MEDICATIONS  (STANDING):  apixaban 2.5 milliGRAM(s) Oral every 12 hours  aspirin  chewable 81 milliGRAM(s) Oral daily  atorvastatin 40 milliGRAM(s) Oral at bedtime  chlorhexidine 4% Liquid 1 Application(s) Topical <User Schedule>  clopidogrel Tablet 75 milliGRAM(s) Oral daily  furosemide    Tablet 40 milliGRAM(s) Oral daily  metoprolol tartrate 50 milliGRAM(s) Oral every 8 hours  sodium bicarbonate 325 milliGRAM(s) Oral three times a day  timolol 0.5% Solution 1 Drop(s) Both EYES daily    MEDICATIONS  (PRN):      Allergies  No Known Allergies    REVIEW OF SYSTEMS: Denies chest pain, palpitations, dizziness, SOB.      Vital Signs Last 24 Hrs  T(C): 36.1 (28 Dec 2018 05:46), Max: 36.1 (27 Dec 2018 12:00)  T(F): 96.9 (28 Dec 2018 05:46), Max: 97 (27 Dec 2018 12:00)  HR: 79 (28 Dec 2018 05:46) (66 - 79)  BP: 93/53 (28 Dec 2018 05:46) (93/53 - 110/57)  BP(mean): 77 (27 Dec 2018 16:00) (61 - 77)  RR: 18 (28 Dec 2018 05:46) (16 - 18)  SpO2: --    12-27-18 @ 07:01  -  12-28-18 @ 07:00  --------------------------------------------------------  IN: 380 mL / OUT: 1131 mL / NET: -751 mL    Physical Exam    GENERAL: In no apparent distress, well nourished, and hydrated.  NECK: Supple and normal thyroid.  No JVD or carotid bruit.  Carotid pulse is 2+ bilaterally.  HEART: Irregular rhythm with regular rate; No murmurs, rubs, or gallops.  PULMONARY: Clear to auscultation and perfusion.  No rales, wheezing, or rhonchi bilaterally.  ABDOMEN: Soft, Nontender, Nondistended; Bowel sounds present  EXTREMITIES:  2+ Peripheral Pulses, No clubbing, cyanosis, or edema      LABS:                        11.6   11.91 )-----------( 311      ( 28 Dec 2018 07:00 )             35.8     12-28    141  |  98  |  61<HH>  ----------------------------<  83  4.6   |  26  |  1.8<H>    Ca    9.1      28 Dec 2018 07:00  Mg     2.2     12-28    TPro  6.0  /  Alb  3.3<L>  /  TBili  0.9  /  DBili  x   /  AST  57<H>  /  ALT  135<H>  /  AlkPhos  85  12-28      TELE: AFib with LBBB, rate controlled at 80 bpm    RADIOLOGY & ADDITIONAL TESTS:    12 Lead ECG (12.28.18 @ 05:05)  Ventricular Rate 82 BPM    Atrial Rate 82 BPM    P-R Interval 212 ms    QRS Duration 162 ms    Q-T Interval 478 ms    QTC Calculation(Bezet) 558 ms    P Axis 53 degrees    R Axis 2 degrees    T Axis 104 degrees    Diagnosis Line Sinus rhythm with 1st degree A-V block  Possible Left atrial enlargement  Left bundle branch block  Abnormal ECG    Confirmed by ASHLEY GAUTAM, YANELY ([id number]) on 12/28/2018 9:42:03 AM      Xray Chest 1 View- PORTABLE-Routine (12.28.18 @ 06:32)  EXAM:  XR CHEST PORTABLE ROUTINE 1V            PROCEDURE DATE:  12/28/2018        INTERPRETATION:  Clinical History / Reason for exam: Abnormal chest sounds    Comparison : Chest radiograph December 27, 2018.    Technique/Positioning: Satisfactory.    Findings:    Support devices: None.    Cardiac/mediastinum/hilum: Unchanged.    Lung parenchyma/Pleura: Persistent pulmonary vascular congestion and   improved bibasilar opacities/pleural effusions.    Skeleton/soft tissues: Unchanged.    Impression:      Persistent pulmonary vascular congestion and improved bibasilar   opacities/pleural effusions.      ALMA MAYERS M.D., ATTENDING RADIOLOGIST  This document has been electronically signed. Dec 28 2018  8:39AM

## 2018-12-28 NOTE — PROGRESS NOTE ADULT - NSHPATTENDINGPLANDISCUSS_GEN_ALL_CORE
Medical resident
Pulmonary attending
house staff and son
Cardiology NP
Medical resident
Cardiology NP
residents
Housestaff
house staff and medical resident
patient, housestaff

## 2018-12-28 NOTE — PROGRESS NOTE ADULT - SUBJECTIVE AND OBJECTIVE BOX
Cardiology Follow up    DANILO LINDSAY   83yMale  PAST MEDICAL & SURGICAL HISTORY:  Glaucoma  History of appendectomy    Allergies    No Known Allergies    Intolerances        Patient without complaints. Pt ambulated without issues/symptoms  Denies CP, SOB, palpitations, or dizziness  pt with transient LBBB with 1st degree block     Vital Signs Last 24 Hrs  T(C): 36.1 (28 Dec 2018 05:46), Max: 36.1 (27 Dec 2018 16:00)  T(F): 96.9 (28 Dec 2018 05:46), Max: 97 (27 Dec 2018 16:00)  HR: 79 (28 Dec 2018 05:46) (66 - 79)  BP: 93/53 (28 Dec 2018 05:46) (93/53 - 110/57)  BP(mean): 77 (27 Dec 2018 16:00) (77 - 77)  RR: 18 (28 Dec 2018 05:46) (16 - 18)  SpO2: --Allergies            NAD, appears well  S1S2, no murmurs, no JVD  CTA B/L, no wheeze, no rales  SNT +BS  Ext:    Right Groin:  NO hematoma,     NO bruit, dressing; C/D/I   	   Right Radial : NO   hematoma,     bleeding, dressing; C/D/I      Pulses:  +Rad/ +PTs /+DPs/ same as baseline  A&Ox 3    EKG    Ventricular Rate 82 BPM    Atrial Rate 82 BPM    P-R Interval 212 ms    QRS Duration 162 ms    Q-T Interval 478 ms    QTC Calculation(Bezet) 558 ms    P Axis 53 degrees    R Axis 2 degrees    T Axis 104 degrees    Diagnosis Line Sinus rhythm with 1st degree A-V block  Possible Left atrial enlargement  Left bundle branch block  Abnormal ECG    Confirmed by YANELY RAMIREZ MD ([id number]) on 12/28/2018 9:42:03                                                                                                                   2D ECHO   EXAM:  2-D ECHO (TTE) COMPLETE        PROCEDURE DATE:  12/24/2018      INTERPRETATION:  REPORT:    TRANSTHORACIC ECHOCARDIOGRAM REPORT         Patient Name:   DANILO LINDSAY Accession #: [id number]  Medical Rec #:  [id number]        Height:      68.0 in 172.7 cm  YOB: 1935       Weight:      178.0 lb 80.74 kg  Patient Age:    83 years         BSA:         1.95 m²  Patient Gender: M                BP:          100/60 mmHg       Date of Exam:        12/24/2018 3:20:22 PM  Referring Physician: [id number] ED UNASSIGNED  Sonographer:         Laura Carlos  Reading Physician:   Latesha Murphy MD.    Procedure:   2D Echo/Doppler/Color Doppler Complete.  Indications: I21.3 - ST Elevation STEMI Myocardial Infarction of   unspecified  Diagnosis:   I21.3 - ST Elevation STEMI Myocardial Infarction of   unspecifie         Summary:   1. LV Ejection Fraction by Geller's Method with a biplane EF of 73 %.   2. Normal left ventricular size and wall thicknesses, with normal   systolic function.   3. The mean global longitudinal strain by speckle tracking is -17.2%   which is borderline.   4. Mild to moderate mitral valve regurgitation.   5. Mild tricuspid regurgitation.   6. Mild aortic regurgitation.   7. Fibrocalcific AV with normal opening.   8. Pulmonic valve regurgitation.   9. Estimated pulmonary artery systolic pressure is 99.9 mmHg assuming a   right atrial pressure of 15 mmHg, which is consistent with severe   pulmonary hypertension.  10. LA volume Index is 24.4 ml/m² ml/m2.    PHYSICIAN INTERPRETATION:  Left Ventricle: Normal left ventricular size and wall thicknesses, with   normal systolic function. There is no left ventricular hypertrophy. The   mean global longitudinal strain by speckle tracking is -17.2% which is   borderline. LV Ejection Fraction by Geller's Method with a biplane EF of   73 %.  Right Ventricle: Normal right ventricular size and function.  Left Atrium: Normal left atrial size. LA volume Index is 24.4 ml/m² ml/m2.  Right Atrium: Normal right atrial size.  Pericardium: There is no evidence of pericardial effusion.  Mitral Valve: Structurally normal mitral valve, with normal leaflet   excursion. Mild to moderate mitral valve regurgitation is seen. Peak   transmitral mean gradient equals2.6 mmHg, calculated mitral valve area   by pressure half time equals 4.33 cm² consistent with No evidence of   mitral stenosis.  Tricuspid Valve: Structurally normal tricuspid valve, with normal leaflet   excursion. Mild tricuspid regurgitation is visualized. Estimated   pulmonary artery systolic pressure is 99.9 mmHg assuming a right atrial   pressure of 15 mmHg, which is consistent with severe pulmonary   hypertension.  Aortic Valve: Normal trileaflet aortic valve with normal opening. Mild   aortic valve regurgitation is seen. Peak Av velocity is 1.34 m/s, mean   transaortic gradient equals 3.7 mmHg. Fibrocalcific AV with normal   opening.  Pulmonic Valve: Structurally normal pulmonic valve, with normal leaflet   excursion. Mild pulmonic valve regurgitation.  Aorta: The aortic root and ascending aorta are structurally normal, with   no evidence of dilitation.  Pulmonary Artery: The main pulmonary artery is normal in size.  Venous: The inferior vena cava was dilated, with respiratory size   variation less than 50%.     2D AND M-MODE MEASUREMENTS (normal ranges within parentheses):  Left                 Normal    Aorta/Left           Normal  Ventricle:                     Atrium:  IVSd (2D):  1.68 cm  (0.7-1.1) AoV Cusp      1.72   (1.5-2.6)  LVPWd (2D): 1.10 cm  (0.7-1.1) Separation:   cm  LVIDd (2D): 4.55 cm  (3.4-5.7) Left Atrium   4.51   (1.9-4.0)  LVIDs (2D): 3.17 cm            (Mmode):      cm  LV FS (2D): 30.4 %   (>25%)    LA Volume     24.4  IVSd        1.88 cm  (0.7-1.1) Index         ml/m²  (Mmode):                       Right  LVPWd       1.12 cm  (0.7-1.1) Ventricle:  (Mmode):                       RVd (2D):      3.01 cm  LVIDd       4.68 cm  (3.4-5.7) TAPSE:         1.90 cm  (Mmode):  LVIDs       3.28 cm  (Mmode):  LV FS       29.9 %   (>25%)  (Mmode):  Relative    0.48     (<0.42)  Wall  Thickness  Rel. Wall   0.48     (<0.42)  Thickness  Mm  LV Mass     149.7  Index:      g/m²  Mmode    SPECTRAL DOPPLER ANALYSIS:  LV DIASTOLIC FUNCTION:  MV Peak E: 1.45 m/s Decel Time: 175 msec  MV Peak A: 0.89 m/s  E/A Ratio: 1.63    Aortic Valve:  AoV VMax:    1.34 m/s AoV Area, Vmax:       2.12 cm² Vmax Indx:    1.09   cm²/m²  AoV VTI:     0.24 m   AoV Area, VTI:        1.91 cm² VTI Indx:     0.98   cm²/m²  AoV Pk Grad: 7.2 mmHg AoV Area, Mn Grad:    2.13 cm² Mn Grad Indx: 1.10   cm²/m²  AoV Mn Grad: 3.7 mmHg AoV Area, Planimetry: 1.72 cm²    LVOT Vmax: 0.74 m/s  LVOT VTI:  0.12 m  LVOT Diam: 2.21 cm    Aortic Insufficiency:  AI Half-time:  473 msec  AI Decel Rate: 2.19 m/s²    Mitral Valve:  MV VMax:    1.48 m/s MV P1/2 Time: 50.82 msec  MV Mn Grad: 2.6 mmHg MV Area, PHT: 4.33 cm²    Tricuspid Valve and PA/RV Systolic Pressure: TR Max Velocity: 4.61 m/s RA   Pressure: 15 mmHg RVSP/PASP: 99.9 mmHg    Pulmonic Valve:  PV Max Velocity: 0.96 m/s PV Max PG: 3.6 mmHg PV Mean PG:       [id number] Latesha Murphy MD, Electronically signed on 12/25/2018 at   8:45:50 AM         *** Final ***                    LATESHA MURPHY MD  This document has been electronically signed. Dec 24 2018  3:20PM                  LABS                        11.6   11.91 )-----------( 311      ( 28 Dec 2018 07:00 )             35.8     12-28    141  |  98  |  61<HH>  ----------------------------<  83  4.6   |  26  |  1.8<H>    Ca    9.1      28 Dec 2018 07:00  Mg     2.2     12-28    TPro  6.0  /  Alb  3.3<L>  /  TBili  0.9  /  DBili  x   /  AST  57<H>  /  ALT  135<H>  /  AlkPhos  85  12-28        Magnesium, Serum: 2.2 mg/dL [1.8 - 2.4] (12-28-18 @ 07:00)  LIVER FUNCTIONS - ( 28 Dec 2018 07:00 )  Alb: 3.3 g/dL / Pro: 6.0 g/dL / ALK PHOS: 85 U/L / ALT: 135 U/L / AST: 57 U/L / GGT: x                 IMPRESSION AND PLAN:    Cr 1.8 from 2, creatinine trending down , renal following   Possible PALAK as an out patient  AC/ASA 81 EC + Plavix  IV diuresis once a day and PRN only if needed  Daily weight, monitor I/O's   Pt given instructions on importance of taking antiplatelet medication or risk acute stent thrombosis/death    Post cath instructions, access site care and activity restrictions reviewed with patient      Discussed with patient to return to hospital if experience chest pain, shortness breath, dizziness and site bleeding    Aggressive risk factor modification,  diet counseling, smoking cessation discussed with patient       f/u with dr. Gonzalez regarding disposition

## 2018-12-28 NOTE — PROGRESS NOTE ADULT - REASON FOR ADMISSION
CODE STEMI
NSTEMI
CODE STEMI

## 2018-12-28 NOTE — PROGRESS NOTE ADULT - ASSESSMENT
83/M with KIAN following STEMi, angioplasty, new Afib with RVR, CHF.    KIAN due to ATN or ANN - improving, 1.8 mg % today  - segundo bundyo neg for hydro  - creat peaked at 3.6<-3.6 and is down to 2.0 (baseline 1.1)  - cont LAsix 40 IV qd - CXR still with congestion    AG Met acidosis -resolved,   d/c NA bicarb 325 po tid and d/c tomorrow if creat/bicarb cont to improve    Afib with RVR - on betablocker and Eliquis  Avoid hypotension  will sign off  call prn

## 2018-12-28 NOTE — PROGRESS NOTE ADULT - ASSESSMENT
SUBJECTIVE:    Patient is a 83y old  Male who presents with a chief complaint of CODE STEMI (27 Dec 2018 17:17)    Currently admitted to medicine with the primary diagnosis of STEMI (ST elevation myocardial infarction)     Today is hospital day 9d. This morning he is resting comfortably in bed and reports no new issues or overnight events.     PAST MEDICAL & SURGICAL HISTORY  PAST MEDICAL & SURGICAL HISTORY:  Glaucoma  History of appendectomy    SOCIAL HISTORY:    ALLERGIES:  No Known Allergies    MEDICATIONS:  STANDING MEDICATIONS  apixaban 2.5 milliGRAM(s) Oral every 12 hours  aspirin  chewable 81 milliGRAM(s) Oral daily  atorvastatin 40 milliGRAM(s) Oral at bedtime  chlorhexidine 4% Liquid 1 Application(s) Topical <User Schedule>  clopidogrel Tablet 75 milliGRAM(s) Oral daily  furosemide   Injectable 40 milliGRAM(s) IV Push daily  metoprolol tartrate 50 milliGRAM(s) Oral every 8 hours  sodium bicarbonate 325 milliGRAM(s) Oral three times a day  timolol 0.5% Solution 1 Drop(s) Both EYES daily    PRN MEDICATIONS    VITALS:   T(F): 96.8  HR: 73  BP: 100/55  RR: 18  SpO2: 96%    LABS:                        12.4   12.78 )-----------( 292      ( 27 Dec 2018 04:15 )             38.0     12-27    137  |  92<L>  |  63<HH>  ----------------------------<  90  4.5   |  24  |  2.0<H>    Ca    9.2      27 Dec 2018 17:03    TPro  6.3  /  Alb  3.2<L>  /  TBili  0.8  /  DBili  x   /  AST  72<H>  /  ALT  168<H>  /  AlkPhos  95  12-27    PT/INR - ( 26 Dec 2018 05:47 )   PT: 16.40 sec;   INR: 1.43 ratio         PTT - ( 26 Dec 2018 05:47 )  PTT:75.3 sec              RADIOLOGY:    PHYSICAL EXAM:  GEN: No acute distress  HEENT:   LUNGS: Clear to auscultation bilaterally   HEART: S1/S2 present. RRR.   ABD: Soft, non-tender, non-distended. Bowel sounds present  EXT:  NEURO: AAOX3    Assessment and Plan:  84 y/o M with PMHx glaucoma, no cardiac hx, c/o sob on minimal exertion x2 days. EKG showed new onset LBBB.    #NSTEMI-new LBBB, with Acute Diastoli CHF with Severe Aortic stenosis, Pulmonary HTN, Afib  -CE x 3 0.75 > 0.75 > 0.65  -Cath with Dr Craig 12/21 coronary artery disease status post PCI, stent to mid LCX on 12/21  -cw asa, plavix.  -on  Eliquis  -on  Metoprolol 50 Q8 and change it to XL 50 OD  -Started  on Eliquis 2.5 BID on  12/26 until RFTs improve then would change to 5 BID.  -on Lasix IV 40 Q24. Monitor I/O, daily weight, restrict fluids  -Echo 12/22 showed EF 45%, global cardiomyopathy, severe AS, Estimated pulmonary artery systolic pressure is 101.4 mmHg assuming a right atrial pressure of 15 mmHg, which is consistent with severe pulmonary hypertension.  -12/20 Pt with rate dependent LBBB, had several runs of sustained afib RVR in 150s-160s. Pt was asymptomatic, normotensive during episodes. 12 lead EKG showed afib RVR with LBBB. received amio bolus and drip.  -Repeat TTE 12/24 showed Pul pressure in 60s as per Dr Gonzalez as the official report says its 94, But pt is saturating well on room air  - cardiology following     #Acute Hypoxic respiratory failure sec to pulmonary  edema- resolved  -Still on IV lasix  -F/u CXR today    #Acute Kidney Injury due to AKN or  ANN, AG metabolic acidosis  -creat 3.5 > 2.0, was 1.2 on admission, clinically improving   -can consider d/c sodium bicarb if Cr/bicarb improving  -nephrology following   - monitor urine  output   - monitor BMP    #Glaucoma:   - c/w eye drops        DVT PPx: on eliquis  GI PPx: Not indicated  Activity as tolerated  Diet: regular  Dispo: from home SUBJECTIVE:    Patient is a 83y old  Male who presents with a chief complaint of CODE STEMI (27 Dec 2018 17:17)    Currently admitted to medicine with the primary diagnosis of STEMI (ST elevation myocardial infarction)     Today is hospital day 9d. This morning he is resting comfortably in bed and reports no new issues or overnight events.     PAST MEDICAL & SURGICAL HISTORY  PAST MEDICAL & SURGICAL HISTORY:  Glaucoma  History of appendectomy    SOCIAL HISTORY:    ALLERGIES:  No Known Allergies    MEDICATIONS:  STANDING MEDICATIONS  apixaban 2.5 milliGRAM(s) Oral every 12 hours  aspirin  chewable 81 milliGRAM(s) Oral daily  atorvastatin 40 milliGRAM(s) Oral at bedtime  chlorhexidine 4% Liquid 1 Application(s) Topical <User Schedule>  clopidogrel Tablet 75 milliGRAM(s) Oral daily  furosemide   Injectable 40 milliGRAM(s) IV Push daily  metoprolol tartrate 50 milliGRAM(s) Oral every 8 hours  sodium bicarbonate 325 milliGRAM(s) Oral three times a day  timolol 0.5% Solution 1 Drop(s) Both EYES daily    PRN MEDICATIONS    VITALS:   T(F): 96.8  HR: 73  BP: 100/55  RR: 18  SpO2: 96%    LABS:                        12.4   12.78 )-----------( 292      ( 27 Dec 2018 04:15 )             38.0     12-27    137  |  92<L>  |  63<HH>  ----------------------------<  90  4.5   |  24  |  2.0<H>    Ca    9.2      27 Dec 2018 17:03    TPro  6.3  /  Alb  3.2<L>  /  TBili  0.8  /  DBili  x   /  AST  72<H>  /  ALT  168<H>  /  AlkPhos  95  12-27    PT/INR - ( 26 Dec 2018 05:47 )   PT: 16.40 sec;   INR: 1.43 ratio         PTT - ( 26 Dec 2018 05:47 )  PTT:75.3 sec              RADIOLOGY:    < from: Xray Chest 1 View- PORTABLE-Routine (12.28.18 @ 06:32) >  Impression:      Persistent pulmonary vascular congestion and improved bibasilar   opacities/pleural effusions.    < end of copied text >      PHYSICAL EXAM:  GEN: No acute distress  HEENT: NCAT  LUNGS: Clear to auscultation bilaterally   HEART: S1/S2 present. systolic murmurs  ABD: Soft, non-tender, non-distended.  EXT: no edema  NEURO: AAOX3    Assessment and Plan:  84 y/o M with PMHx glaucoma, no cardiac hx, c/o sob on minimal exertion x2 days. EKG showed new onset LBBB.    #NSTEMI-new LBBB, with Acute Diastoli CHF with Severe Aortic stenosis, Pulmonary HTN, Afib  -CE x 3 0.75 > 0.75 > 0.65  -Cath with Dr Craig 12/21 coronary artery disease status post PCI, stent to mid LCX on 12/21  -cw asa, plavix.  -on  Eliquis  -on  Metoprolol 50 Q8 and change it to XL 50 on discharge  -Started  on Eliquis 2.5 BID on  12/26 until RFTs improve then would change to 5 BID.  -on Lasix IV 40 Q24. Monitor I/O, daily weight, restrict fluids  -Echo 12/22 showed EF 45%, global cardiomyopathy, severe AS, Estimated pulmonary artery systolic pressure is 101.4 mmHg assuming a right atrial pressure of 15 mmHg, which is consistent with severe pulmonary hypertension.  -12/20 Pt with rate dependent LBBB, had several runs of sustained afib RVR in 150s-160s. Pt was asymptomatic, normotensive during episodes. 12 lead EKG showed afib RVR with LBBB. received amio bolus and drip.  -Repeat TTE 12/24 showed Pul pressure in 60s as per Dr Gonzalez as the official report says its 94, But pt is saturating well on room air  - cardiology following     #Acute Hypoxic respiratory failure sec to pulmonary  edema- resolved  -breathing comfortably on room air  -switch to PO lasix 40 qd today  -CXR today shows improved bibasilar effusions but persistent pulmonary vascular congestion  -will walk with pulse ox today    #Acute Kidney Injury due to AKN or  ANN, AG metabolic acidosis  -creat 3.5 > 2.0, was 1.2 on admission, clinically improving   -can consider d/c sodium bicarb if Cr/bicarb improving  -nephrology following   - monitor urine  output   - monitor BMP    #Glaucoma:   - c/w eye drops        DVT PPx: on eliquis  GI PPx: Not indicated  Activity as tolerated  Diet: regular  Dispo: from home Assessment and Plan:  84 y/o M with PMHx glaucoma, no cardiac hx, c/o sob on minimal exertion x2 days. EKG showed new onset LBBB.    #NSTEMI-new LBBB, with Acute Diastoli CHF with Severe Aortic stenosis, Pulmonary HTN, Afib  -CE x 3 0.75 > 0.75 > 0.65  -Cath with Dr Craig 12/21 coronary artery disease status post PCI, stent to mid LCX on 12/21  -cw asa, plavix.  -on  Eliquis  -on  Metoprolol 50 Q8 and change it to XL 50 on discharge  -Started  on Eliquis 2.5 BID on  12/26 until RFTs improve then would change to 5 BID.  - will d/c on fluid restriction and Lasix po 20 mg Q 24 hours   -Echo 12/22 showed EF 45%, global cardiomyopathy, severe AS, Estimated pulmonary artery systolic pressure is 101.4 mmHg assuming a right atrial pressure of 15 mmHg, which is consistent with severe pulmonary hypertension.  -12/20 Pt with rate dependent LBBB, had several runs of sustained afib RVR in 150s-160s. Pt was asymptomatic, normotensive during episodes. 12 lead EKG showed afib RVR with LBBB. received amio bolus and drip.  -Repeat TTE 12/24 showed Pul pressure in 60s as per Dr Gonzalez as the official report says its 94, But pt is saturating well on room air  - cardiology following     #Acute Hypoxic respiratory failure sec to pulmonary  edema- resolved  -breathing comfortably on room air  -switch to PO lasix 40 qd today  -CXR today shows improved bibasilar effusions but persistent pulmonary vascular congestion  -will walk with pulse ox today    #Acute Kidney Injury due to AKN or  ANN, AG metabolic acidosis  -creat 3.5 > 2.0, was 1.2 on admission, clinically improving   -can consider d/c sodium bicarb if Cr/bicarb improving  -nephrology following   - monitor urine  output   - monitor BMP    #Glaucoma:   - c/w eye drops        DVT PPx: on eliquis  GI PPx: Not indicated  Activity as tolerated  Diet: regular  Dispo: from home

## 2018-12-28 NOTE — PROGRESS NOTE ADULT - SUBJECTIVE AND OBJECTIVE BOX
SUBJECTIVE:    Patient is a 83y old  Male who presents with a chief complaint of CODE STEMI (27 Dec 2018 17:17)    Currently admitted to medicine with the primary diagnosis of STEMI (ST elevation myocardial infarction)     Today is hospital day 9d. This morning he is resting comfortably in bed and reports no new issues or overnight events.     PAST MEDICAL & SURGICAL HISTORY  PAST MEDICAL & SURGICAL HISTORY:  Glaucoma  History of appendectomy    SOCIAL HISTORY:    ALLERGIES:  No Known Allergies    MEDICATIONS:  STANDING MEDICATIONS  apixaban 2.5 milliGRAM(s) Oral every 12 hours  aspirin  chewable 81 milliGRAM(s) Oral daily  atorvastatin 40 milliGRAM(s) Oral at bedtime  chlorhexidine 4% Liquid 1 Application(s) Topical <User Schedule>  clopidogrel Tablet 75 milliGRAM(s) Oral daily  furosemide   Injectable 40 milliGRAM(s) IV Push daily  metoprolol tartrate 50 milliGRAM(s) Oral every 8 hours  sodium bicarbonate 325 milliGRAM(s) Oral three times a day  timolol 0.5% Solution 1 Drop(s) Both EYES daily    PRN MEDICATIONS    VITALS:   T(F): 96.8  HR: 73  BP: 100/55  RR: 18  SpO2: 96%    LABS:                        12.4   12.78 )-----------( 292      ( 27 Dec 2018 04:15 )             38.0     12-27    137  |  92<L>  |  63<HH>  ----------------------------<  90  4.5   |  24  |  2.0<H>    Ca    9.2      27 Dec 2018 17:03    TPro  6.3  /  Alb  3.2<L>  /  TBili  0.8  /  DBili  x   /  AST  72<H>  /  ALT  168<H>  /  AlkPhos  95  12-27    PT/INR - ( 26 Dec 2018 05:47 )   PT: 16.40 sec;   INR: 1.43 ratio         PTT - ( 26 Dec 2018 05:47 )  PTT:75.3 sec              RADIOLOGY:    < from: Xray Chest 1 View- PORTABLE-Routine (12.28.18 @ 06:32) >  Impression:      Persistent pulmonary vascular congestion and improved bibasilar   opacities/pleural effusions.    < end of copied text >      PHYSICAL EXAM:  GEN: No acute distress  HEENT: NCAT  LUNGS: Clear to auscultation bilaterally   HEART: S1/S2 present. systolic murmurs  ABD: Soft, non-tender, non-distended.  EXT: no edema  NEURO: AAOX3 intact

## 2018-12-28 NOTE — PROGRESS NOTE ADULT - ATTENDING COMMENTS
84 y/o M with PMHx glaucoma, no cardiac hx, c/o sob on minimal exertion x2 days. EKG showed new onset LBBB, status post cath, PCI to to mid LCX on 12/21, course complicated by development of DVT , suspicion for Pulmonary embolism and KIAN      Principal Diagnoses:  coronary artery disease status post PCI, stent to mid LCX on 12/21  Non ST elevation Myocardial Infarction   atrial fibrillation with RVR  deep venous thrombosis   KIAN( possibly secondary to ATN) on chronic renal disease stage 3  AG metabolic acidosis  new onset CHF, valvular with mildly reduced EF  Acute resp failure secondary to pulmonary edema  Pulmonary HTN   Severe AS        - on heparin infusion for DVT, possibility of PE( CTA not performed because of KIAN). bridge with counmadin  -post PCI care in CCU as per cardiology  -continue with BB, statins and DAPT  -Electrolyte supplementation and follow up with BMP (Monitor Electrolytes Qdaily, Keep K+>4, Mg>2)  - diuresis with Lasix as needed  because of KIAN  -nephrology consult appreciated .
Agree with resident's assessment and plan with following additions/modifications.   ASSESSMENT:  Principal Diagnosis:  coronary artery disease status post PCI, stent to mid LCX on 12/21  Non ST elevation Myocardial Infarction   atrial fibrillation with RVR  New onset CHF, type unspecified    -post PCI care in CCU as per cardiology  -continue with BB, statins and DAPT  -on Amiodarone infusion currently , may be switched to PO as per cardiology discretion  -Electrolyte supplementation and follow up with BMP (Monitor Electrolytes Qdaily, Keep K+>4, Mg>2)  - follow up results of TTE
Patient seen and examined with fellow, labs and meds reviewed. Patient post STEMi, angioplasty, KIAN possibly due to ANN. Creatinine is improving slowly. Continue to follow closely. No RRT at this point, monitor vital signs.
Agree with resident's assessment and plan with following additions/modifications.   ASSESSMENT:  Principal Diagnosis:  coronary artery disease status post PCI, stent to mid LCX on 12/21  Non ST elevation Myocardial Infarction   atrial fibrillation with RVR  deep venous thrombosis   KIAN on chronic renal disease stage 3  new onset CHF, type unspecified    - started on heparin infusion for DVt, possibility of PE  -post PCI care in CCU as per cardiology  -continue with BB, statins and DAPT  -Amiodarone infusion switched to Po  -Electrolyte supplementation and follow up with BMP (Monitor Electrolytes Qdaily, Keep K+>4, Mg>2)  - follow up results of TTE .  -nephrology consult
The patient was seen and examined. Agree with NP plan.
84 y/o M with PMHx glaucoma, no cardiac hx, c/o sob on minimal exertion x2 days. EKG showed new onset LBBB, status post cath, PCI to to mid LCX on 12/21, course complicated by development of DVT , suspicion for Pulmonary embolism and KIAN      Principal Diagnoses:  coronary artery disease status post PCI, stent to mid LCX on 12/21  Non ST elevation Myocardial Infarction   atrial fibrillation with RVR  deep venous thrombosis   KIAN( possibly secondary to ATN) on chronic renal disease stage 3  AG metabolic acidosis  new onset CHF, valvular with mildly reduced EF  Acute resp failure secondary to pulmonary edema  Pulmonary HTN   Severe AS        - started on heparin infusion for DVT, possibility of PE( CTA not performed because of KIAN)  -post PCI care in CCU as per cardiology  -continue with BB, statins and DAPT  -Amiodarone discontinued by cardio  -Electrolyte supplementation and follow up with BMP (Monitor Electrolytes Qdaily, Keep K+>4, Mg>2)  - diuresis with Lasix reduce to 40 milligrams IV once daily because KIAN  -nephrology consult appreciated .
Patient seen and examined.    No complaints.  AFib rate controlled. Continue Eliquis. Cr improving. Continue BB.    Follow up with me in 3-4 week
Pt was seen and examined at bedside independently, pt feels OK today and denies any complaints, he is asking about discharge.  I agree with resident's findings, assessment and plan above.   Pt is clinically improved after complicated hospital stay, cleared for discharge by cardiology.  Initially he was admitted for STEMI, underwent cardiac cath with stent placement, was diagnosed with acute on chronic diastolic CHF, treated with Diuretics, developed ATI, consulted by nephrology, kidney function improved and pt was cleared for discharge home today.  He was extensively consulted regarding fluid restriction, daily weight, low sodium diet and medication compliance, he was instructed to f/u with PMD, cardiology and nephrology after discharge.
Patient seen and examined independently.

## 2018-12-31 PROBLEM — H40.9 UNSPECIFIED GLAUCOMA: Chronic | Status: ACTIVE | Noted: 2018-12-19

## 2019-01-04 ENCOUNTER — INPATIENT (INPATIENT)
Facility: HOSPITAL | Age: 84
LOS: 0 days | Discharge: ANOTHER HOSPITAL | End: 2019-01-04
Attending: INTERNAL MEDICINE | Admitting: INTERNAL MEDICINE

## 2019-01-04 ENCOUNTER — INPATIENT (INPATIENT)
Facility: HOSPITAL | Age: 84
LOS: 0 days | DRG: 291 | End: 2019-01-04
Attending: THORACIC SURGERY (CARDIOTHORACIC VASCULAR SURGERY) | Admitting: THORACIC SURGERY (CARDIOTHORACIC VASCULAR SURGERY)
Payer: MEDICARE

## 2019-01-04 VITALS
DIASTOLIC BLOOD PRESSURE: 58 MMHG | SYSTOLIC BLOOD PRESSURE: 120 MMHG | OXYGEN SATURATION: 100 % | RESPIRATION RATE: 18 BRPM | HEART RATE: 75 BPM

## 2019-01-04 VITALS — DIASTOLIC BLOOD PRESSURE: 51 MMHG | RESPIRATION RATE: 18 BRPM | SYSTOLIC BLOOD PRESSURE: 86 MMHG | HEART RATE: 87 BPM

## 2019-01-04 VITALS — HEART RATE: 32 BPM

## 2019-01-04 DIAGNOSIS — I21.4 NON-ST ELEVATION (NSTEMI) MYOCARDIAL INFARCTION: ICD-10-CM

## 2019-01-04 DIAGNOSIS — I21.9 ACUTE MYOCARDIAL INFARCTION, UNSPECIFIED: ICD-10-CM

## 2019-01-04 DIAGNOSIS — R57.0 CARDIOGENIC SHOCK: ICD-10-CM

## 2019-01-04 DIAGNOSIS — Z90.49 ACQUIRED ABSENCE OF OTHER SPECIFIED PARTS OF DIGESTIVE TRACT: Chronic | ICD-10-CM

## 2019-01-04 DIAGNOSIS — I23.5: ICD-10-CM

## 2019-01-04 LAB
ALBUMIN SERPL ELPH-MCNC: 2.2 G/DL — LOW (ref 3.3–5)
ALBUMIN SERPL ELPH-MCNC: 3.8 G/DL — SIGNIFICANT CHANGE UP (ref 3.5–5.2)
ALP SERPL-CCNC: 15 U/L — LOW (ref 40–120)
ALP SERPL-CCNC: 79 U/L — SIGNIFICANT CHANGE UP (ref 30–115)
ALT FLD-CCNC: 108 U/L — HIGH (ref 10–45)
ALT FLD-CCNC: 66 U/L — HIGH (ref 0–41)
ANION GAP SERPL CALC-SCNC: 21 MMOL/L — HIGH (ref 7–14)
ANION GAP SERPL CALC-SCNC: 29 MMOL/L — HIGH (ref 5–17)
APTT BLD: 41 SEC — HIGH (ref 27–39.2)
APTT BLD: >200 SEC — CRITICAL HIGH (ref 27.5–36.3)
AST SERPL-CCNC: 145 U/L — HIGH (ref 10–40)
AST SERPL-CCNC: 42 U/L — HIGH (ref 0–41)
BASE EXCESS BLDV CALC-SCNC: 1.2 MMOL/L — SIGNIFICANT CHANGE UP (ref -2–2)
BASE EXCESS BLDV CALC-SCNC: 1.7 MMOL/L — SIGNIFICANT CHANGE UP (ref -2–2)
BASOPHILS # BLD AUTO: 0 K/UL — SIGNIFICANT CHANGE UP (ref 0–0.2)
BASOPHILS NFR BLD AUTO: 0.1 % — SIGNIFICANT CHANGE UP (ref 0–2)
BILIRUB SERPL-MCNC: 0.8 MG/DL — SIGNIFICANT CHANGE UP (ref 0.2–1.2)
BILIRUB SERPL-MCNC: 1.5 MG/DL — HIGH (ref 0.2–1.2)
BLD GP AB SCN SERPL QL: SIGNIFICANT CHANGE UP
BUN SERPL-MCNC: 102 MG/DL — CRITICAL HIGH (ref 10–20)
BUN SERPL-MCNC: 74 MG/DL — HIGH (ref 7–23)
CA-I SERPL-SCNC: 1.04 MMOL/L — LOW (ref 1.12–1.3)
CA-I SERPL-SCNC: 1.15 MMOL/L — SIGNIFICANT CHANGE UP (ref 1.12–1.3)
CALCIUM SERPL-MCNC: 6.6 MG/DL — LOW (ref 8.4–10.5)
CALCIUM SERPL-MCNC: 9.2 MG/DL — SIGNIFICANT CHANGE UP (ref 8.5–10.1)
CHLORIDE SERPL-SCNC: 110 MMOL/L — HIGH (ref 96–108)
CHLORIDE SERPL-SCNC: 97 MMOL/L — LOW (ref 98–110)
CO2 SERPL-SCNC: 11 MMOL/L — LOW (ref 22–31)
CO2 SERPL-SCNC: 24 MMOL/L — SIGNIFICANT CHANGE UP (ref 17–32)
CREAT SERPL-MCNC: 1.41 MG/DL — HIGH (ref 0.5–1.3)
CREAT SERPL-MCNC: 2 MG/DL — HIGH (ref 0.7–1.5)
EOSINOPHIL # BLD AUTO: 0.1 K/UL — SIGNIFICANT CHANGE UP (ref 0–0.5)
EOSINOPHIL NFR BLD AUTO: 0.5 % — SIGNIFICANT CHANGE UP (ref 0–6)
FIBRINOGEN PPP-MCNC: 89 MG/DL — CRITICAL LOW (ref 350–510)
GAS PNL BLDA: SIGNIFICANT CHANGE UP
GAS PNL BLDV: 141 MMOL/L — SIGNIFICANT CHANGE UP (ref 136–145)
GAS PNL BLDV: 141 MMOL/L — SIGNIFICANT CHANGE UP (ref 136–145)
GAS PNL BLDV: SIGNIFICANT CHANGE UP
GAS PNL BLDV: SIGNIFICANT CHANGE UP
GLUCOSE SERPL-MCNC: 129 MG/DL — HIGH (ref 70–99)
GLUCOSE SERPL-MCNC: 85 MG/DL — SIGNIFICANT CHANGE UP (ref 70–99)
HCO3 BLDV-SCNC: 26 MMOL/L — SIGNIFICANT CHANGE UP (ref 22–29)
HCO3 BLDV-SCNC: 27 MMOL/L — SIGNIFICANT CHANGE UP (ref 22–29)
HCT VFR BLD CALC: 16.5 % — CRITICAL LOW (ref 39–50)
HCT VFR BLD CALC: 40 % — LOW (ref 42–52)
HCT VFR BLDA CALC: 3.2 % — LOW (ref 34–44)
HCT VFR BLDA CALC: 42.7 % — SIGNIFICANT CHANGE UP (ref 34–44)
HGB BLD CALC-MCNC: 1.1 G/DL — CRITICAL LOW (ref 14–18)
HGB BLD CALC-MCNC: 13.9 G/DL — LOW (ref 14–18)
HGB BLD-MCNC: 13.1 G/DL — LOW (ref 14–18)
HGB BLD-MCNC: 5.8 G/DL — CRITICAL LOW (ref 13–17)
INR BLD: 2.35 RATIO — HIGH (ref 0.65–1.3)
INR BLD: 7.15 RATIO — CRITICAL HIGH (ref 0.88–1.16)
LACTATE BLDV-MCNC: 1.6 MMOL/L — SIGNIFICANT CHANGE UP (ref 0.5–1.6)
LACTATE BLDV-MCNC: 2.2 MMOL/L — HIGH (ref 0.5–1.6)
LIDOCAIN IGE QN: 225 U/L — HIGH (ref 7–60)
LYMPHOCYTES # BLD AUTO: 12.6 % — LOW (ref 13–44)
LYMPHOCYTES # BLD AUTO: 2 K/UL — SIGNIFICANT CHANGE UP (ref 1–3.3)
MAGNESIUM SERPL-MCNC: 2.8 MG/DL — HIGH (ref 1.6–2.6)
MCHC RBC-ENTMCNC: 28.4 PG — SIGNIFICANT CHANGE UP (ref 27–31)
MCHC RBC-ENTMCNC: 30.2 PG — SIGNIFICANT CHANGE UP (ref 27–34)
MCHC RBC-ENTMCNC: 32.8 G/DL — SIGNIFICANT CHANGE UP (ref 32–37)
MCHC RBC-ENTMCNC: 35.2 GM/DL — SIGNIFICANT CHANGE UP (ref 32–36)
MCV RBC AUTO: 85.8 FL — SIGNIFICANT CHANGE UP (ref 80–100)
MCV RBC AUTO: 86.8 FL — SIGNIFICANT CHANGE UP (ref 80–94)
MONOCYTES # BLD AUTO: 1.6 K/UL — HIGH (ref 0–0.9)
MONOCYTES NFR BLD AUTO: 9.8 % — SIGNIFICANT CHANGE UP (ref 2–14)
NEUTROPHILS # BLD AUTO: 12.2 K/UL — HIGH (ref 1.8–7.4)
NEUTROPHILS NFR BLD AUTO: 77.1 % — HIGH (ref 43–77)
NRBC # BLD: 0 /100 WBCS — SIGNIFICANT CHANGE UP (ref 0–0)
NT-PROBNP SERPL-SCNC: HIGH PG/ML (ref 0–300)
PCO2 BLDV: 36 MMHG — LOW (ref 41–51)
PCO2 BLDV: 43 MMHG — SIGNIFICANT CHANGE UP (ref 41–51)
PH BLDV: 7.4 — SIGNIFICANT CHANGE UP (ref 7.26–7.43)
PH BLDV: 7.46 — HIGH (ref 7.26–7.43)
PHOSPHATE SERPL-MCNC: 10.1 MG/DL — HIGH (ref 2.5–4.5)
PLATELET # BLD AUTO: 37 K/UL — LOW (ref 150–400)
PLATELET # BLD AUTO: 429 K/UL — HIGH (ref 130–400)
PO2 BLDV: 12 MMHG — LOW (ref 20–40)
PO2 BLDV: 17 MMHG — LOW (ref 20–40)
POTASSIUM BLDV-SCNC: 3.5 MMOL/L — SIGNIFICANT CHANGE UP (ref 3.3–5.6)
POTASSIUM BLDV-SCNC: 3.8 MMOL/L — SIGNIFICANT CHANGE UP (ref 3.3–5.6)
POTASSIUM SERPL-MCNC: 4 MMOL/L — SIGNIFICANT CHANGE UP (ref 3.5–5)
POTASSIUM SERPL-MCNC: 6.4 MMOL/L — CRITICAL HIGH (ref 3.5–5.3)
POTASSIUM SERPL-SCNC: 4 MMOL/L — SIGNIFICANT CHANGE UP (ref 3.5–5)
POTASSIUM SERPL-SCNC: 6.4 MMOL/L — CRITICAL HIGH (ref 3.5–5.3)
PROT SERPL-MCNC: 2.8 G/DL — LOW (ref 6–8.3)
PROT SERPL-MCNC: 6.4 G/DL — SIGNIFICANT CHANGE UP (ref 6–8)
PROTHROM AB SERPL-ACNC: 26.8 SEC — HIGH (ref 9.95–12.87)
PROTHROM AB SERPL-ACNC: 87.4 SEC — HIGH (ref 10–12.9)
RBC # BLD: 1.93 M/UL — LOW (ref 4.2–5.8)
RBC # BLD: 4.61 M/UL — LOW (ref 4.7–6.1)
RBC # FLD: 13 % — SIGNIFICANT CHANGE UP (ref 10.3–14.5)
RBC # FLD: 14.6 % — HIGH (ref 11.5–14.5)
SAO2 % BLDV: 7 % — SIGNIFICANT CHANGE UP
SAO2 % BLDV: SIGNIFICANT CHANGE UP %
SODIUM SERPL-SCNC: 142 MMOL/L — SIGNIFICANT CHANGE UP (ref 135–146)
SODIUM SERPL-SCNC: 150 MMOL/L — HIGH (ref 135–145)
TROPONIN T SERPL-MCNC: 1.19 NG/ML — CRITICAL HIGH
TYPE + AB SCN PNL BLD: SIGNIFICANT CHANGE UP
WBC # BLD: 15.9 K/UL — HIGH (ref 3.8–10.5)
WBC # BLD: 17.2 K/UL — HIGH (ref 4.8–10.8)
WBC # FLD AUTO: 15.9 K/UL — HIGH (ref 3.8–10.5)
WBC # FLD AUTO: 17.2 K/UL — HIGH (ref 4.8–10.8)

## 2019-01-04 PROCEDURE — P9016: CPT

## 2019-01-04 PROCEDURE — 85730 THROMBOPLASTIN TIME PARTIAL: CPT

## 2019-01-04 PROCEDURE — 83605 ASSAY OF LACTIC ACID: CPT

## 2019-01-04 PROCEDURE — 92950 HEART/LUNG RESUSCITATION CPR: CPT

## 2019-01-04 PROCEDURE — 85014 HEMATOCRIT: CPT

## 2019-01-04 PROCEDURE — 82947 ASSAY GLUCOSE BLOOD QUANT: CPT

## 2019-01-04 PROCEDURE — 82435 ASSAY OF BLOOD CHLORIDE: CPT

## 2019-01-04 PROCEDURE — 82803 BLOOD GASES ANY COMBINATION: CPT

## 2019-01-04 PROCEDURE — 85610 PROTHROMBIN TIME: CPT

## 2019-01-04 PROCEDURE — 82330 ASSAY OF CALCIUM: CPT

## 2019-01-04 PROCEDURE — 83735 ASSAY OF MAGNESIUM: CPT

## 2019-01-04 PROCEDURE — 36430 TRANSFUSION BLD/BLD COMPNT: CPT

## 2019-01-04 PROCEDURE — 85384 FIBRINOGEN ACTIVITY: CPT

## 2019-01-04 PROCEDURE — 80053 COMPREHEN METABOLIC PANEL: CPT

## 2019-01-04 PROCEDURE — 84100 ASSAY OF PHOSPHORUS: CPT

## 2019-01-04 PROCEDURE — 85027 COMPLETE CBC AUTOMATED: CPT

## 2019-01-04 PROCEDURE — 84132 ASSAY OF SERUM POTASSIUM: CPT

## 2019-01-04 PROCEDURE — 84295 ASSAY OF SERUM SODIUM: CPT

## 2019-01-04 RX ORDER — SODIUM CHLORIDE 9 MG/ML
250 INJECTION, SOLUTION INTRAVENOUS ONCE
Qty: 0 | Refills: 0 | Status: COMPLETED | OUTPATIENT
Start: 2019-01-04 | End: 2019-01-04

## 2019-01-04 RX ORDER — CEFEPIME 1 G/1
2000 INJECTION, POWDER, FOR SOLUTION INTRAMUSCULAR; INTRAVENOUS ONCE
Qty: 0 | Refills: 0 | Status: COMPLETED | OUTPATIENT
Start: 2019-01-04 | End: 2019-01-04

## 2019-01-04 RX ORDER — FUROSEMIDE 40 MG
80 TABLET ORAL ONCE
Qty: 0 | Refills: 0 | Status: DISCONTINUED | OUTPATIENT
Start: 2019-01-04 | End: 2019-01-04

## 2019-01-04 RX ORDER — MAGNESIUM SULFATE 500 MG/ML
2 VIAL (ML) INJECTION ONCE
Qty: 0 | Refills: 0 | Status: DISCONTINUED | OUTPATIENT
Start: 2019-01-04 | End: 2019-01-04

## 2019-01-04 RX ORDER — SODIUM CHLORIDE 9 MG/ML
1000 INJECTION INTRAMUSCULAR; INTRAVENOUS; SUBCUTANEOUS ONCE
Qty: 0 | Refills: 0 | Status: COMPLETED | OUTPATIENT
Start: 2019-01-04 | End: 2019-01-04

## 2019-01-04 RX ORDER — NOREPINEPHRINE BITARTRATE/D5W 8 MG/250ML
0.05 PLASTIC BAG, INJECTION (ML) INTRAVENOUS
Qty: 8 | Refills: 0 | Status: DISCONTINUED | OUTPATIENT
Start: 2019-01-04 | End: 2019-01-04

## 2019-01-04 RX ADMIN — SODIUM CHLORIDE 500 MILLILITER(S): 9 INJECTION, SOLUTION INTRAVENOUS at 08:00

## 2019-01-04 RX ADMIN — CEFEPIME 100 MILLIGRAM(S): 1 INJECTION, POWDER, FOR SOLUTION INTRAMUSCULAR; INTRAVENOUS at 07:11

## 2019-01-04 RX ADMIN — Medication 5.62 MICROGRAM(S)/KG/MIN: at 10:05

## 2019-01-04 RX ADMIN — SODIUM CHLORIDE 1000 MILLILITER(S): 9 INJECTION INTRAMUSCULAR; INTRAVENOUS; SUBCUTANEOUS at 07:26

## 2019-01-04 NOTE — DISCHARGE NOTE FOR THE EXPIRED PATIENT - HOSPITAL COURSE
Patient was transported from [facility] in cardiogenic shock on VA ECMO and upon arrival to the CTU before the patient was transferred to the bed he was found to be in full cardiac arrest with zero flows on the VA ECMO device.  CPR was immediately started.  It was noted on ultrasound that the heart was in compete ventricular stand still and ultrasound of the abdomin revealed increasing blood between the right diaphragm and the liver consistent with a catastrophic injury from the venous cannula.  The patient was subsequently pronounce dead.

## 2019-01-04 NOTE — BRIEF OPERATIVE NOTE - PROCEDURE
<<-----Click on this checkbox to enter Procedure Placement on or weaning from extracorporeal membrane oxygenation  01/04/2019    Cortney ABRAHAM  ECMO, percutaneous, adult  01/04/2019  CANNULATION BY FEMORAL ARTERIAL AND VENOUS ACCESS  Cortney ABRAHAM

## 2019-01-04 NOTE — ED PROVIDER NOTE - MEDICAL DECISION MAKING DETAILS
Pt s/p cardiac arrest w/ ROSC, suspect papillary mm rupture, admitted to CCU, taken to OR by CT surgery for ECMO.

## 2019-01-04 NOTE — PROGRESS NOTE ADULT - SUBJECTIVE AND OBJECTIVE BOX
CTS ATTENDING    Asked to evaluate this 83 year old male post PCI of Circumflex for NSTEMI on 12/21/2018.  Patient has had symptoms of dyspnea and was being evaluated for elective PALAK to grade mitral insufficiency, when he required ED Admission due to abnormal ECG at PMD (Dr. Joy).  In ED a STEMI code was called, then cancelled, and later the patient had diagnostic cath and then PCI of the Cx.    Today, patient felt anxious, short of breath and asked family to call for help.  A 911 call led to the patient being transported to the ED at [facility], where patient sustained two cardiac arrests, was successfully resuscitated and brought to the CTOR.  He required CPR again while being prepared for surgical intervention, and during this episode, arterial and venous cannulas were placed and the patient was placed on V-A ECMO.    Consultation with ECMO Center at [facility] led to the arrangement ot transfer the patient for ECMO support in anticipation of mitral valve surgery, if his condition improves sufficiently to eventually permit valve surgery.    The patient was transported by helicopter ambulance to [facility]. CTS ATTENDING    Asked to evaluate this 83 year old male post PCI of Circumflex for NSTEMI on 12/19/2018.  Patient has had symptoms of dyspnea and was being evaluated for elective PALAK to grade mitral insufficiency, when he required ED Admission due to abnormal ECG at [facility]D (Dr. Jyo).  In ED a STEMI code was called, then cancelled, and later, on 12/21/2018, the patient had diagnostic cath and then PCI of the Cx.  He was discharged 12/28/2018.    Today, patient felt anxious, short of breath and asked family to call for help.  A 911 call led to the patient being transported to the ED at [facility], where patient sustained two cardiac arrests, was successfully resuscitated and brought to the CTOR.  He required CPR again while being prepared for surgical intervention, and during this episode, arterial and venous cannulas were placed and the patient was placed on V-A ECMO.    Consultation with ECMO Center at [facility] led to the arrangement ot transfer the patient for ECMO support in anticipation of mitral valve surgery, if his condition improves sufficiently to eventually permit valve surgery.    The patient was transported by helicopter ambulance to [facility].

## 2019-01-04 NOTE — ED ADULT NURSE NOTE - NSIMPLEMENTINTERV_GEN_ALL_ED
Implemented All Fall with Harm Risk Interventions:  [place] to call system. Call bell, personal items and telephone within reach. Instruct patient to call for assistance. Room bathroom lighting operational. Non-slip footwear when patient is off stretcher. Physically safe environment: no spills, clutter or unnecessary equipment. Stretcher in lowest position, wheels locked, appropriate side rails in place. Provide visual cue, wrist band, yellow gown, etc. Monitor gait and stability. Monitor for mental status changes and reorient to person, place, and time. Review medications for side effects contributing to fall risk. Reinforce activity limits and safety measures with patient and family. Provide visual clues: red socks.

## 2019-01-04 NOTE — PROVIDER CONTACT NOTE (OTHER) - SITUATION
Pt arrived into CTU with Flight team on stretcher. As per transport monitor: HR 30's, MAP 39. Pt on VA ECMO, 0.5 LPM. 1mg Epinephrine IVP given, as per MD Crain.

## 2019-01-04 NOTE — ED ADULT NURSE REASSESSMENT NOTE - NS ED NURSE REASSESS COMMENT FT1
patient code called at 1045 cpr initiated, triple lumen to the right fem placed. Patient achieved RASC and transferred to CTU, See cod sheet scanned in charted.

## 2019-01-04 NOTE — CONSULT NOTE ADULT - ASSESSMENT
IMPRESSION:    Pulmonary edema - NSTEMI ?  Shock - septic vs cardiogenic  Pneumonia likely HCAP  History of CAD, recent stemi s/p PCI  Afib on AC          PLAN:    CNS: no depressants    HEENT: Oral care    PULMONARY:  HOB @ 45 degrees, Bipap as needed for work of breathing.     CARDIOVASCULAR: Start Levophed, keep Map more than 65mmhg. Hold beta blockers. Keep negative balance. Cardiology follow up.    GI: GI prophylaxis.  Feeding NPO for now    RENAL:  Follow up lytes.  Correct as needed. F/U BMP. Linares catheter, kidney ultrasound    INFECTIOUS DISEASE: Start Vancomycin, Meropenem, Azithromycin for HCAP, send pan-cultures, RVP panel    HEMATOLOGICAL:  DVT prophylaxis already on AC.    ENDOCRINE:  Follow up FS.  Insulin protocol if needed.    MUSCULOSKELETAL: bed in chair position    Admit to ICU IMPRESSION:    Pulmonary edema - NSTEMI vs complication of previous MI  Shock - Likely cardiogenic, doubt septic  Pneumonia?   History of CAD, recent stemi s/p PCI  Afib on AC          PLAN:    CNS: no depressants    HEENT: Oral care    PULMONARY:  HOB @ 45 degrees, currently intubated in the ED    CARDIOVASCULAR: Start Levophed, keep Map more than 65mmhg. Hold beta blockers. Cardiology and CTS follow up. Echocardiogram stat.    GI: GI prophylaxis.  NPO for now    RENAL:  Follow up lytes.  Correct as needed. F/U BMP. Linares catheter, kidney ultrasound    INFECTIOUS DISEASE: Start Vancomycin, Meropenem, Azithromycin for HCAP, send pan-cultures.    HEMATOLOGICAL:  DVT prophylaxis already on AC.    ENDOCRINE:  Follow up FS.  Insulin protocol if needed.    MUSCULOSKELETAL: bed in chair position    Prognosis guarded    Will be admitted to CTU

## 2019-01-04 NOTE — CONSULT NOTE ADULT - SUBJECTIVE AND OBJECTIVE BOX
Patient is a 83y old  Male who presents with a chief complaint of left sided chest pain.    HPI:    82 yo M with recent MI s/p stent two weeks ago by Dr. Navarro, on plavix and ASA, hx of glaucoma, EF 73%, presents for evaluation non radiating left sided chest pain, onset an hour ago, woken up from sleep, with no associated symptoms. NO fever, no chills, no n/v/d, no abdominal pain, no back pain, no arm pain, no neck pain. Patient states that the pain woke up him up from sleep. Now resolved. Patient denies any other symptoms.  In the ED found to have dot, pulmonary edema, and was hypotensive in the 70s systolic        PAST MEDICAL & SURGICAL HISTORY:  Glaucoma  History of appendectomy  Afib on AC      SOCIAL HX:   Smoking  neg                       ETOH  neg                           Other    FAMILY HISTORY:  No significant family history  :  No known cardiovacular family hisotry     ROS:  See HPI     Allergies    No Known Allergies    Intolerances          PHYSICAL EXAM    ICU Vital Signs Last 24 Hrs  T(C): 36.7 (04 Jan 2019 08:31), Max: 36.7 (04 Jan 2019 08:31)  T(F): 98 (04 Jan 2019 08:31), Max: 98 (04 Jan 2019 08:31)  HR: 75 (04 Jan 2019 10:06) (60 - 87)  BP: 120/58 (04 Jan 2019 10:06) (82/51 - 120/58)  BP(mean): --  ABP: --  ABP(mean): --  RR: 18 (04 Jan 2019 10:06) (18 - 18)  SpO2: 100% (04 Jan 2019 10:06) (95% - 100%)      General: In NAD   HEENT:  VIANEY              Lymphatic system: No cervical LN   Lungs: Bilateral BS, crackles bilaterally, on nasal canula  Cardiovascular: Regular  Gastrointestinal: Soft, Positive BS  Musculoskeletal: No clubbing.  Moves all extremities.  Full range of motion   Skin: Warm.  Intact  Neurological: No motor or sensory deficit         LABS:                          13.1   17.20 )-----------( 429      ( 04 Jan 2019 05:06 )             40.0                                               01-04    142  |  97<L>  |  102<HH>  ----------------------------<  129<H>  4.0   |  24  |  2.0<H>    Ca    9.2      04 Jan 2019 05:06    TPro  6.4  /  Alb  3.8  /  TBili  1.5<H>  /  DBili  x   /  AST  42<H>  /  ALT  66<H>  /  AlkPhos  79  01-04      PT/INR - ( 04 Jan 2019 05:06 )   PT: 26.80 sec;   INR: 2.35 ratio         PTT - ( 04 Jan 2019 05:06 )  PTT:41.0 sec                                           CARDIAC MARKERS ( 04 Jan 2019 05:06 )  x     / 1.19 ng/mL / x     / x     / x                                                LIVER FUNCTIONS - ( 04 Jan 2019 05:06 )  Alb: 3.8 g/dL / Pro: 6.4 g/dL / ALK PHOS: 79 U/L / ALT: 66 U/L / AST: 42 U/L / GGT: x                                                                                                                                       X-Rays Bilateral infiltartes, LLL opacity                                                                                    ECHO recent EF 73%    MEDICATIONS  (STANDING):  furosemide   Injectable 80 milliGRAM(s) IV Push once  norepinephrine Infusion 0.05 MICROgram(s)/kG/Min (5.625 mL/Hr) IV Continuous <Continuous>    MEDICATIONS  (PRN):
Date of Admission: 1/4/19    CHIEF COMPLAINT: shortness of breath, chest pain    HISTORY OF PRESENT ILLNESS: 83yMale with PMH below presented to the hospital for aboce CC, had left sided zhest pain, felt a "pop" in his chest and then had an episode of shortness of breath before coming to the hospital. He still complains of shortness of breath but no chest pain. He denies fever, chills. Prior to this was feeling weak at home the last few days.     PAST MEDICAL & SURGICAL HISTORY:  Glaucoma  History of appendectomy    HEALTH ISSUES - PROBLEM Dx:        FAMILY HISTORY:  No significant family history    None [x ]  Mother:   Father:   Siblings:     SOCIAL HISTORY:    [x ] Non-smoker  [ ] Smoker  [ ] Alcohol    Allergies    No Known Allergies    Intolerances    	    REVIEW OF SYSTEMS:  CONSTITUTIONAL: No fever, weight loss, or fatigue  CARDIOLOGY: see HPI  RESPIRATORY: see HPI  NEUROLOGICAL: NO weakness, no focal deficits to report.  ENDOCRINOLOGICAL: no recent change in diabetic medications.   GI: no BRBPR, no N,V,diarrhea.    PSYCHIATRY: normal mood and affect  HEENT: no nasal discharge, no ecchymosis  SKIN: no ecchymosis, no breakdown  MUSCULOSKELETAL: Full range of motion x4.      PHYSICAL EXAM:  T(C): 36.7 (01-04-19 @ 08:31), Max: 36.7 (01-04-19 @ 08:31)  HR: 75 (01-04-19 @ 10:06) (60 - 87)  BP: 120/58 (01-04-19 @ 10:06) (82/51 - 120/58)  RR: 18 (01-04-19 @ 10:06) (18 - 18)  SpO2: 100% (01-04-19 @ 10:06) (95% - 100%)  Wt(kg): --  I&O's Summary    Daily     Daily     General Appearance: elderly gentleman	  Cardiovascular: Normal S1 S2, No JVD, +systiolic harsh murmur best heard at apex, No edema  Respiratory: crackles midway up the lung fields B/L  Psychiatry: A & O x 3, Mood & affect appropriate  Gastrointestinal:  Soft, Non-tender  Skin: No rashes, No ecchymoses, No cyanosis	  Neurologic: Non-focal  Musculoskeletal/ extremities: Normal range of motion, No clubbing, cyanosis or edema  Vascular: Peripheral pulses palpable 2+ bilaterally    LABS:	 	                          13.1   17.20 )-----------( 429      ( 04 Jan 2019 05:06 )             40.0     01-04    142  |  97<L>  |  102<HH>  ----------------------------<  129<H>  4.0   |  24  |  2.0<H>    Ca    9.2      04 Jan 2019 05:06    TPro  6.4  /  Alb  3.8  /  TBili  1.5<H>  /  DBili  x   /  AST  42<H>  /  ALT  66<H>  /  AlkPhos  79  01-04    CARDIAC MARKERS ( 04 Jan 2019 05:06 )  x     / 1.19 ng/mL / x     / x     / x          PT/INR - ( 04 Jan 2019 05:06 )   PT: 26.80 sec;   INR: 2.35 ratio         PTT - ( 04 Jan 2019 05:06 )  PTT:41.0 sec    CARDIAC MARKERS:            TELEMETRY EVENTS: 	    ECG: < from: 12 Lead ECG (01.04.19 @ 09:35) >  Diagnosis Line Normal sinus rhythm  Left axis deviation  Left bundle branch block  Abnormal ECG    < end of copied text >   	  RADIOLOGY: < from: Xray Chest 1 View- PORTABLE-Routine (12.28.18 @ 06:32) >  Persistent pulmonary vascular congestion and improved bibasilar   opacities/pleural effusions.    < end of copied text >    OTHER: 	    PREVIOUS DIAGNOSTIC TESTING:    [x ] Echocardiogram: bedside done by myself. normal EF, dyssynergistic motion of LV walls but none akinetic. There is severe mitral regurgitation.   [ ]  Catheterization:  [ ] Stress Test:  	  	    Home Medications:  timolol hemihydrate 0.5% ophthalmic solution: 1 drop(s) to each affected eye 2 times a day (19 Dec 2018 19:04)    MEDICATIONS  (STANDING):  furosemide   Injectable 80 milliGRAM(s) IV Push once  magnesium sulfate  IVPB 2 Gram(s) IV Intermittent Once  norepinephrine Infusion 0.05 MICROgram(s)/kG/Min (5.625 mL/Hr) IV Continuous <Continuous>    MEDICATIONS  (PRN):

## 2019-01-04 NOTE — ED PROVIDER NOTE - ATTENDING CONTRIBUTION TO CARE
84 yo M with recent MI s/p stent two weeks ago by Dr. Navarro, on plavix and ASA, hx of glaucoma, EF 73 who presents with left sided sharp chest pain since 3 am, that is now resolved with radiation to neck. he denies cough, or sob, denies leg swelling, denies fever. on exam he has rhonchi in both lung fields, no pitting edema, nml s1/s2, abd soft. He is found to be hypotensive, we evaluated for causes of obstruction shock on us which showed no tamponade or cardiac effusion. ekg without stemi. plan is to check labs, cxr and consult cardiology. CXR shows heart failure vs pneumonia but given wbc likely pneumonia, labs are also consistent with dehyration so will give small fluid boluses, will consult ICU. Recent stent placed. today presents with chest pain at 3 am that is left sided and sharp that resolved prior to arrival to ed. He denies fevers, sob, leg swelling however endorses cough. On my exam he is hypotensive, rhonchi in both lung fields wihtout pitting edema. EKG does not show any signs of acute mi. On bedside us there was no signs of pericardial effusion or cardiac tamponade as the possible cause of hypotension. Given his xray, wbc, and cough I do suspect PNA leading to sepsis but differential also includes cardiogenic shock. For this reason small doses of IVF bolus will be given to closely monitor repsitoray status and bp and will then consider pressors if not improving. will need icu consult and will also discuss with cardiology

## 2019-01-04 NOTE — ED PROVIDER NOTE - PROGRESS NOTE DETAILS
Paged cardio fellow Paged Dr. Mead Spoke to Dr. Dukes, who would like patient to remain NPO. To be called back with labs and CXR. Will be at the hospital within the hour. ICU fellow paged, pending evaluation for decision Patient signed out to Dr. Gustafson for continued care Received sign out from Dr. Miranda - patient is 81yo with recent MI s/p stent with Dr. Aldana presents for left sided CP since this AM. (+) perri B/L, RRR, abd NDNT soft. AAO x3. Patient awaiting ICU approval. repeat bp 75/50. Pt started on levo. Still pending eval from ICU. Pt noted to have increasing hypotensive, periph low dose levo started. Pt however feels much better, color looks better, resting comfortably. I was called to pt bedside, pt in cardiac arrest w/ ongoing CPR, which started almost immediately after unresponsiveness. pt had variously narrow complex, wide complex, PEA, and asystole.  Cardiology fellow present, suspect papillary MM rupture 2/2 wide open MVR on echo. Pt given epi, mag, ca, bicarb, dobutamine, norepi, IVF, ROSC achieved. bedside echo no effusion, + b/l bline. + organized cardiac activity. Pt intubated by ED w/ first pass. ICU attending and CTsurg attending at bedside.  Central line placed by cardiology. A line placed by CT surgery. Pt taken to OR for ECMO. Code, course and plan discussed w/ family.

## 2019-01-04 NOTE — BRIEF OPERATIVE NOTE - POST-OP DX
Cardiac arrest due to underlying cardiac condition  01/04/2019    Active  Chang Oneil  Ruptured papillary muscle complicating acute MI  01/04/2019    Active  Chang Oneil

## 2019-01-04 NOTE — BRIEF OPERATIVE NOTE - OPERATION/FINDINGS
1. IMPROVEMENT IN METABOLIC PROFILE AFTER INITIATION OF ECMO LIFE SUPPORT  2. SEVERE MITRAL REGURGITATION WITH FLAIL RUPTURED PAPILLARY MUSCLE  3. SEVERE LACTIC ACIDEMIA (EXCEEDING 13!)  4. CASE REFERRED TO [facility] FOR RE-EVALUATION AND EVENTUAL MVR

## 2019-01-04 NOTE — ED PROVIDER NOTE - OBJECTIVE STATEMENT
82 yo M with recent MI s/p stent two weeks ago by Dr. Navarro, hx of glaucoma, EF 73%, presents for evaluation non radiating left sided chest pain, onset an hour ago, woken up from sleep, with no associated symptoms. NO fever, no chills, no n/v/d, no abdominal pain, no back pain, no arm pain, no neck pain. Patient states that the pain woke up him up from sleep. Now resolved. Patient denies any other symptoms. 82 yo M with recent MI s/p stent two weeks ago by Dr. Navarro, on plavix and ASA, hx of glaucoma, EF 73%, presents for evaluation non radiating left sided chest pain, onset an hour ago, woken up from sleep, with no associated symptoms. NO fever, no chills, no n/v/d, no abdominal pain, no back pain, no arm pain, no neck pain. Patient states that the pain woke up him up from sleep. Now resolved. Patient denies any other symptoms.

## 2019-01-04 NOTE — CONSULT NOTE ADULT - ASSESSMENT
Acute Severe MR  Cardiogenic shock  recent NSTEMI  - will need evaluation for emergent mitral valve replacement/ repair  - CT surgery consult STAT for ecmo and mitral valve surgery  - start levophed/ dobutamine in the interim  - intubation likely

## 2019-01-04 NOTE — ED PROVIDER NOTE - CRITICAL CARE PROVIDED
interpretation of diagnostic studies/consult w/ pt's family directly relating to pts condition/direct patient care (not related to procedure)/additional history taking/consultation with other physicians/conducted a detailed discussion of DNR status

## 2019-02-06 ENCOUNTER — APPOINTMENT (OUTPATIENT)
Dept: CARDIOLOGY | Facility: CLINIC | Age: 84
End: 2019-02-06

## 2019-02-12 ENCOUNTER — APPOINTMENT (OUTPATIENT)
Dept: UROLOGY | Facility: CLINIC | Age: 84
End: 2019-02-12

## 2020-02-21 NOTE — PROGRESS NOTE ADULT - SUBJECTIVE AND OBJECTIVE BOX
Respiratory questionnaire and clearance reviewed and signed. See scanned documents.     Patient is a 83y old  Male who presents with a chief complaint of CODE STEMI (25 Dec 2018 08:29)      Over Night Events:  Patient seen and examined feel better on RA       ROS:  See HPI    PHYSICAL EXAM    ICU Vital Signs Last 24 Hrs  T(C): 36.2 (25 Dec 2018 04:00), Max: 36.4 (24 Dec 2018 16:00)  T(F): 97.2 (25 Dec 2018 04:00), Max: 97.5 (24 Dec 2018 16:00)  HR: 70 (25 Dec 2018 06:00) (70 - 134)  BP: 97/50 (25 Dec 2018 06:00) (92/54 - 120/64)  BP(mean): 61 (25 Dec 2018 06:00) (59 - 82)  ABP: --  ABP(mean): --  RR: 19 (25 Dec 2018 06:00) (19 - 25)  SpO2: 93% (25 Dec 2018 06:00) (91% - 99%)      General: Aox3  HEENT:  [place]            Lymph Nodes: NO cervical LN   Lungs: Bilateral crackles   Cardiovascular: Regular   Abdomen: Soft, Positive BS  Extremities: No clubbing   Skin: warm   Neurological: no focal deficit   Musculoskeletal: move all ext     I&O's Detail    24 Dec 2018 07:01  -  25 Dec 2018 07:00  --------------------------------------------------------  IN:    heparin Infusion: 230 mL    Oral Fluid: 940 mL  Total IN: 1170 mL    OUT:    Indwelling Catheter - Urethral: 2105 mL  Total OUT: 2105 mL    Total NET: -935 mL          LABS:                          11.9   11.54 )-----------( 271      ( 25 Dec 2018 05:00 )             35.6         25 Dec 2018 05:00    136    |  96     |  85     ----------------------------<  96     4.2     |  22     |  2.9      Ca    8.6        25 Dec 2018 05:00  Phos  4.9       25 Dec 2018 05:00  Mg     2.5       25 Dec 2018 05:00    TPro  5.9    /  Alb  3.0    /  TBili  0.6    /  DBili  x      /  AST  68     /  ALT  208    /  AlkPhos  90     25 Dec 2018 05:00  Amylase x     lipase x                                                 PT/INR - ( 25 Dec 2018 00:39 )   PT: 16.70 sec;   INR: 1.46 ratio         PTT - ( 25 Dec 2018 05:00 )  PTT:68.7 sec                                       Urinalysis Basic - ( 23 Dec 2018 22:20 )    Color: Red / Appearance: Turbid / S.025 / pH: x  Gluc: x / Ketone: Negative  / Bili: Small / Urobili: 1.0 mg/dL   Blood: x / Protein: 30 mg/dL / Nitrite: Negative   Leuk Esterase: Moderate / RBC: >50 /HPF / WBC 26-50 /HPF   Sq Epi: x / Non Sq Epi: Few /HPF / Bacteria: Many /HPF                                                                                                                                                     MEDICATIONS  (STANDING):  aspirin  chewable 81 milliGRAM(s) Oral daily  atorvastatin 40 milliGRAM(s) Oral at bedtime  chlorhexidine 4% Liquid 1 Application(s) Topical <User Schedule>  clopidogrel Tablet 75 milliGRAM(s) Oral daily  furosemide   Injectable 40 milliGRAM(s) IV Push once  heparin  Infusion 1000 Unit(s)/Hr (10 mL/Hr) IV Continuous <Continuous>  metoprolol tartrate 50 milliGRAM(s) Oral two times a day  sodium bicarbonate 325 milliGRAM(s) Oral three times a day  timolol 0.5% Solution 1 Drop(s) Both EYES daily    MEDICATIONS  (PRN):          Xrays:  TLC:  OG:  ET tube:                                                                                    congetsion b/l ?/ worsening    ECHO:

## 2022-05-19 NOTE — ED ADULT TRIAGE NOTE - NS ED NURSE DIRECT TO ROOM YN
Chief Complaint   Patient presents with    Medication Refill     Last Appointment with Dr. Morris Lehman:  3/29/2022  Future Appointments   Date Time Provider Loco Castro   8/15/2022  9:30 AM MD RUBIN Cameron BS AMB   11/18/2022 11:20 AM Africa Lopez DO NEUSM BS AMB
Yes

## 2023-06-04 NOTE — H&P ADULT - NSHPLABSRESULTS_GEN_ALL_CORE
CARDIAC MARKERS ( 19 Dec 2018 19:42 )  x     / 0.75 ng/mL / x     / x     / x                            13.9   13.35 )-----------( 239      ( 19 Dec 2018 19:42 )             43.1     12-19  138  |  99  |  42<H>  ----------------------------<  120<H>  5.1<H>   |  26  |  1.2  Ca    10.3<H>      19 Dec 2018 19:42    TPro  7.8  /  Alb  4.2  /  TBili  1.7<H>  /  DBili  x   /  AST  59<H>  /  ALT  57<H>  /  AlkPhos  110  12-19    LIVER FUNCTIONS - ( 19 Dec 2018 19:42 )  Alb: 4.2 g/dL / Pro: 7.8 g/dL / ALK PHOS: 110 U/L / ALT: 57 U/L / AST: 59 U/L / GGT: x
Calm

## 2023-09-11 NOTE — PROVIDER CONTACT NOTE (OTHER) - ACTION/TREATMENT ORDERED:
upon discussion with above three physicans Dr. Benitez ordered 40mg IV lasix and irrigate monaco PRN . Xolair Pregnancy And Lactation Text: This medication is Pregnancy Category B and is considered safe during pregnancy. This medication is excreted in breast milk.

## 2025-01-15 NOTE — ED ADULT NURSE NOTE - NSFALLRSKOUTCOME_ED_ALL_ED
1/11/25 Left parotid culture of pus MRSA sensitive to tetracycline, 1 col candida likely colonizer  1/12/2025 CT soft tissue neck: Asymmetric enlargement-uremia of the left parotid gland with surrounding inflammatory changes.  Previously identified 2 mm sialolith in the left parotid duct has moved distally.  There was 4 x 8 mm hypodensity within the left parotid gland suspicious for collection.  -discontinue IV Vancomycin   -transition to PO Doxycycline 100 mg q 12 hours to complete total 3 weeks of antibiotic therapy through 1/31/25  -monitor temperature and hemodynamics  -serial exam  -ENT following prn  -monitoring serial CBC and BMP for treatment response and any developing toxicities  -patient will follow up with me 2/4/25 11:30 am in [facility] office   Universal Safety Interventions